# Patient Record
Sex: FEMALE | Race: WHITE | NOT HISPANIC OR LATINO | Employment: FULL TIME | URBAN - METROPOLITAN AREA
[De-identification: names, ages, dates, MRNs, and addresses within clinical notes are randomized per-mention and may not be internally consistent; named-entity substitution may affect disease eponyms.]

---

## 2017-04-25 DIAGNOSIS — H90.5 SNHL (SENSORINEURAL HEARING LOSS): Primary | ICD-10-CM

## 2017-05-04 ENCOUNTER — OFFICE VISIT (OUTPATIENT)
Dept: OPHTHALMOLOGY | Facility: CLINIC | Age: 52
End: 2017-05-04

## 2017-05-04 DIAGNOSIS — H44.23 DEGENERATIVE PROGRESSIVE HIGH MYOPIA, BILATERAL: Primary | ICD-10-CM

## 2017-05-04 DIAGNOSIS — H52.4 PRESBYOPIA: ICD-10-CM

## 2017-05-04 DIAGNOSIS — Z86.69 H/O RETINAL DETACHMENT: ICD-10-CM

## 2017-05-04 ASSESSMENT — REFRACTION_WEARINGRX
OS_CYLINDER: +0.50
OS_SPHERE: -10.00
OD_AXIS: 117
OD_CYLINDER: +1.00
OS_AXIS: 043
OD_SPHERE: -10.50

## 2017-05-04 ASSESSMENT — VISUAL ACUITY
OD_PH_CC: 20/25-3
OD_CC+: -2+1
OS_PH_CC: 20/20
OS_CC: 20/30
CORRECTION_TYPE: CONTACTS
OD_CC: 20/30
METHOD: SNELLEN - LINEAR

## 2017-05-04 ASSESSMENT — REFRACTION_MANIFEST
OD_ADD: +2.00
OS_ADD: +2.00
OS_CYLINDER: SPHERE
OD_CYLINDER: +1.25
OD_SPHERE: -12.50
OS_SPHERE: -11.00
OD_AXIS: 105

## 2017-05-04 ASSESSMENT — REFRACTION_CURRENTRX
OD_SPHERE: -10.50
OS_BASECURVE: 8.6
OS_SPHERE: -10.00
OD_DIAMETER: 14.0
OD_BASECURVE: 8.6
OS_DIAMETER: 14.0

## 2017-05-04 ASSESSMENT — KERATOMETRY
OS_K2POWER_DIOPTERS: 43.25
OS_AXISANGLE_DEGREES: 100
OD_AXISANGLE2_DEGREES: 174
OD_K1POWER_DIOPTERS: 43.00
OD_AXISANGLE_DEGREES: 84
OS_AXISANGLE2_DEGREES: 10
OS_K1POWER_DIOPTERS: 42.75
OD_K2POWER_DIOPTERS: 44.50

## 2017-05-04 ASSESSMENT — TONOMETRY
OD_IOP_MMHG: 18
OS_IOP_MMHG: 18
IOP_METHOD: APPLANATION

## 2017-05-04 ASSESSMENT — CONF VISUAL FIELD
OS_NORMAL: 1
OD_NORMAL: 1
METHOD: COUNTING FINGERS

## 2017-05-04 ASSESSMENT — EXTERNAL EXAM - LEFT EYE: OS_EXAM: NORMAL

## 2017-05-04 ASSESSMENT — EXTERNAL EXAM - RIGHT EYE: OD_EXAM: NORMAL

## 2017-05-04 ASSESSMENT — CUP TO DISC RATIO
OD_RATIO: 0.30
OS_RATIO: 0.25

## 2017-05-04 ASSESSMENT — SLIT LAMP EXAM - LIDS
COMMENTS: NORMAL
COMMENTS: NORMAL

## 2017-05-04 NOTE — MR AVS SNAPSHOT
After Visit Summary   5/4/2017    Anna Carrington    MRN: [id number]           Patient Information     Date Of Birth          1965        Visit Information        Provider Department      5/4/2017 1:00 PM Andreia Conklin OD [facility]        Today's Diagnoses     Degenerative progressive high myopia, bilateral    -  1    H/O retinal detachment        PSC (posterior subcapsular cataract), bilateral        Presbyopia           Follow-ups after your visit        Your next 10 appointments already scheduled     Jun 01, 2017  3:00 PM CDT   Walk In From ENT with Angi Hollingsworth   [facility] ([facility])    [address] [place] [id number]   [phone]            Jun 01, 2017  4:00 PM CDT   (Arrive by 3:45 PM)   New Patient Visit with Tuan Sanches MD   [facility] Nose [facility] ([facility])    [address] [place] [address]   [phone]            Jun 19, 2017  9:40 AM CDT   (Arrive by 9:10 AM)   NEW THROAT with Alisa Garsia MD   [facility] ([facility])    [address] [place] [address]   [phone]           - This is a multidisciplinary care team visit with an ENT physician and may include a speech language pathologist. - It is important to know that if you are evaluated and/or treated by both a physician and a speech pathologist during your visit, your billing will reflect the input that you receive from both providers as separate professionals. Although most insurance plans do cover these services, we encourage you to contact your insurance company prior to your visit to determine whether there are any coverage limitations that might affect you financially. - Billing/procedure codes that are frequently associated with visits to our clinic include (but  are not limited to) the ones listed below. Most patients will not need all of these items, but it may be useful to ask your insurance company's patient . 26524: Flexible fiberoptic laryngoscopy, 81611: Laryngoscopy; flexible or rigid fiberoptic, with stroboscopy, 10494: Flexible endoscopic evaluation of swallowing, 31570: Evaluation of Voice and Resonance, 04762: Speech pathology treatment for voice, speech, communication, 06101: Speech pathology treatment for swallowing/oral function for feeding - If you have any concerns or questions, or if you would prefer not to have a speech pathologist involved in your visit, please contact our Clinic Coordinator at [phone], at least 24 hours prior to your appointment.              Who to contact     Please call your clinic at [phone] to:    Ask questions about your health    Make or cancel appointments    Discuss your medicines    Learn about your test results    Speak to your doctor   If you have compliments or concerns about an experience at your clinic, or if you wish to file a complaint, please contact [facility] Patient Relations at [phone] or email us at [email or web address]         Additional Information About Your Visit        [employer] Information     [employer] gives you secure access to your electronic health record. If you see a primary care provider, you can also send messages to your care team and make appointments. If you have questions, please call your primary care clinic.  If you do not have a primary care provider, please call [phone] and they will assist you.      [employer] is an electronic gateway that provides easy, online access to your medical records. With [employer], you can request a clinic appointment, read your test results, renew a prescription or communicate with your care team.     To access your existing account, please contact your [facility]  Clinic or call [phone] for assistance.        Care EveryWhere ID     This is your Care EveryWhere ID. This could be used by other organizations to access your [facility] medical records  [id number]         Blood Pressure from Last 3 Encounters:   11/21/12 123/84   08/15/12 122/72    Weight from Last 3 Encounters:   11/21/12 70.3 kg (155 lb)   08/15/12 68.5 kg (151 lb)              We Performed the Following     HC CONTACT LENS FITTING COSMETIC LVL 1 (99723.011)     REFRACTION [02806]        Primary Care Provider Office Phone # Fax #    Jeanette Luna [phone]       [address] [address]        Thank you!     Thank you for choosing [facility] OPHTHALMOLOGY  for your care. Our goal is always to provide you with excellent care. Hearing back from our patients is one way we can continue to improve our services. Please take a few minutes to complete the written survey that you may receive in the mail after your visit with us. Thank you!             Your Updated Medication List - Protect others around you: Learn how to safely use, store and throw away your medicines at www.disposemymeds.org.          This list is accurate as of: 5/4/17  3:58 PM.  Always use your most recent med list.                   Brand Name Dispense Instructions for use    CALCIUM 500+D PO      Take 1 tablet by mouth 2 times daily.       CLARITIN 10 MG capsule   Generic drug:  loratadine      Take 10 mg by mouth daily       DAILY VITAMIN PO      Take 1 tablet by mouth daily.       fluticasone 50 MCG/ACT spray    FLONASE     Spray 2 sprays into both nostrils daily       OMEGA-3 FISH OIL PO

## 2017-05-04 NOTE — NURSING NOTE
Chief Complaints and History of Present Illnesses   Patient presents with     COMPREHENSIVE EYE EXAM     h/o RD repair RE 07/04/2008; Paving stone degeneration inferiorly LE     HPI    Affected eye(s):  Both   Symptoms:     No blurred vision   No difficulty with reading   Floaters (Comment: longstanding floaters that have remained unchanged per pt, denies curtain or veil over VA)   Flashes   No tearing   Dryness (Comment: uses Refresh AT prn for relief)         Do you have eye pain now?:  No      Comments:    Patient notes she feels she needs updated gls rx today.     Dawn Vergara May 4, 2017 1:10 PM

## 2017-05-04 NOTE — PROGRESS NOTES
A/P  1.) s/p PPV and scleral buckle repair OD, Pavingstone degeneration OU  -Retinal exam stable  -Reviewed signs/symptoms of RD and RTC stat should symptoms occur    2.) Cataracts OU  -Not visually significant, continue to monitor    3.) Degenerative Myopia OU/Presbyopia  -Regular RGP wear, current lenses several years old (last fit at [facility]?)  -Reviewed options with pt - she would like to continue in DVO RGP's with readers over  -Order pair, mail to pt. RTC 2-3 weeks for CL recheck  -Spec Rx updated    Contact Lens Billing  V-Code:  - GP Spherical  Final Contact Lens Rx      Brand Base Curve Diameter Sphere Lens   Right Wolf RGP 7.89 9.5 -9.50 Optimum Extra green dot   Left Wolf RGP 7.89 9.5 -9.50 Optimum Extra blue            # of units: 2  Price per Unit: $80    Encounter Diagnoses   Name Primary?     Degenerative progressive high myopia, bilateral Yes     H/O retinal detachment      PSC (posterior subcapsular cataract), bilateral      Presbyopia       I have confirmed the patient's CC, HPI and reviewed Past Medical History, Past Surgical History, Social History, Family History, Problem List, Medication List and agree with Tech note.     Andreia Conklin, OD FAAO

## 2017-05-09 ENCOUNTER — MEDICAL CORRESPONDENCE (OUTPATIENT)
Dept: HEALTH INFORMATION MANAGEMENT | Facility: CLINIC | Age: 52
End: 2017-05-09

## 2017-05-09 ENCOUNTER — TRANSFERRED RECORDS (OUTPATIENT)
Dept: HEALTH INFORMATION MANAGEMENT | Facility: CLINIC | Age: 52
End: 2017-05-09

## 2017-05-17 ENCOUNTER — MEDICAL CORRESPONDENCE (OUTPATIENT)
Dept: HEALTH INFORMATION MANAGEMENT | Facility: CLINIC | Age: 52
End: 2017-05-17

## 2017-05-17 ENCOUNTER — TELEPHONE (OUTPATIENT)
Dept: GASTROENTEROLOGY | Facility: OUTPATIENT CENTER | Age: 52
End: 2017-05-17

## 2017-05-17 DIAGNOSIS — Z12.11 ENCOUNTER FOR SCREENING COLONOSCOPY: Primary | ICD-10-CM

## 2017-05-17 NOTE — TELEPHONE ENCOUNTER
Patient taking any blood thinners ? no    Heart disease ? denies    Lung disease ? denies      Sleep apnea ? denies    Diabetic ? denies    Kidney disease ? denies    Dialysis ? n/a    Electronic implanted medical devices ? denies    Are you taking any narcotic pain medication ?   noWhat is your daily dosage ?    PTSD ? n/a    Prep instructions reviewed with patient ? Instructions,  policy, MAC sedation plan reviewed. Advised pt. To have someone stay with her post exam    Pharmacy : [facility]    Indication for procedure : Screening colonoscopy    Referring provider : Self

## 2017-05-22 ENCOUNTER — PRE VISIT (OUTPATIENT)
Dept: OTOLARYNGOLOGY | Facility: CLINIC | Age: 52
End: 2017-05-22

## 2017-05-22 NOTE — TELEPHONE ENCOUNTER
1.  Date/reason for appt:  6/01/17   Hearing Loss    2.  Referring provider:  Self    3.  Call to patient (Yes / No - short description):  No, established pt.    Records reviewed.  All records are in Epic

## 2017-05-24 ENCOUNTER — DOCUMENTATION ONLY (OUTPATIENT)
Dept: GASTROENTEROLOGY | Facility: OUTPATIENT CENTER | Age: 52
End: 2017-05-24

## 2017-05-25 ENCOUNTER — TRANSFERRED RECORDS (OUTPATIENT)
Dept: HEALTH INFORMATION MANAGEMENT | Facility: CLINIC | Age: 52
End: 2017-05-25

## 2017-05-25 ENCOUNTER — DOCUMENTATION ONLY (OUTPATIENT)
Dept: GASTROENTEROLOGY | Facility: OUTPATIENT CENTER | Age: 52
End: 2017-05-25

## 2017-05-31 DIAGNOSIS — H90.5 RIGHT-SIDED SENSORINEURAL HEARING LOSS: Primary | ICD-10-CM

## 2017-06-01 ENCOUNTER — OFFICE VISIT (OUTPATIENT)
Dept: AUDIOLOGY | Facility: CLINIC | Age: 52
End: 2017-06-01
Attending: OTOLARYNGOLOGY

## 2017-06-01 ENCOUNTER — OFFICE VISIT (OUTPATIENT)
Dept: OTOLARYNGOLOGY | Facility: CLINIC | Age: 52
End: 2017-06-01

## 2017-06-01 VITALS — BODY MASS INDEX: 24.18 KG/M2 | WEIGHT: 159 LBS

## 2017-06-01 DIAGNOSIS — H90.3 SNHL (SENSORY-NEURAL HEARING LOSS), ASYMMETRICAL: Primary | ICD-10-CM

## 2017-06-01 DIAGNOSIS — H90.5 SENSORINEURAL HEARING LOSS OF LEFT EAR: ICD-10-CM

## 2017-06-01 DIAGNOSIS — H90.2 CONDUCTIVE HEARING LOSS, UNILATERAL: ICD-10-CM

## 2017-06-01 DIAGNOSIS — H90.71 MIXED HEARING LOSS OF RIGHT EAR: ICD-10-CM

## 2017-06-01 ASSESSMENT — PAIN SCALES - GENERAL: PAINLEVEL: NO PAIN (0)

## 2017-06-01 NOTE — LETTER
6/1/2017       RE: Anna Carrington  [address] [address]     Dear Colleague,    Thank you for referring your patient, Anna Carrington, to the [facility] EAR NOSE AND THROAT at [facility]. Please see a copy of my visit note below.    The patient presents with a history of hearing loss worse in the right ear than in the left ear.  The patient reports a progressive hearing loss in both ears over at least the past five years.  The patient denies ear infections, otalgia, otorrhea, dizziness, or tinnitus.  She reports that she is also having difficulty with her voice and she is scheduled to visit with Dr. Alisa Garsia in two weeks. The patient denies sinusitis, rhinitis, facial pain, nasal obstruction or purulent nasal discharge. The patient denies chronic or recurrent tonsillitis, chronic or recurrent pharyngitis. The patient denies otalgia, otorrhea, eustachian tube dysfunction, ear infections, dizziness or tinnitus. Her Audiogram and Tympanogram are reviewed with her and they demonstrate bilateral sensorineural hearing loss that is much worse in the right ear than in the left ear. The right ear has an added conductive hearing loss. She has word recognition scores of 100% on the right and 96% on the left. Her tympanograms are normal. Her Audiogram and Tympanogram from 2012 is reviewed with her and this demonstrates a significant hearing loss in the right ear over this time period.     This patient is seen in consultation as a self referral to my clinic.     All other systems were reviewed and they are either negative or they are not directly pertinent to this Otolaryngology examination.      Past Medical History:    Past Medical History:   Diagnosis Date     CNS vasculitis (H)      Dysphonia 2016    Difficulty with my voice (persistent throat clearing, cough,     Hearing problem      Hoarseness 2016    difficulty singing, tiredness has been going on for  a year+.     Numbness     heel of foot     Tinnitus 5 years ago?     Weakness        Past Surgical History:    Past Surgical History:   Procedure Laterality Date     BIOPSY OF SKIN LESION  2011     retinal detachment surgery  July 4, 2008     RETINAL REATTACHMENT Right 7-4-2008     THORACOSCOPIC BIOPSY LUNG  2001       Medications:      Current Outpatient Prescriptions:      fluticasone (FLONASE) 50 MCG/ACT nasal spray, Spray 2 sprays into both nostrils daily, Disp: , Rfl:      Omega-3 Fatty Acids (OMEGA-3 FISH OIL PO), , Disp: , Rfl:      Multiple Vitamin (DAILY VITAMIN PO), Take 1 tablet by mouth daily., Disp: , Rfl:      Calcium Carbonate-Vitamin D (CALCIUM 500+D PO), Take 1 tablet by mouth 2 times daily., Disp: , Rfl:      loratadine (CLARITIN) 10 MG capsule, Take 10 mg by mouth daily, Disp: , Rfl:     Allergies:    Hay fever & [a.r.m.]; Minocycline; and Tetracycline    Physical Examination:    The patient is a well developed, well nourished female in no apparent distress.  She is normocephalic, atraumatic with pupils equally round and reactive to light.    Oral Cavity Examination:  Normal mucosa with no masses or lesions  Nasal Examination: Normal mucosa with no masses or lesions  Ear Examination: Ear canals clear, tympanic membranes and middle ear spaces normal  Neurological Examination: Facial nerve function intact and symmetric  Integumentary Examination: No lesions on the skin of the head and neck  Neck Examination: No masses or lesions, no lymphadenopathy  Endocrine Examination: Normal thyroid examination    Assessment and Plan:    The patient presents with a history of bilateral sensorineural hearing loss that is much worse in the right ear than in the left ear. The patient also has a conductive hearing loss in the right ear. She will be referred for an MRI scan of the head to assess for retrocochlear pathology and intracranial pathology and she will be referred to Dr. Rick Nissen for further evaluation  and management.       Again, thank you for allowing me to participate in the care of your patient.      Sincerely,    Tuan Sanches MD

## 2017-06-01 NOTE — MR AVS SNAPSHOT
After Visit Summary   6/1/2017    Anna Carrington    MRN: [id number]           Patient Information     Date Of Birth          1965        Visit Information        Provider Department      6/1/2017 4:00 PM Tuan Sanches MD M [facility] Ear Nose and Throat        Today's Diagnoses     SNHL (sensory-neural hearing loss), asymmetrical    -  1    Conductive hearing loss, unilateral          Care Instructions    The patient presents with a history of bilateral sensorineural hearing loss that is much worse in the right ear than in the left ear. The patient also has a conductive hearing loss in the right ear. She will be referred for an MRI scan of the head to assess for retrocochlear pathology and intracranial pathology and she will be referred to Dr. Rick Nissen for further evaluation and management.          Follow-ups after your visit        Your next 10 appointments already scheduled     Jun 19, 2017  9:40 AM CDT   (Arrive by 9:10 AM)   NEW THROAT with Alisa Garsia MD   [facility] Ear Nose and Throat ([facility])    [address] [place] [address] [phone]           - This is a multidisciplinary care team visit with an ENT physician and may include a speech language pathologist. - It is important to know that if you are evaluated and/or treated by both a physician and a speech pathologist during your visit, your billing will reflect the input that you receive from both providers as separate professionals. Although most insurance plans do cover these services, we encourage you to contact your insurance company prior to your visit to determine whether there are any coverage limitations that might affect you financially. - Billing/procedure codes that are frequently associated with visits to our clinic include (but are not limited to) the ones listed below. Most patients will not need all of these items, but it may be useful  to ask your insurance company's patient . 97279: Flexible fiberoptic laryngoscopy, 70662: Laryngoscopy; flexible or rigid fiberoptic, with stroboscopy, 96873: Flexible endoscopic evaluation of swallowing, 86339: Evaluation of Voice and Resonance, 99816: Speech pathology treatment for voice, speech, communication, 59238: Speech pathology treatment for swallowing/oral function for feeding - If you have any concerns or questions, or if you would prefer not to have a speech pathologist involved in your visit, please contact our Clinic Coordinator at [phone], at least 24 hours prior to your appointment.            Jul 18, 2017  7:45 AM CDT   (Arrive by 7:30 AM)   MR BRAIN W/O & W CONTRAST with [address] MRI ([facility])    [address] [place] [address] [phone]           Take your medicines as usual, unless your doctor tells you not to. Bring a list of your current medicines to your exam (including vitamins, minerals and over-the-counter drugs).  You will be given intravenous contrast for this exam. To prepare:   The day before your exam, drink extra fluids at least six 8-ounce glasses (unless your doctor tells you to restrict your fluids).   Have a blood test (creatinine test) within 30 days of your exam. Go to your clinic or Diagnostic Imaging Department for this test.  The MRI machine uses a strong magnet. Please wear clothes without metal (snaps, zippers). A sweatsuit works well, or we may give you a hospital gown.  Please remove any body piercings and hair extensions before you arrive. You will also remove watches, jewelry, hairpins, wallets, dentures, partial dental plates and hearing aids. You may wear contact lenses, and you may be able to wear your rings. We have a safe place to keep your personal items, but it is safer to leave them at home.   **IMPORTANT** THE INSTRUCTIONS BELOW ARE ONLY FOR THOSE  PATIENTS WHO HAVE BEEN TOLD THEY WILL RECEIVE SEDATION OR GENERAL ANESTHESIA DURING THEIR MRI PROCEDURE:  IF YOU WILL RECEIVE SEDATION (take medicine to help you relax during your exam):   You must get the medicine from your doctor before you arrive. Bring the medicine to the exam. Do not take it at home.   Arrive one hour early. Bring someone who can take you home after the test. Your medicine will make you sleepy. After the exam, you may not drive, take a bus or take a taxi by yourself.   No eating 8 hours before your exam. You may have clear liquids up until 4 hours before your exam. (Clear liquids include water, clear tea, black coffee and fruit juice without pulp.)  IF YOU WILL RECEIVE ANESTHESIA (be asleep for your exam):   Arrive 1 1/2 hours early. Bring someone who can take you home after the test. You may not drive, take a bus or take a taxi by yourself.   No eating 8 hours before your exam. You may have clear liquids up until 4 hours before your exam. (Clear liquids include water, clear tea, black coffee and fruit juice without pulp.)  Please call the Imaging Department at your exam site with any questions.            Jul 18, 2017  9:30 AM CDT   (Arrive by 9:15 AM)   NEW NEUROTOLOGY VISIT with Rick L Nissen, MD   [facility] Ear Nose [facility] Throat ([facility])    [address] [place] [address] [phone]              Future tests that were ordered for you today     Open Future Orders        Priority Expected Expires Ordered    MRI Brain and Skull Base w & w/o contrast Routine  6/1/2018 6/1/2017    Urea nitrogen Routine  6/1/2018 6/1/2017    Creatinine Routine  6/1/2018 6/1/2017            Who to contact     Please call your clinic at [phone] to:    Ask questions about your health    Make or cancel appointments    Discuss your medicines    Learn about your test results    Speak to your doctor   If you have compliments or concerns about an experience at  your clinic, or if you wish to file a complaint, please contact [facility] Patient Relations at [phone] or email us at [email or web address]         Additional Information About Your Visit        MyChart Information     [employer] gives you secure access to your electronic health record. If you see a primary care provider, you can also send messages to your care team and make appointments. If you have questions, please call your primary care clinic.  If you do not have a primary care provider, please call [phone] and they will assist you.      [employer] is an electronic gateway that provides easy, online access to your medical records. With [employer], you can request a clinic appointment, read your test results, renew a prescription or communicate with your care team.     To access your existing account, please contact your [facility] Clinic or call [phone] for assistance.        Care EveryWhere ID     This is your Care EveryWhere ID. This could be used by other organizations to access your [facility] medical records  [id number]        Your Vitals Were     BMI (Body Mass Index)                   24.18 kg/m2            Blood Pressure from Last 3 Encounters:   11/21/12 123/84   08/15/12 122/72    Weight from Last 3 Encounters:   06/01/17 72.1 kg (159 lb)   11/21/12 70.3 kg (155 lb)   08/15/12 68.5 kg (151 lb)               Primary Care Provider Office Phone # Fax #    Jeanette Luna [phone]       [address] [address]        Thank you!     Thank you for choosing [facility] EAR NOSE AND THROAT  for your care. Our goal is always to provide you with excellent care. Hearing back from our patients is one way we can continue to improve our services. Please take a few minutes to complete the written survey that you may receive in the mail after your visit with us. Thank you!             Your Updated  Medication List - Protect others around you: Learn how to safely use, store and throw away your medicines at www.disposemymeds.org.          This list is accurate as of: 6/1/17  4:20 PM.  Always use your most recent med list.                   Brand Name Dispense Instructions for use    CALCIUM 500+D PO      Take 1 tablet by mouth 2 times daily.       CLARITIN 10 MG capsule   Generic drug:  loratadine      Take 10 mg by mouth daily       DAILY VITAMIN PO      Take 1 tablet by mouth daily.       fluticasone 50 MCG/ACT spray    FLONASE     Spray 2 sprays into both nostrils daily       OMEGA-3 FISH OIL PO

## 2017-06-01 NOTE — PATIENT INSTRUCTIONS
The patient presents with a history of bilateral sensorineural hearing loss that is much worse in the right ear than in the left ear. The patient also has a conductive hearing loss in the right ear. She will be referred for an MRI scan of the head to assess for retrocochlear pathology and intracranial pathology and she will be referred to Dr. Rick Nissen for further evaluation and management.

## 2017-06-01 NOTE — PROGRESS NOTES
The patient presents with a history of hearing loss worse in the right ear than in the left ear.  The patient reports a progressive hearing loss in both ears over at least the past five years.  The patient denies ear infections, otalgia, otorrhea, dizziness, or tinnitus.  She reports that she is also having difficulty with her voice and she is scheduled to visit with Dr. Alisa Garsia in two weeks. The patient denies sinusitis, rhinitis, facial pain, nasal obstruction or purulent nasal discharge. The patient denies chronic or recurrent tonsillitis, chronic or recurrent pharyngitis. The patient denies otalgia, otorrhea, eustachian tube dysfunction, ear infections, dizziness or tinnitus. Her Audiogram and Tympanogram are reviewed with her and they demonstrate bilateral sensorineural hearing loss that is much worse in the right ear than in the left ear. The right ear has an added conductive hearing loss. She has word recognition scores of 100% on the right and 96% on the left. Her tympanograms are normal. Her Audiogram and Tympanogram from 2012 is reviewed with her and this demonstrates a significant hearing loss in the right ear over this time period.     This patient is seen in consultation as a self referral to my clinic.     All other systems were reviewed and they are either negative or they are not directly pertinent to this Otolaryngology examination.      Past Medical History:    Past Medical History:   Diagnosis Date     CNS vasculitis (H)      Dysphonia 2016    Difficulty with my voice (persistent throat clearing, cough,     Hearing problem      Hoarseness 2016    difficulty singing, tiredness has been going on for a year+.     Numbness     heel of foot     Tinnitus 5 years ago?     Weakness        Past Surgical History:    Past Surgical History:   Procedure Laterality Date     BIOPSY OF SKIN LESION  2011     retinal detachment surgery  July 4, 2008     RETINAL REATTACHMENT Right 7-4-2008     THORACOSCOPIC  BIOPSY LUNG  2001       Medications:      Current Outpatient Prescriptions:      fluticasone (FLONASE) 50 MCG/ACT nasal spray, Spray 2 sprays into both nostrils daily, Disp: , Rfl:      Omega-3 Fatty Acids (OMEGA-3 FISH OIL PO), , Disp: , Rfl:      Multiple Vitamin (DAILY VITAMIN PO), Take 1 tablet by mouth daily., Disp: , Rfl:      Calcium Carbonate-Vitamin D (CALCIUM 500+D PO), Take 1 tablet by mouth 2 times daily., Disp: , Rfl:      loratadine (CLARITIN) 10 MG capsule, Take 10 mg by mouth daily, Disp: , Rfl:     Allergies:    Hay fever & [a.r.m.]; Minocycline; and Tetracycline    Physical Examination:    The patient is a well developed, well nourished female in no apparent distress.  She is normocephalic, atraumatic with pupils equally round and reactive to light.    Oral Cavity Examination:  Normal mucosa with no masses or lesions  Nasal Examination: Normal mucosa with no masses or lesions  Ear Examination: Ear canals clear, tympanic membranes and middle ear spaces normal  Neurological Examination: Facial nerve function intact and symmetric  Integumentary Examination: No lesions on the skin of the head and neck  Neck Examination: No masses or lesions, no lymphadenopathy  Endocrine Examination: Normal thyroid examination    Assessment and Plan:    The patient presents with a history of bilateral sensorineural hearing loss that is much worse in the right ear than in the left ear. The patient also has a conductive hearing loss in the right ear. She will be referred for an MRI scan of the head to assess for retrocochlear pathology and intracranial pathology and she will be referred to Dr. Rick Nissen for further evaluation and management.

## 2017-06-01 NOTE — MR AVS SNAPSHOT
After Visit Summary   6/1/2017    Anna Carrington    MRN: [id number]           Patient Information     Date Of Birth          1965        Visit Information        Provider Department      6/1/2017 3:00 PM Caro Pineda Au[facility] Audiology        Today's Diagnoses     Mixed hearing loss of right ear        Sensorineural hearing loss of left ear           Follow-ups after your visit        Your next 10 appointments already scheduled     Jun 01, 2017  4:00 PM CDT   (Arrive by 3:45 PM)   New Patient Visit with Tuan Sanches MD   [facility] Ear Nose [facility] Throat ([facility])    [address] [place] [id number]   [phone]            Jun 19, 2017  9:40 AM CDT   (Arrive by 9:10 AM)   NEW THROAT with Alisa Garsia MD   [facility] Nose [facility] Thro[facility])    [address] [place] [id number]   [phone]           - This is a multidisciplinary care team visit with an ENT physician and may include a speech language pathologist. - It is important to know that if you are evaluated and/or treated by both a physician and a speech pathologist during your visit, your billing will reflect the input that you receive from both providers as separate professionals. Although most insurance plans do cover these services, we encourage you to contact your insurance company prior to your visit to determine whether there are any coverage limitations that might affect you financially. - Billing/procedure codes that are frequently associated with visits to our clinic include (but are not limited to) the ones listed below. Most patients will not need all of these items, but it may be useful to ask your insurance company's patient . 03912: Flexible fiberoptic laryngoscopy, 09811: Laryngoscopy; flexible or rigid fiberoptic, with stroboscopy, 04780: Flexible endoscopic  evaluation of swallowing, 18252: Evaluation of Voice and Resonance, 69331: Speech pathology treatment for voice, speech, communication, 90959: Speech pathology treatment for swallowing/oral function for feeding - If you have any concerns or questions, or if you would prefer not to have a speech pathologist involved in your visit, please contact our Clinic Coordinator at [phone], at least 24 hours prior to your appointment.              Who to contact     Please call your clinic at [phone] to:    Ask questions about your health    Make or cancel appointments    Discuss your medicines    Learn about your test results    Speak to your doctor   If you have compliments or concerns about an experience at your clinic, or if you wish to file a complaint, please contact [facility] Patient Relations at [phone] or email us at [email or web address]         Additional Information About Your Visit        [employer] Information     [employer] gives you secure access to your electronic health record. If you see a primary care provider, you can also send messages to your care team and make appointments. If you have questions, please call your primary care clinic.  If you do not have a primary care provider, please call [phone] and they will assist you.      [employer] is an electronic gateway that provides easy, online access to your medical records. With [employer], you can request a clinic appointment, read your test results, renew a prescription or communicate with your care team.     To access your existing account, please contact your [facility] or call [phone] for assistance.        Care EveryWhere ID     This is your Care EveryWhere ID. This could be used by other organizations to access your [place] medical records  [id number]         Blood Pressure from Last 3 Encounters:   11/21/12 123/84   08/15/12 122/72    Weight from Last 3  Encounters:   11/21/12 70.3 kg (155 lb)   08/15/12 68.5 kg (151 lb)              We Performed the Following     AUDIOGRAM/TYMPANOGRAM - INTERFACE     [facility] Audiometry Thrshld Eval & Speech Recog (90776)     Kanika   ([id number])     Tymps / Reflex   (15616)        Primary Care Provider Office Phone # Fax Sander Luna [phone]       [address] [address]        Thank you!     Thank you for choosing [facility] AUDIOLOGY  for your care. Our goal is always to provide you with excellent care. Hearing back from our patients is one way we can continue to improve our services. Please take a few minutes to complete the written survey that you may receive in the mail after your visit with us. Thank you!             Your Updated Medication List - Protect others around you: Learn how to safely use, store and throw away your medicines at www.disposemymeds.org.          This list is accurate as of: 6/1/17  3:46 PM.  Always use your most recent med list.                   Brand Name Dispense Instructions for use    CALCIUM 500+D PO      Take 1 tablet by mouth 2 times daily.       CLARITIN 10 MG capsule   Generic drug:  loratadine      Take 10 mg by mouth daily       DAILY VITAMIN PO      Take 1 tablet by mouth daily.       fluticasone 50 MCG/ACT spray    FLONASE     Spray 2 sprays into both nostrils daily       OMEGA-3 FISH OIL PO

## 2017-06-01 NOTE — PROGRESS NOTES
AUDIOLOGY REPORT    SUMMARY: Audiology visit completed. See audiogram for results.      RECOMMENDATIONS: Follow-up with ENT.      Gumaro Mosquera, CCC-A  Licensed Audiologist  [place] #[id number]

## 2017-06-01 NOTE — NURSING NOTE
Chief Complaint   Patient presents with     Consult     New Right sided hearing loss     Pt states no pain today.    ERIC Curtis LPN

## 2017-06-09 ENCOUNTER — PRE VISIT (OUTPATIENT)
Dept: OTOLARYNGOLOGY | Facility: CLINIC | Age: 52
End: 2017-06-09

## 2017-07-07 ENCOUNTER — OFFICE VISIT (OUTPATIENT)
Dept: OTOLARYNGOLOGY | Facility: CLINIC | Age: 52
End: 2017-07-07

## 2017-07-07 VITALS — WEIGHT: 158 LBS | HEIGHT: 68 IN | BODY MASS INDEX: 23.95 KG/M2

## 2017-07-07 DIAGNOSIS — R13.12 OROPHARYNGEAL DYSPHAGIA: ICD-10-CM

## 2017-07-07 DIAGNOSIS — R49.0 DYSPHONIA: Primary | ICD-10-CM

## 2017-07-07 DIAGNOSIS — R09.89 CHRONIC THROAT CLEARING: ICD-10-CM

## 2017-07-07 DIAGNOSIS — J38.7 IRRITABLE LARYNX: ICD-10-CM

## 2017-07-07 ASSESSMENT — PAIN SCALES - GENERAL: PAINLEVEL: NO PAIN (0)

## 2017-07-07 NOTE — NURSING NOTE
Procedure: Upper aerodigestive tract endoscopy, Flexible or rigid laryngoscopy, possible stroboscopy, possible flexible endoscopic evaluation of swallowing   Reason: symptoms requiring examination   PREPROCEDURE:   Yes Patient ID verified with 2 identifiers (name and  or MRN)   Yes: Procedure and site verified with patient/designee (when able)   Yes: Accurate consent documentation in medical record   No: Marking not required. Reason: [ Procedure does not require site marking. ][ Provider is in continuous attendance with the patient from consent through completion of procedure. ][ Marking unable or refused by patient (see scanned diagram).   TIME OUT:   Yes: Time-Out performed immediately prior to starting procedure, including verbal and active participation of all team members addressing:   * Correct patient identity   * Confirmed that the correct side and site are marked   * An accurate procedure consent form   * Agreement on the procedure to be done   * Correct patient position   * Relevant images and results are properly labeled and appropriately displayed   * The need to administer antibiotics or fluids for irrigation purposes during the procedure as applicable   * Safety precations based on patient history or medication use   DURING PROCEDURE: Verification of correct person, site, and procedure occurs any time the responsibility for care of the patient is transferred to another member of the care team.   Lotus Wilson RN  P Otolaryngology/Head & Neck Surgery

## 2017-07-07 NOTE — PROGRESS NOTES
Dear Colleague:    I had the pleasure of meeting Anna Carrington in consultation at the [facility] the [facility] on a self-referred basis, for evaluation of dysphonia, dysphagia, chronic cough/throat-clearing. The note from our visit follows.  I appreciate the opportunity to participate in the care of this pleasant patient.    Please feel free to contact me with any questions.    Sincerely yours,    Alisa Garsia M.D., M.P.H.  , Laryngology  Director, [facility]  Otolaryngology- Head & Neck Surgery  [phone]          =====    History of Present Illness:   Anna Carrington is a pleasant 51-year-old female, known to us through her work with the [employer], who presents with a two year history of throat concerns. Symptoms include increased effort to talk/sing, throat irritation, mucus in throat, frequent throat-clearing, frequent cough, poor voice quality, voice tires easily, change in vocal volume, change in range and raspy voice.      Voice  She reports a two year history of vocal problems, following a series of sinus infections and colds, as well as with menopause. The problem began gradually and is worse after heavy voice use. It is worsening over time. Typical voice use is routine. 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  She recalls having some voice therapy in 2012 with Aminata Naidu, but does not recall specifically why. Notes from that period mention vocal weakness. I was unable to find documentation of stroboscopy, but Dr. Echeverria performed laryngoscopy that was notable only for some mild erythema of the postcricoid area. She feels like her voice has never really improved since that time and is currently really wishing it could work better    She is eager to be able to sing again as she really enjoys it and finds fellowship in it. She notes that her upper notes are most  affected. Her lower range seems to have increased with age.      Swallowing  She has had the sensation of a build up of phlegm after meals for the same period of time. Things get down the wrong tube monthly. At times her swallow feels fine and she has not had any chest infections in the past six months. She does experience increased swallowing effort with all textures.      Cough/Throat-clearing  She had a cough all winter. She has had throat-clearing for a couple of years now. Both are dry. She attributes this to the sinus infections/colds. The problem came on gradually and is worse with stress, at meals, and is intermittent. Triggers include cold air, drinking, and trying not to cough. Flonase had been tried and seemed to have helped a bit with the cough.      Breathing  No concerns.       Throat discomfort  As above.      Reflux-type symptoms  She experiences heartburn/indigestion rarely. She is not taking reflux medications.    Prior EPIC records were reviewed for this visit. She has seen Dr. Sanches for hearing loss. She does have a little hay fever.      MEDICATIONS:     Current Outpatient Prescriptions   Medication Sig Dispense Refill     Omega-3 Fatty Acids (OMEGA-3 FISH OIL PO)        Multiple Vitamin (DAILY VITAMIN PO) Take 1 tablet by mouth daily.       Calcium Carbonate-Vitamin D (CALCIUM 500+D PO) Take 1 tablet by mouth 2 times daily.       loratadine (CLARITIN) 10 MG capsule Take 10 mg by mouth daily       fluticasone (FLONASE) 50 MCG/ACT nasal spray Spray 2 sprays into both nostrils daily         ALLERGIES:    Allergies   Allergen Reactions     Hay Fever & [A.R.M.] Itching     Minocycline Other (See Comments)     Vasculitis in the brain.     Tetracycline Other (See Comments)     Lesions on the brain, right sided paralysis        PAST MEDICAL HISTORY:   Past Medical History:   Diagnosis Date     CNS vasculitis (H)      Dysphonia 2016    Difficulty with my voice (persistent throat clearing, cough,      Hearing problem      Hoarseness 2016    difficulty singing, tiredness has been going on for a year+.     Numbness     heel of foot     Tinnitus 5 years ago?     Weakness         PAST SURGICAL HISTORY:   Past Surgical History:   Procedure Laterality Date     BIOPSY OF SKIN LESION  2011     retinal detachment surgery  July 4, 2008     RETINAL REATTACHMENT Right 7-4-2008     THORACOSCOPIC BIOPSY LUNG  2001       HABITS/SOCIAL HISTORY:    Social History   Substance Use Topics     Smoking status: Never Smoker     Smokeless tobacco: Not on file     Alcohol use Yes      Comment: 2 glasses of wine or beer 2 times per week       FAMILY HISTORY:    Family History   Problem Relation Age of Onset     Neurologic Disorder Sister      MS     Neurologic Disorder Father      MS     Retinal detachment Father      CANCER Father      colon     CANCER Mother      breast     Macular Degeneration Maternal Grandfather      Glaucoma No family hx of        REVIEW OF SYSTEMS:  The patient completed a comprehensive 11 point review of systems (below), which was reviewed. Positives are as noted below; pertinent findings are as noted in the HPI.     Patient Supplied Answers to Review of Systems   ENT ROS 7/6/2017   Ears, Nose, Throat Ringing/noise in ears, Hoarseness   Cardiopulmonary Cough   Allergy/Immunology -   Skin -       PHYSICAL EXAMINATION:  General: The patient was alert and conversant, and in no acute distress.    Eyes: PERRL, conjunctiva and lids normal, sclera nonicteric.  Nose: Anterior rhinoscopy: no gross abnormalities. no  bleeding; no  mucopurulence; septum grossly normal, mild mucoid drainage and/or crusting.  Oral cavity/oropharynx: No masses or lesions. Dentition in fair condition. Floor of mouth and oral tongue soft to palpation. Tongue mobility and palate elevation intact and symmetric.  Ears: Normal auricles, external auditory canals bilaterally. Visualized portions of tympanic membranes normal bilaterally.   Neck: No  palpable cervical lymphadenopathy. There was no significant tenderness to palpation of the thyrohyoid space, which was not narrow. No obvious thyroid abnormality. Landmarks palpable.  Resp: Breathing comfortably, no stridor or stertor.  Neuro: Symmetric facial function. Other cranial nerves as documented above.  Psych: Normal affect, pleasant and cooperative.  Voice/speech: Mild dysphonia characterized by breathiness, roughness and strain. Poor respiratory support/coordination.  Extremities: No cyanosis, clubbing, or edema of the upper extremities.       Intake scores  Total Score for Last Patient-Answered VHI Questionnaire  VHI Total Score 7/6/2017   VHI Total Score 14     Total Score for Last Patient-Answered EAT Questionnaire  EAT Total Score 7/6/2017   EAT Total Score 8     Total Score for Last Patient-Answered CSI Questionnaire  CSI Total Score 7/6/2017   CSI Total Score 14       PROCEDURE:   Flexible fiberoptic laryngoscopy and laryngovideostroboscopy  Indications: This procedure was warranted to evaluate the patient's laryngeal function. Risks, benefits, and alternatives were discussed.  Description: After written informed consent was obtained, a time-out was performed to confirm patient identity, procedure, and procedure site. Topical 3% lidocaine with 0.25% phenylephrine was applied to the nasal cavities. I performed the endoscopy and no complications were apparent. Continuous and stroboscopic light were utilized to assess routine phonation and variable frequency phonation.  Performed by: Alisa Garsia MD MPH  SLP: Petrona Hoffman MM, MA, CCC-SLP  Findings: Normal nasopharynx. Normal base of tongue, valleculae, and epiglottis. Vocal fold mobility: right: normal; left: normal. Medial edges of the vocal folds: smooth and straight. No focal mucosal lesions were observed on the true vocal folds. Glissade produced appropriate elongation. There was moderate supraglottic recruitment with connected speech. Mucosa  of false vocal folds, aryepiglottic folds, piriform sinuses, and posterior glottis unremarkable. Airway was patent. Response to the therapy probes was good.      The addition of stroboscopy allowed evaluation of the mucosal wave.   Amplitude: right: normal; left: normal. Symmetry: intermittent symmetry. Closure pattern: complete, but sometimes with some under closure, which improved with instruction. Closure plane: at glottic level. Phase distribution: slightly open-phase predominant, also improved with some instruction.      IMPRESSION AND PLAN:   Anna Carrington presents with irritable larynx syndrome and associated dysphonia and dysphagia.     I recommended speech therapy as initial primary management, with goals including reducing laryngeal irritability, improving laryngeal efficiency and improving respiratory/phonatory coordination.  If the dysphagia and cough persist despite therapy, we can consider further workup including imaging and/or additional referrals. I asked her to keep track of the relationship of her symptoms to meals as she goes through speech therapy; if there remains a tight correlation to mealtime, we may consider a Gastroenterology referral as well.     She will return as needed. I appreciate the opportunity to participate in the care of this very pleasant patient.

## 2017-07-07 NOTE — MR AVS SNAPSHOT
After Visit Summary   7/7/2017    Anna Carrington    MRN: [id number]           Patient Information     Date Of Birth          1965        Visit Information        Provider Department      7/7/2017 9:10 AM Petrona Hoffman, SLP M [facility] Voice        Today's Diagnoses     Dysphonia    -  1    Chronic throat clearing           Follow-ups after your visit        Your next 10 appointments already scheduled     Jul 18, 2017  7:45 AM CDT   (Arrive by 7:30 AM)   MR BRAIN W/O & W CONTRAST with [address] [facility] [place] [facility] ([facility] [place])    [address] [place] [address] [phone]           Take your medicines as usual, unless your doctor tells you not to. Bring a list of your current medicines to your exam (including vitamins, minerals and over-the-counter drugs).  You will be given intravenous contrast for this exam. To prepare:   The day before your exam, drink extra fluids at least six 8-ounce glasses (unless your doctor tells you to restrict your fluids).   Have a blood test (creatinine test) within 30 days of your exam. Go to your clinic or Diagnostic Imaging Department for this test.  The MRI machine uses a strong magnet. Please wear clothes without metal (snaps, zippers). A sweatsuit works well, or we may give you a hospital gown.  Please remove any body piercings and hair extensions before you arrive. You will also remove watches, jewelry, hairpins, wallets, dentures, partial dental plates and hearing aids. You may wear contact lenses, and you may be able to wear your rings. We have a safe place to keep your personal items, but it is safer to leave them at home.   **IMPORTANT** THE INSTRUCTIONS BELOW ARE ONLY FOR THOSE PATIENTS WHO HAVE BEEN TOLD THEY WILL RECEIVE SEDATION OR GENERAL ANESTHESIA DURING THEIR MRI PROCEDURE:  IF YOU WILL RECEIVE SEDATION (take medicine to help you relax during your exam):   You must get the medicine  from your doctor before you arrive. Bring the medicine to the exam. Do not take it at home.   Arrive one hour early. Bring someone who can take you home after the test. Your medicine will make you sleepy. After the exam, you may not drive, take a bus or take a taxi by yourself.   No eating 8 hours before your exam. You may have clear liquids up until 4 hours before your exam. (Clear liquids include water, clear tea, black coffee and fruit juice without pulp.)  IF YOU WILL RECEIVE ANESTHESIA (be asleep for your exam):   Arrive 1 1/2 hours early. Bring someone who can take you home after the test. You may not drive, take a bus or take a taxi by yourself.   No eating 8 hours before your exam. You may have clear liquids up until 4 hours before your exam. (Clear liquids include water, clear tea, black coffee and fruit juice without pulp.)  Please call the Imaging Department at your exam site with any questions.            Jul 18, 2017  9:30 AM CDT   (Arrive by 9:15 AM)   NEW NEUROTOLOGY VISIT with Rick L Nissen, MD   [facility] Ear Nose and Throat ([facility])    [address] [place] [address] [phone]              Who to contact     Please call your clinic at [phone] to:    Ask questions about your health    Make or cancel appointments    Discuss your medicines    Learn about your test results    Speak to your doctor   If you have compliments or concerns about an experience at your clinic, or if you wish to file a complaint, please contact [facility] Patient Relations at [phone] or email us at [email or web address]         Additional Information About Your Visit        MyChart Information     [employer] gives you secure access to your electronic health record. If you see a primary care provider, you can also send messages to your care team and make appointments. If you have questions, please call your primary care  clinic.  If you do not have a primary care provider, please call [phone] and they will assist you.      [employer] is an electronic gateway that provides easy, online access to your medical records. With [employer], you can request a clinic appointment, read your test results, renew a prescription or communicate with your care team.     To access your existing account, please contact your [facility] or call [phone] for assistance.        Care EveryWhere ID     This is your Care EveryWhere ID. This could be used by other organizations to access your [place] medical records  [id number]         Blood Pressure from Last 3 Encounters:   11/21/12 123/84   08/15/12 122/72    Weight from Last 3 Encounters:   07/07/17 71.7 kg (158 lb)   06/01/17 72.1 kg (159 lb)   11/21/12 70.3 kg (155 lb)              We Performed the Following     SPEECH/HEARING THERAPY, INDIVIDUAL        Primary Care Provider Office Phone # Fax #    Jeanette Cheryl [phone]       [place] [address]        Equal Access to Services     [facility] : Hadii cassia ku hadasho Sospenser, waaxda luqadaha, qaybta kaalmada eleonora, rahel brennan. So [facility] [phone].    ATENCIÓN: Si habla español, tiene a loyola disposición servicios gratuitos de asistencia lingüística. Sohail al [phone].    We comply with applicable federal civil rights laws and [place] laws. We do not discriminate on the basis of race, color, national origin, age, disability sex, sexual orientation or gender identity.            Thank you!     Thank you for choosing  [facility]  for your care. Our goal is always to provide you with excellent care. Hearing back from our patients is one way we can continue to improve our services. Please take a few minutes to complete the written survey that you may receive in the mail after your visit with us. Thank you!              Your Updated Medication List - Protect others around you: Learn how to safely use, store and throw away your medicines at www.disposemymeds.org.          This list is accurate as of: 7/7/17 11:59 PM.  Always use your most recent med list.                   Brand Name Dispense Instructions for use Diagnosis    CALCIUM 500+D PO      Take 1 tablet by mouth 2 times daily.        CLARITIN 10 MG capsule   Generic drug:  loratadine      Take 10 mg by mouth daily        DAILY VITAMIN PO      Take 1 tablet by mouth daily.        fluticasone 50 MCG/ACT spray    FLONASE     Spray 2 sprays into both nostrils daily        OMEGA-3 FISH OIL PO

## 2017-07-07 NOTE — PATIENT INSTRUCTIONS
1.  You were seen in the ENT Clinic today by Dr. Garsia.  If you have any questions or concerns after your appointment, please call [phone].  Press option #1 for scheduling related needs.  Press option #3 for Nurse advice.  2.  Please initiate speech therapy at the [facility] - [facility].     Lotus HAAS, RN  [facility] ENT   Head & Neck Surgery

## 2017-07-07 NOTE — NURSING NOTE
Chief Complaint   Patient presents with     Consult     New Voice - Dysphonia      Pt states no pain today.    N Ever BERNARD

## 2017-07-07 NOTE — MR AVS SNAPSHOT
After Visit Summary   7/7/2017    Anna Carrington    MRN: [id number]           Patient Information     Date Of Birth          1965        Visit Information        Provider Department      7/7/2017 9:10 AM Alisa Garsia MD [facility] Ear Nose and Throat        Today's Diagnoses     Dysphonia    -  1    Oropharyngeal dysphagia        Irritable larynx          Care Instructions    1.  You were seen in the ENT Clinic today by Dr. Garsia.  If you have any questions or concerns after your appointment, please call [phone].  Press option #1 for scheduling related needs.  Press option #3 for Nurse advice.  2.  Please initiate speech therapy at the [facility].     Lotus HAAS, RN  [facility] ENT   Head & Neck Surgery               Follow-ups after your visit        Additional Services     OTOLARYNGOLOGY REFERRAL       SPEECH-LANGUAGE PATHOLOGY SERVICE(S) REQUESTED:  Evaluate and treat    Kimberly Laguna, Ph.D., CCC/SLP  Speech-Language Pathologist  Director, [facility]  [phone]    Petrona Hoffman M.M., M.A., CCC/SLP  Speech-Language Pathologist  [facility]  [phone]                  Your next 10 appointments already scheduled     Jul 18, 2017  7:45 AM CDT   (Arrive by 7:30 AM)   MR BRAIN W/O & W CONTRAST with [address] [facility] ([facility])    [address] [place] [address] [phone]           Take your medicines as usual, unless your doctor tells you not to. Bring a list of your current medicines to your exam (including vitamins, minerals and over-the-counter drugs).  You will be given intravenous contrast for this exam. To prepare:   The day before your exam, drink extra fluids at least six 8-ounce glasses (unless your doctor tells you to restrict your fluids).   Have a blood test (creatinine test) within 30 days of your exam. Go to  your clinic or Diagnostic Imaging Department for this test.  The MRI machine uses a strong magnet. Please wear clothes without metal (snaps, zippers). A sweatsuit works well, or we may give you a hospital gown.  Please remove any body piercings and hair extensions before you arrive. You will also remove watches, jewelry, hairpins, wallets, dentures, partial dental plates and hearing aids. You may wear contact lenses, and you may be able to wear your rings. We have a safe place to keep your personal items, but it is safer to leave them at home.   **IMPORTANT** THE INSTRUCTIONS BELOW ARE ONLY FOR THOSE PATIENTS WHO HAVE BEEN TOLD THEY WILL RECEIVE SEDATION OR GENERAL ANESTHESIA DURING THEIR MRI PROCEDURE:  IF YOU WILL RECEIVE SEDATION (take medicine to help you relax during your exam):   You must get the medicine from your doctor before you arrive. Bring the medicine to the exam. Do not take it at home.   Arrive one hour early. Bring someone who can take you home after the test. Your medicine will make you sleepy. After the exam, you may not drive, take a bus or take a taxi by yourself.   No eating 8 hours before your exam. You may have clear liquids up until 4 hours before your exam. (Clear liquids include water, clear tea, black coffee and fruit juice without pulp.)  IF YOU WILL RECEIVE ANESTHESIA (be asleep for your exam):   Arrive 1 1/2 hours early. Bring someone who can take you home after the test. You may not drive, take a bus or take a taxi by yourself.   No eating 8 hours before your exam. You may have clear liquids up until 4 hours before your exam. (Clear liquids include water, clear tea, black coffee and fruit juice without pulp.)  Please call the Imaging Department at your exam site with any questions.            Jul 18, 2017  9:30 AM CDT   (Arrive by 9:15 AM)   NEW NEUROTOLOGY VISIT with Rick L Nissen, MD   [facility] Ear Nose and Throat ([facility])    [address] [place] [address]  "[facility] [address] [phone]              Who to contact     Please call your clinic at [phone] to:    Ask questions about your health    Make or cancel appointments    Discuss your medicines    Learn about your test results    Speak to your doctor   If you have compliments or concerns about an experience at your clinic, or if you wish to file a complaint, please contact [facility] Patient Relations at [phone] or email us at [email or web address]         Additional Information About Your Visit        [employer] Information     [employer] gives you secure access to your electronic health record. If you see a primary care provider, you can also send messages to your care team and make appointments. If you have questions, please call your primary care clinic.  If you do not have a primary care provider, please call [phone] and they will assist you.      [employer] is an electronic gateway that provides easy, online access to your medical records. With [employer], you can request a clinic appointment, read your test results, renew a prescription or communicate with your care team.     To access your existing account, please contact your [facility] or call [phone] for assistance.        Care EveryWhere ID     This is your Care EveryWhere ID. This could be used by other organizations to access your [place] medical records  [id number]        Your Vitals Were     Height BMI (Body Mass Index)                1.727 m (5' 8\") 24.02 kg/m2           Blood Pressure from Last 3 Encounters:   11/21/12 123/84   08/15/12 122/72    Weight from Last 3 Encounters:   07/07/17 71.7 kg (158 lb)   06/01/17 72.1 kg (159 lb)   11/21/12 70.3 kg (155 lb)              We Performed the Following     IMAGESTREAM RECORDING ORDER     LARYNGOSCOPY FLEX/RIGID W STROBOSCOPY     OTOLARYNGOLOGY REFERRAL        Primary Care Provider Office Phone # Fax #    " Jeanette Cheryl [phone]       [address] [address]        Equal Access to Services     BRADY SALDAÑA : Hadii aad ku hadducroxnan [place], alannaeliot andersonocokieha, jerardoamara springeryumikoda eleonora, rahel cyin hayaakavya thomsonmichelle cruz grabiel brennan. So [facility] [phone].    ATENCIÓN: Si habla español, tiene a loyola disposición servicios gratuitos de asistencia lingüística. Llame al [phone].    We comply with applicable federal civil rights laws and [place] laws. We do not discriminate on the basis of race, color, national origin, age, disability sex, sexual orientation or gender identity.            Thank you!     Thank you for choosing [facility]  for your care. Our goal is always to provide you with excellent care. Hearing back from our patients is one way we can continue to improve our services. Please take a few minutes to complete the written survey that you may receive in the mail after your visit with us. Thank you!             Your Updated Medication List - Protect others around you: Learn how to safely use, store and throw away your medicines at www.disposemymeds.org.          This list is accurate as of: 7/7/17 11:59 PM.  Always use your most recent med list.                   Brand Name Dispense Instructions for use Diagnosis    CALCIUM 500+D PO      Take 1 tablet by mouth 2 times daily.        CLARITIN 10 MG capsule   Generic drug:  loratadine      Take 10 mg by mouth daily        DAILY VITAMIN PO      Take 1 tablet by mouth daily.        fluticasone 50 MCG/ACT spray    FLONASE     Spray 2 sprays into both nostrils daily        OMEGA-3 FISH OIL PO

## 2017-07-07 NOTE — LETTER
7/7/2017      RE: Anna Carrington  [address] [facility] [address]       Dear Colleague:    I had the pleasure of meeting Anna Carrington in consultation at the [facility] of the [facility] on a self-referred basis, for evaluation of dysphonia, dysphagia, chronic cough/throat-clearing. The note from our visit follows.  I appreciate the opportunity to participate in the care of this pleasant patient.    Please feel free to contact me with any questions.    Sincerely yours,    Alisa Garsia M.D., M.P.H.  , Laryngology  Director, [facility]  Otolaryngology- Head & Neck Surgery  [phone]          =====    History of Present Illness:   Anna Carrington is a pleasant 51-year-old female, known to us through her work with the [employer], who presents with a two year history of throat concerns. Symptoms include increased effort to talk/sing, throat irritation, mucus in throat, frequent throat-clearing, frequent cough, poor voice quality, voice tires easily, change in vocal volume, change in range and raspy voice.      Voice  She reports a two year history of vocal problems, following a series of sinus infections and colds, as well as with menopause. The problem began gradually and is worse after heavy voice use. It is worsening over time. Typical voice use is routine. 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  She recalls having some voice therapy in 2012 with Aminata Naidu, but does not recall specifically why. Notes from that period mention vocal weakness. I was unable to find documentation of stroboscopy, but Dr. Echeverria performed laryngoscopy that was notable only for some mild erythema of the postcricoid area. She feels like her voice has never really improved since that time and is currently really wishing it could work better    She is eager to be able to sing again as she  really enjoys it and finds fellowship in it. She notes that her upper notes are most affected. Her lower range seems to have increased with age.      Swallowing  She has had the sensation of a build up of phlegm after meals for the same period of time. Things get down the wrong tube monthly. At times her swallow feels fine and she has not had any chest infections in the past six months. She does experience increased swallowing effort with all textures.      Cough/Throat-clearing  She had a cough all winter. She has had throat-clearing for a couple of years now. Both are dry. She attributes this to the sinus infections/colds. The problem came on gradually and is worse with stress, at meals, and is intermittent. Triggers include cold air, drinking, and trying not to cough. Flonase had been tried and seemed to have helped a bit with the cough.      Breathing  No concerns.       Throat discomfort  As above.      Reflux-type symptoms  She experiences heartburn/indigestion rarely. She is not taking reflux medications.    Prior EPIC records were reviewed for this visit. She has seen Dr. Sanches for hearing loss. She does have a little hay fever.      MEDICATIONS:     Current Outpatient Prescriptions   Medication Sig Dispense Refill     Omega-3 Fatty Acids (OMEGA-3 FISH OIL PO)        Multiple Vitamin (DAILY VITAMIN PO) Take 1 tablet by mouth daily.       Calcium Carbonate-Vitamin D (CALCIUM 500+D PO) Take 1 tablet by mouth 2 times daily.       loratadine (CLARITIN) 10 MG capsule Take 10 mg by mouth daily       fluticasone (FLONASE) 50 MCG/ACT nasal spray Spray 2 sprays into both nostrils daily         ALLERGIES:    Allergies   Allergen Reactions     Hay Fever & [A.R.M.] Itching     Minocycline Other (See Comments)     Vasculitis in the brain.     Tetracycline Other (See Comments)     Lesions on the brain, right sided paralysis        PAST MEDICAL HISTORY:   Past Medical History:   Diagnosis Date     CNS vasculitis (H)       Dysphonia 2016    Difficulty with my voice (persistent throat clearing, cough,     Hearing problem      Hoarseness 2016    difficulty singing, tiredness has been going on for a year+.     Numbness     heel of foot     Tinnitus 5 years ago?     Weakness         PAST SURGICAL HISTORY:   Past Surgical History:   Procedure Laterality Date     BIOPSY OF SKIN LESION  2011     retinal detachment surgery  July 4, 2008     RETINAL REATTACHMENT Right 7-4-2008     THORACOSCOPIC BIOPSY LUNG  2001       HABITS/SOCIAL HISTORY:    Social History   Substance Use Topics     Smoking status: Never Smoker     Smokeless tobacco: Not on file     Alcohol use Yes      Comment: 2 glasses of wine or beer 2 times per week       FAMILY HISTORY:    Family History   Problem Relation Age of Onset     Neurologic Disorder Sister      MS     Neurologic Disorder Father      MS     Retinal detachment Father      CANCER Father      colon     CANCER Mother      breast     Macular Degeneration Maternal Grandfather      Glaucoma No family hx of        REVIEW OF SYSTEMS:  The patient completed a comprehensive 11 point review of systems (below), which was reviewed. Positives are as noted below; pertinent findings are as noted in the HPI.     Patient Supplied Answers to Review of Systems   ENT ROS 7/6/2017   Ears, Nose, Throat Ringing/noise in ears, Hoarseness   Cardiopulmonary Cough   Allergy/Immunology -   Skin -       PHYSICAL EXAMINATION:  General: The patient was alert and conversant, and in no acute distress.    Eyes: PERRL, conjunctiva and lids normal, sclera nonicteric.  Nose: Anterior rhinoscopy: no gross abnormalities. no  bleeding; no  mucopurulence; septum grossly normal, mild mucoid drainage and/or crusting.  Oral cavity/oropharynx: No masses or lesions. Dentition in fair condition. Floor of mouth and oral tongue soft to palpation. Tongue mobility and palate elevation intact and symmetric.  Ears: Normal auricles, external auditory canals  bilaterally. Visualized portions of tympanic membranes normal bilaterally.   Neck: No palpable cervical lymphadenopathy. There was no significant tenderness to palpation of the thyrohyoid space, which was not narrow. No obvious thyroid abnormality. Landmarks palpable.  Resp: Breathing comfortably, no stridor or stertor.  Neuro: Symmetric facial function. Other cranial nerves as documented above.  Psych: Normal affect, pleasant and cooperative.  Voice/speech: Mild dysphonia characterized by breathiness, roughness and strain. Poor respiratory support/coordination.  Extremities: No cyanosis, clubbing, or edema of the upper extremities.       Intake scores  Total Score for Last Patient-Answered VHI Questionnaire  VHI Total Score 7/6/2017   VHI Total Score 14     Total Score for Last Patient-Answered EAT Questionnaire  EAT Total Score 7/6/2017   EAT Total Score 8     Total Score for Last Patient-Answered CSI Questionnaire  CSI Total Score 7/6/2017   CSI Total Score 14       PROCEDURE:   Flexible fiberoptic laryngoscopy and laryngovideostroboscopy  Indications: This procedure was warranted to evaluate the patient's laryngeal function. Risks, benefits, and alternatives were discussed.  Description: After written informed consent was obtained, a time-out was performed to confirm patient identity, procedure, and procedure site. Topical 3% lidocaine with 0.25% phenylephrine was applied to the nasal cavities. I performed the endoscopy and no complications were apparent. Continuous and stroboscopic light were utilized to assess routine phonation and variable frequency phonation.  Performed by: Alisa Garsia MD MPH  SLP: Petrona Hoffman MM, MA, CCC-SLP  Findings: Normal nasopharynx. Normal base of tongue, valleculae, and epiglottis. Vocal fold mobility: right: normal; left: normal. Medial edges of the vocal folds: smooth and straight. No focal mucosal lesions were observed on the true vocal folds. Glissade produced appropriate  elongation. There was moderate supraglottic recruitment with connected speech. Mucosa of false vocal folds, aryepiglottic folds, piriform sinuses, and posterior glottis unremarkable. Airway was patent. Response to the therapy probes was good.      The addition of stroboscopy allowed evaluation of the mucosal wave.   Amplitude: right: normal; left: normal. Symmetry: intermittent symmetry. Closure pattern: complete, but sometimes with some under closure, which improved with instruction. Closure plane: at glottic level. Phase distribution: slightly open-phase predominant, also improved with some instruction.      IMPRESSION AND PLAN:   Anna Carrington presents with irritable larynx syndrome and associated dysphonia and dysphagia.     I recommended speech therapy as initial primary management, with goals including reducing laryngeal irritability, improving laryngeal efficiency and improving respiratory/phonatory coordination.  If the dysphagia and cough persist despite therapy, we can consider further workup including imaging and/or additional referrals. I asked her to keep track of the relationship of her symptoms to meals as she goes through speech therapy; if there remains a tight correlation to mealtime, we may consider a Gastroenterology referral as well.     She will return as needed. I appreciate the opportunity to participate in the care of this very pleasant patient.            Alisa Garsia MD

## 2017-07-07 NOTE — LETTER
7/7/2017       RE: Anna Carrington  [address] [facility] [address]     Dear Colleague,    Thank you for referring your patient, Anna Carrington, to the [facility]. Please see a copy of my visit note below.    [facility]  Shreyas Holder Jr., M.D., F.A.C.S.  Alisa Garsia M.D., M.P.H.  Kimberly Laguna, Ph.D., CCC/SLP  Petrona Hoffman M.M. (voice), MVALENTINA., CCC/SLP  Ady Cedeno M.M. (voice), M.A., [facility]SLP    Patient: Anna Carrington  Date of Visit: 7/7/2017    CHIEF COMPLAINT: Dysphonia    HISTORY  Anna Carrington was seen for initial voice evaluation and laryngeal examination in conjunction with a visit to Dr. Garsia.  Please refer to Dr. Garsia s dictation for additional history and impressions. Salient details of her history are as follows:    Ms. Carrington is a 51 year old female with a history of dysphonia.    Symptoms began several years ago and have gradually progressed.    CURRENT SYMPTOMS INCLUDE  VOICE    Had therapy back in 2012 somewhere; gradually worsening, but not sure if it continues to progress.    Trouble singing; enjoys avocational singing.    Lower is ok, but upper range is limited even C6    Speaking voice is also affected - little raspier, can get volume, but reduced; quicker to fatigue  COUGH/THROAT CLEARING    Long Hx of cough and throat clearing; not sure if it is really productive, more habitual.    Singing at a [employer] party was difficult.     her cough/ throat clearing triggers include:  o Worse after eating  o Talking (also fatigues)  SWALLOWING/THROAT SYMPTOMS    More effortful to swallow; occasionally things go down the wrong way. Maybe more liquids.    Now more aware, where she used to not think about it.   BREATHING    Denies any issue  ADDITIONAL    Had voice lessons in college.    OTHER PERTINENT HISTORY    Please also refer to Dr. Garsia s dictation for additional history and  "impressions.    Flonase started 1-2 mo ago with PCP; and chest scan was clear.  Just ran out of the flonase, and     Past Medical History:   Diagnosis Date     CNS vasculitis (H)      Dysphonia 2016    Difficulty with my voice (persistent throat clearing, cough,     Hearing problem      Hoarseness 2016    difficulty singing, tiredness has been going on for a year+.     Numbness     heel of foot     Tinnitus 5 years ago?     Weakness      Past Surgical History:   Procedure Laterality Date     BIOPSY OF SKIN LESION  2011     retinal detachment surgery  July 4, 2008     RETINAL REATTACHMENT Right 7-4-2008     THORACOSCOPIC BIOPSY LUNG  2001       OBJECTIVE  PATIENT REPORTED MEASURES  Patient Supplied Answers To VHI Questionnaire  Voice Handicap Index (VHI-10) 7/6/2017   How often do you have any of the following symptoms:  Indigestion, heartburn, chest pain, stomach acid coming up, and/or tasting acid in your mouth or throat? Other (Speak with your Provider)   (F1) My voice makes it difficult for people to hear me. Almost never   (F2) People have difficulty understanding me in a noisy room. Almost never   (F8) My voice difficulties restrict my personal and social life. Sometimes   (F9) I feel left out of conversations because of my voice. Almost never   (F10) My voice problem causes me to lose income. Never   (P5) I feel as though I have to strain to produce voice. Almost always   (P6) The clarity of my voice is unpredictable. Almost always   (E4) My voice problem upsets me. Sometimes   (E6) My voice makes me feel handicapped. Almost never   (P3) People ask, \"What's wrong with your voice?\" Never   VHI Total Score 14       Patient Supplied Answers To CSI Questionnaire  Cough Severity Index (CSI) 7/6/2017   My cough is worse when I lie down. Sometimes   My coughing problem causes me to restrict my personal and social life. Almost never   I tend to avoid places because of my cough problem. Almost never   I feel " "embarrassed because of my coughing problem. Sometimes   People ask, \"What's wrong?\" because I cough a lot. Sometimes   I run out of air when I cough. Never   My coughing problem affects my voice. Almost always   My coughing problem limits my physical activity. Never   My coughing problem upsets me. Almost never   People ask me if I am sick because I cough a lot. Sometimes   CSI Total Score 14       Patient Supplied Answers To EAT Questionnaire  Eating Assessment Tool (EAT-10) 7/6/2017   My swallowing problem has caused me to lose weight. 0   My swallowing problem interferes with my ability to go out for meals. 0   Swallowing solids takes extra effort. 1   Swallowing pills takes extra effort. 1   Swallowing is painful. 0   The pleasure of eating is affected by my swallowing. 1   When I swallow food sticks in my throat. 2   I cough when I eat. 2   Swallowing is stressful. 0   How often do things go down the wrong way when you swallow? Weekly   Have you had pneumonia, bronchitis, or another severe lung infection in the last 6 months? No   EAT Total Score 8     PERCEPTUAL EVALUATION (CPT 66112)  POSTURE / TENSION:     upper body    neck and shoulders    BREATHING:     appears within normal limits and adequate at rest     clavicular elevation on inspiration    clavicular muscle use pattern     shallow    phonation is not coordinated with respiration    LARYNGEAL PALPATION:     supple musculature    No significant reduction of the thyrohyoid space    VOICE:    Roughness: Mild Intermittent    Breathiness: Mild to moderate Consistent    Strain: WNL     Mild back focus    Loudness    Conversational speech:  Mild to moderately reduced;Tends to speak with a softer voice.    Projected speech:  Mild to moderately reduced    Pitch:    Conversational speech:  WNL    Pitch glide: connected    Resonance:    Conversational speech:  WNL    Singing vs. Speech:  Singing voice is improved    CAPE-V Overall Severity:  31/100;  Global " Dysphonia Severity:  40/100    COUGH/THROAT CLEARING:    Not observed    LARYNGEAL EXAMINATION  Procedure: Flexible endoscopy with chip-tip technology with stroboscopy, right nostril; topical anesthesia with 3% Lidocaine and 0.25% phenylephrine was applied.   Performed by: Dr. Garsia   The laryngeal and pharyngeal structures were evaluated for gross appearance, mobility, function, and focal lesions / abnormalities of the associated mucosa.  Stroboscopy was warranted to evaluate closure, symmetry, and vibratory characteristics of the vocal folds.  All findings were within normal limits with the exception of the following salient features:     Incoordination noted between breath flow and phonation during repetitive tasks.    Glottic coups improved adduction and volume.    Mild hyperabduction intermittently noted.    Stroboscopy:    Normal amplitude bilaterally    Intermittent asymmetry    Complete adduction observed    Moderate supraglottic hyperfunction    Slightly open phase dominant    The laryngeal exam was reviewed with Ms. Carrington, and I provided pertinent explanations, as well as written and oral information.    ASSESSMENT / PLAN  IMPRESSIONS: R49.0 (Dysphonia) and R68.89 (Chronic Throat Clearing)     Laryngeal evaluation demonstrated a relatively healthy larynx with an imbalance in respiratory mechanics and speech, and supraglottic hyperfunction.    Dysphonia/discomfort is accounted for by the hyperfunction and imbalanced function of the intrinsic and extrinsic laryngeal musculature    STIMULABILITY: results of therapy probes during perceptual and laryngeal evaluation demonstrate improvement with use of yawn sigh  RECOMMENDATIONS:     A course of speech therapy is recommended to optimize vocal technique, improve voice quality and promote reduced discomfort, effort and fatigue.    She demonstrates a Good prognosis for improvement given adherence to therapeutic recommendations. Therapeutic     Positive  indicators: motivation and positive response to therapeutic probes    Negative indicators: none    DURATION / FREQUENCY: 2 one-hour weekly sessions, followed by 2 one-hour bi-weekly sessions.    GOALS:  Patient goal:   1. To improve and maintain a healthy voice quality  2. To understand the problem and fix it as much as possible    Short-term goal(s): Within the first 4 sessions, Ms. Carrington:  1. will be able to demonstrate provided cough suppression and substitution strategies from memory independently with 90% accuracy  2. will be able to independently list key factors in maintenance of good vocal hygiene with 80% accuracy, and report on their use outside the therapy room.  3. will utilize silent inhalation with good low-respiratory engagement 75% of the time during therapy tasks with minimal clinician support    Long-term goal(s): In 6 months, Ms. Carrington will:  Report a week of typical vocal activities, in which dysphonia and discomfort do not exceed a level of 2 out of 10, 80% of the time   Report a speaking and singing voice quality that is acceptable to her, 90%of the time    This treatment plan was developed with the patient who agreed with the recommendations.    TOTAL SERVICE :   EVALUATION OF VOICE AND RESONANCE: (07259)  NO CHARGE FACILITY FEE (19971)    Petrona Hoffman M.M. (voice) MSusannaA., CCC/SLP  Speech-Language Pathologist  [facility]  [phone]

## 2017-07-07 NOTE — PROGRESS NOTES
[facility] VOICE CLINIC  Shreyas Holder Jr., M.D., F.A.C.S.  Alisa Garsia M.D., M.P.H.  Kimberly Laguna, Ph.D., CCC/SLP  Petrona Hoffman M.M. (voice), M.A., CCC/SLP  Ady Cedeno M.M. (voice), MVALENTINA., [facility]/SLP    Patient: Anan Carrington  Date of Visit: 7/7/2017    CHIEF COMPLAINT: Dysphonia    HISTORY  Anna Carrington was seen for initial voice evaluation and laryngeal examination in conjunction with a visit to Dr. Garsia.  Please refer to Dr. Garsia s dictation for additional history and impressions. Salient details of her history are as follows:    Ms. Carrington is a 51 year old female with a history of dysphonia.    Symptoms began several years ago and have gradually progressed.    CURRENT SYMPTOMS INCLUDE  VOICE    Had therapy back in 2012 somewhere; gradually worsening, but not sure if it continues to progress.    Trouble singing; enjoys avocational singing.    Lower is ok, but upper range is limited even C6    Speaking voice is also affected - little raspier, can get volume, but reduced; quicker to fatigue  COUGH/THROAT CLEARING    Long Hx of cough and throat clearing; not sure if it is really productive, more habitual.    Singing at a [employer] party was difficult.     her cough/ throat clearing triggers include:  o Worse after eating  o Talking (also fatigues)  SWALLOWING/THROAT SYMPTOMS    More effortful to swallow; occasionally things go down the wrong way. Maybe more liquids.    Now more aware, where she used to not think about it.   BREATHING    Denies any issue  ADDITIONAL    Had voice lessons in college.    OTHER PERTINENT HISTORY    Please also refer to Dr. Garsia s dictation for additional history and impressions.    Flonase started 1-2 mo ago with PCP; and chest scan was clear.  Just ran out of the flonase, and     Past Medical History:   Diagnosis Date     CNS vasculitis (H)      Dysphonia 2016    Difficulty with my voice (persistent throat clearing, cough,     Hearing problem      Hoarseness  "2016    difficulty singing, tiredness has been going on for a year+.     Numbness     heel of foot     Tinnitus 5 years ago?     Weakness      Past Surgical History:   Procedure Laterality Date     BIOPSY OF SKIN LESION  2011     retinal detachment surgery  July 4, 2008     RETINAL REATTACHMENT Right 7-4-2008     THORACOSCOPIC BIOPSY LUNG  2001       OBJECTIVE  PATIENT REPORTED MEASURES  Patient Supplied Answers To VHI Questionnaire  Voice Handicap Index (VHI-10) 7/6/2017   How often do you have any of the following symptoms:  Indigestion, heartburn, chest pain, stomach acid coming up, and/or tasting acid in your mouth or throat? Other (Speak with your Provider)   (F1) My voice makes it difficult for people to hear me. Almost never   (F2) People have difficulty understanding me in a noisy room. Almost never   (F8) My voice difficulties restrict my personal and social life. Sometimes   (F9) I feel left out of conversations because of my voice. Almost never   (F10) My voice problem causes me to lose income. Never   (P5) I feel as though I have to strain to produce voice. Almost always   (P6) The clarity of my voice is unpredictable. Almost always   (E4) My voice problem upsets me. Sometimes   (E6) My voice makes me feel handicapped. Almost never   (P3) People ask, \"What's wrong with your voice?\" Never   VHI Total Score 14       Patient Supplied Answers To CSI Questionnaire  Cough Severity Index (CSI) 7/6/2017   My cough is worse when I lie down. Sometimes   My coughing problem causes me to restrict my personal and social life. Almost never   I tend to avoid places because of my cough problem. Almost never   I feel embarrassed because of my coughing problem. Sometimes   People ask, \"What's wrong?\" because I cough a lot. Sometimes   I run out of air when I cough. Never   My coughing problem affects my voice. Almost always   My coughing problem limits my physical activity. Never   My coughing problem upsets me. Almost " never   People ask me if I am sick because I cough a lot. Sometimes   CSI Total Score 14       Patient Supplied Answers To EAT Questionnaire  Eating Assessment Tool (EAT-10) 7/6/2017   My swallowing problem has caused me to lose weight. 0   My swallowing problem interferes with my ability to go out for meals. 0   Swallowing solids takes extra effort. 1   Swallowing pills takes extra effort. 1   Swallowing is painful. 0   The pleasure of eating is affected by my swallowing. 1   When I swallow food sticks in my throat. 2   I cough when I eat. 2   Swallowing is stressful. 0   How often do things go down the wrong way when you swallow? Weekly   Have you had pneumonia, bronchitis, or another severe lung infection in the last 6 months? No   EAT Total Score 8     PERCEPTUAL EVALUATION (CPT 03378)  POSTURE / TENSION:     upper body    neck and shoulders    BREATHING:     appears within normal limits and adequate at rest     clavicular elevation on inspiration    clavicular muscle use pattern     shallow    phonation is not coordinated with respiration    LARYNGEAL PALPATION:     supple musculature    No significant reduction of the thyrohyoid space    VOICE:    Roughness: Mild Intermittent    Breathiness: Mild to moderate Consistent    Strain: WNL     Mild back focus    Loudness    Conversational speech:  Mild to moderately reduced;Tends to speak with a softer voice.    Projected speech:  Mild to moderately reduced    Pitch:    Conversational speech:  WNL    Pitch glide: connected    Resonance:    Conversational speech:  WNL    Singing vs. Speech:  Singing voice is improved    CAPE-V Overall Severity:  31/100;  Global Dysphonia Severity:  40/100    COUGH/THROAT CLEARING:    Not observed    LARYNGEAL EXAMINATION  Procedure: Flexible endoscopy with chip-tip technology with stroboscopy, right nostril; topical anesthesia with 3% Lidocaine and 0.25% phenylephrine was applied.   Performed by: Dr. Garsia   The laryngeal and  pharyngeal structures were evaluated for gross appearance, mobility, function, and focal lesions / abnormalities of the associated mucosa.  Stroboscopy was warranted to evaluate closure, symmetry, and vibratory characteristics of the vocal folds.  All findings were within normal limits with the exception of the following salient features:     Incoordination noted between breath flow and phonation during repetitive tasks.    Glottic coups improved adduction and volume.    Mild hyperabduction intermittently noted.    Stroboscopy:    Normal amplitude bilaterally    Intermittent asymmetry    Complete adduction observed    Moderate supraglottic hyperfunction    Slightly open phase dominant    The laryngeal exam was reviewed with Ms. Carrington, and I provided pertinent explanations, as well as written and oral information.    ASSESSMENT / PLAN  IMPRESSIONS: R49.0 (Dysphonia) and R68.89 (Chronic Throat Clearing)     Laryngeal evaluation demonstrated a relatively healthy larynx with an imbalance in respiratory mechanics and speech, and supraglottic hyperfunction.    Dysphonia/discomfort is accounted for by the hyperfunction and imbalanced function of the intrinsic and extrinsic laryngeal musculature    STIMULABILITY: results of therapy probes during perceptual and laryngeal evaluation demonstrate improvement with use of yawn sigh  RECOMMENDATIONS:     A course of speech therapy is recommended to optimize vocal technique, improve voice quality and promote reduced discomfort, effort and fatigue.    She demonstrates a Good prognosis for improvement given adherence to therapeutic recommendations. Therapeutic     Positive indicators: motivation and positive response to therapeutic probes    Negative indicators: none    DURATION / FREQUENCY: 2 one-hour weekly sessions, followed by 2 one-hour bi-weekly sessions.    GOALS:  Patient goal:   1. To improve and maintain a healthy voice quality  2. To understand the problem and fix it as  much as possible    Short-term goal(s): Within the first 4 sessions, Ms. Carrington:  1. will be able to demonstrate provided cough suppression and substitution strategies from memory independently with 90% accuracy  2. will be able to independently list key factors in maintenance of good vocal hygiene with 80% accuracy, and report on their use outside the therapy room.  3. will utilize silent inhalation with good low-respiratory engagement 75% of the time during therapy tasks with minimal clinician support    Long-term goal(s): In 6 months, Ms. Carrington will:  Report a week of typical vocal activities, in which dysphonia and discomfort do not exceed a level of 2 out of 10, 80% of the time   Report a speaking and singing voice quality that is acceptable to her, 90%of the time    This treatment plan was developed with the patient who agreed with the recommendations.    TOTAL SERVICE :   EVALUATION OF VOICE AND RESONANCE: (36801)  NO CHARGE FACILITY FEE (72792)    Petrona Hoffman M.M. (voice) MSusannaA., CCC/SLP  Speech-Language Pathologist  [facility]  [phone]

## 2017-07-08 ENCOUNTER — HEALTH MAINTENANCE LETTER (OUTPATIENT)
Age: 52
End: 2017-07-08

## 2017-07-11 ENCOUNTER — PRE VISIT (OUTPATIENT)
Dept: OTOLARYNGOLOGY | Facility: CLINIC | Age: 52
End: 2017-07-11

## 2017-07-11 NOTE — TELEPHONE ENCOUNTER
1.  Date/reason for appt:  7/18/17   Eval/Manage    2.  Referring provider:  Dr Sanches, Internal    3.  Call to patient (Yes / No - short description):  No, referred    Records reviewed.  All records are in Epic

## 2017-07-14 PROBLEM — R49.0 DYSPHONIA: Status: ACTIVE | Noted: 2017-07-14

## 2017-07-14 PROBLEM — R09.89 CHRONIC THROAT CLEARING: Status: ACTIVE | Noted: 2017-07-14

## 2017-07-18 ENCOUNTER — OFFICE VISIT (OUTPATIENT)
Dept: OTOLARYNGOLOGY | Facility: CLINIC | Age: 52
End: 2017-07-18

## 2017-07-18 VITALS — BODY MASS INDEX: 23.79 KG/M2 | HEIGHT: 68 IN | WEIGHT: 157 LBS

## 2017-07-18 DIAGNOSIS — H90.71 MIXED CONDUCTIVE AND SENSORINEURAL HEARING LOSS OF RIGHT EAR WITH UNRESTRICTED HEARING OF LEFT EAR: Primary | ICD-10-CM

## 2017-07-18 DIAGNOSIS — H93.13 TINNITUS, BILATERAL: ICD-10-CM

## 2017-07-18 DIAGNOSIS — H61.23 EXCESSIVE CERUMEN IN BOTH EAR CANALS: ICD-10-CM

## 2017-07-18 ASSESSMENT — PAIN SCALES - GENERAL: PAINLEVEL: NO PAIN (0)

## 2017-07-18 NOTE — LETTER
7/18/2017       RE: Anna Carrington  [address] [address]     Dear Colleague,    Thank you for referring your patient, Anna Carrington, to the [facility] EAR NOSE AND THROAT at [facility]. Please see a copy of my visit note below.    Dear Jeanette Joy:    I had the pleasure of meeting Anna Carrington in consultation today at the [facility] at your request.    HISTORY OF PRESENT ILLNESS:  Patient is a 51-year-old in today for assessment of right hearing loss. She did have a hearing test performed in 2012, did not have a physician look at that. She says she's noticed a slowly increasing hearing loss  Since that first test. She does have bilateral tinnitus, right seems worse than the left. She has not had any dizziness. She denies any pain or drainage, no history of adult ear infections.She denies any dysphagia or facial paresthesias. She has had some voice changes, a recent scoping was negative and she scheduled for speech therapy. She says her mother had some type of surgery at the [facility] for hearing, apparently was not successful. No significant noise exposure.  She did have an MRI today which I reviewed and looks normal to my exam.    ALLERGIES:    Allergies   Allergen Reactions     Hay Fever & [A.R.M.] Itching     Minocycline Other (See Comments)     Vasculitis in the brain.     Tetracycline Other (See Comments)     Lesions on the brain, right sided paralysis        HABITS:   Alcohol use Yes   Comment: 2 glasses of wine or beer 2 times per week    History   Smoking Status     Never Smoker   Smokeless Tobacco     Not on file         PAST MEDICAL HISTORY: Please see today's intake form (for the remainder of the PMH) which I reviewed and signed.  Past Medical History:   Diagnosis Date     CNS vasculitis (H)      Dysphonia 2016    Difficulty with my voice (persistent throat clearing, cough,     Hearing  problem      Hoarseness 2016    difficulty singing, tiredness has been going on for a year+.     Numbness     heel of foot     Tinnitus 5 years ago?     Weakness        FAMILY HISTORY/SOCIAL HISTORY:   Family History   Problem Relation Age of Onset     Neurologic Disorder Sister      MS     Neurologic Disorder Father      MS     Retinal detachment Father      CANCER Father      colon     CANCER Mother      breast     Macular Degeneration Maternal Grandfather      Glaucoma No family hx of     Social History     Social History     Marital status:      Spouse name: N/A     Number of children: N/A     Years of education: N/A     Occupational History     Not on file.     Social History Main Topics     Smoking status: Never Smoker     Smokeless tobacco: Not on file     Alcohol use Yes      Comment: 2 glasses of wine or beer 2 times per week     Drug use: No     Sexual activity: Not Currently     Partners: Male     Birth control/ protection: None     Other Topics Concern     Not on file     Social History Narrative       REVIEW OF SYSTEMS: Please see today's intake form (for the remainder of the ROS) which I have reviewed and signed.    PHYSICIAL EXAMINATION:  Constitutional: The patient was well-groomed and in no acute distress.   Skin: Warm and pink.  Psychiatric: The patient's affect was calm, cooperative, and appropriate.   Respiratory: Breathing comfortably without stridor or exertion of accessory muscles.  Eyes: Pupils were equal and reactive. Extraocular movement intact.   Head: Normocephalic and atraumatic. No lesions or scars.  Ears:  Both ears examined under microscope with the finding of cerumen. Under high-power magnification, the right side was cleaned of cerumen with curettes. Following cleaning, TM and middle ear looked normal. The opposite ear was cleaned and examined using microscope, curet, and similar techniques. TM and mid  Nose: Sinuses were nontender. Anterior rhinoscopy revealed midline septum  and absence of purulence or polyps.  Oral Cavity: Normal tongue, floor of moth, buccal mucosa, and palate. No lesions or masses on inspection or palpation. No abnormal lymph tissue in the oropharynx.   Neck: The parotid is soft without masses. Supple with normal laryngeal and tracheal landmarks.   Lymphatic: There is no palpable lymphadenopathy or other masses in the neck.   Neurologic: Alert and oriented x 3. Cranial nerves III-XI within normal limits. Voice quality normal.  Cerebellar Function Tests:  Grossly normal    Audiogram:  udiogram performed shows mild left high-frequency sensory hearing loss above 4000 Hz, right ear shows a mixed hearing loss with significant conductive component. Tuning first confirm with a negative response on the right side positive on the left. Good discrimination levels at 100% and 96%.    IMPRESSION AND PLAN: Talked with her for some time  And went over with her her normal anatomy today, and what looks to be a mixed hearing loss in the right side. This has progressed especially with the conductive component since 2012. I suspect she may have otosclerosis and we discussed that in detail.  Options for surgery or hearing aid discussed in detail.  All questions were answered.  She is going to think about our discussion and do some research on her own. If she is interested in surgery, I recommended she return to see Dr. Fang for her assessment and recommendations. Otherwise follow up in a year to monitor.we will also check the radiology report on her MRI scan, let her know if this is different than my reading today.    Thank you very much for the opportunity to participate in the care of your patient.    Rick L Nissen MD

## 2017-07-18 NOTE — MR AVS SNAPSHOT
After Visit Summary   7/18/2017    Anna Carrington    MRN: [id number]           Patient Information     Date Of Birth          1965        Visit Information        Provider Department      7/18/2017 9:30 AM Nissen, Rick L, MD [facility]        Today's Diagnoses     Mixed conductive and sensorineural hearing loss of right ear with unrestricted hearing of left ear    -  1    Excessive cerumen in both ear canals        Tinnitus, bilateral           Follow-ups after your visit        Who to contact     Please call your clinic at [phone] to:    Ask questions about your health    Make or cancel appointments    Discuss your medicines    Learn about your test results    Speak to your doctor   If you have compliments or concerns about an experience at your clinic, or if you wish to file a complaint, please contact [facility] Patient Relations at [phone] or email us at [email or web address]         Additional Information About Your Visit        MyChart Information     [employer] gives you secure access to your electronic health record. If you see a primary care provider, you can also send messages to your care team and make appointments. If you have questions, please call your primary care clinic.  If you do not have a primary care provider, please call [phone] and they will assist you.      [employer] is an electronic gateway that provides easy, online access to your medical records. With [employer], you can request a clinic appointment, read your test results, renew a prescription or communicate with your care team.     To access your existing account, please contact your [facility] or call [phone] for assistance.        Care EveryWhere ID     This is your Care EveryWhere ID. This could be used by other organizations to access your [place] medical records  [id number]        Your Vitals Were     Height  "BMI (Body Mass Index)                1.727 m (5' 8\") 23.87 kg/m2           Blood Pressure from Last 3 Encounters:   11/21/12 123/84   08/15/12 122/72    Weight from Last 3 Encounters:   07/18/17 71.2 kg (157 lb)   07/07/17 71.7 kg (158 lb)   06/01/17 72.1 kg (159 lb)              We Performed the Following     BINOCULAR MICROSCOPY        Primary Care Provider Office Phone # Fax #    Jeanette Luna [phone]       [address] [address]        Equal Access to Services     [facility]: Hadii cassia [place], wagianni simpson, bernadine kaalmada eleonora, rahel spain . So [facility] [phone].    ATENCIÓN: Si habla español, tiene a loyola disposición servicios gratuitos de asistencia lingüística. [facility] [phone].    We comply with applicable federal civil rights laws and [place] laws. We do not discriminate on the basis of race, color, national origin, age, disability sex, sexual orientation or gender identity.            Thank you!     Thank you for choosing [facility] EAR NOSE AND THROAT  for your care. Our goal is always to provide you with excellent care. Hearing back from our patients is one way we can continue to improve our services. Please take a few minutes to complete the written survey that you may receive in the mail after your visit with us. Thank you!             Your Updated Medication List - Protect others around you: Learn how to safely use, store and throw away your medicines at www.disposemymeds.org.          This list is accurate as of: 7/18/17 11:53 AM.  Always use your most recent med list.                   Brand Name Dispense Instructions for use Diagnosis    CALCIUM 500+D PO      Take 1 tablet by mouth 2 times daily.        CLARITIN 10 MG capsule   Generic drug:  loratadine      Take 10 mg by mouth daily        DAILY VITAMIN PO      Take 1 tablet by mouth daily.        fluticasone 50 MCG/ACT spray    " FLONASE     Spray 2 sprays into both nostrils daily        OMEGA-3 FISH OIL PO

## 2017-07-18 NOTE — NURSING NOTE
Chief Complaint   Patient presents with     Consult     further evaluation     [facility]

## 2017-07-18 NOTE — PROGRESS NOTES
Dear Jeanette Joy:    I had the pleasure of meeting Anna Carrington in consultation today at the [facility] at your request.    HISTORY OF PRESENT ILLNESS:  Patient is a 51-year-old in today for assessment of right hearing loss. She did have a hearing test performed in 2012, did not have a physician look at that. She says she's noticed a slowly increasing hearing loss  Since that first test. She does have bilateral tinnitus, right seems worse than the left. She has not had any dizziness. She denies any pain or drainage, no history of adult ear infections.She denies any dysphagia or facial paresthesias. She has had some voice changes, a recent scoping was negative and she scheduled for speech therapy. She says her mother had some type of surgery at the [facility] for hearing, apparently was not successful. No significant noise exposure.  She did have an MRI today which I reviewed and looks normal to my exam.    ALLERGIES:    Allergies   Allergen Reactions     Hay Fever & [A.R.M.] Itching     Minocycline Other (See Comments)     Vasculitis in the brain.     Tetracycline Other (See Comments)     Lesions on the brain, right sided paralysis        HABITS:   Alcohol use Yes   Comment: 2 glasses of wine or beer 2 times per week    History   Smoking Status     Never Smoker   Smokeless Tobacco     Not on file         PAST MEDICAL HISTORY: Please see today's intake form (for the remainder of the PMH) which I reviewed and signed.  Past Medical History:   Diagnosis Date     CNS vasculitis (H)      Dysphonia 2016    Difficulty with my voice (persistent throat clearing, cough,     Hearing problem      Hoarseness 2016    difficulty singing, tiredness has been going on for a year+.     Numbness     heel of foot     Tinnitus 5 years ago?     Weakness        FAMILY HISTORY/SOCIAL HISTORY:   Family History   Problem Relation Age of Onset     Neurologic Disorder Sister      MS      Neurologic Disorder Father      MS     Retinal detachment Father      CANCER Father      colon     CANCER Mother      breast     Macular Degeneration Maternal Grandfather      Glaucoma No family hx of     Social History     Social History     Marital status:      Spouse name: N/A     Number of children: N/A     Years of education: N/A     Occupational History     Not on file.     Social History Main Topics     Smoking status: Never Smoker     Smokeless tobacco: Not on file     Alcohol use Yes      Comment: 2 glasses of wine or beer 2 times per week     Drug use: No     Sexual activity: Not Currently     Partners: Male     Birth control/ protection: None     Other Topics Concern     Not on file     Social History Narrative       REVIEW OF SYSTEMS: Please see today's intake form (for the remainder of the ROS) which I have reviewed and signed.    PHYSICIAL EXAMINATION:  Constitutional: The patient was well-groomed and in no acute distress.   Skin: Warm and pink.  Psychiatric: The patient's affect was calm, cooperative, and appropriate.   Respiratory: Breathing comfortably without stridor or exertion of accessory muscles.  Eyes: Pupils were equal and reactive. Extraocular movement intact.   Head: Normocephalic and atraumatic. No lesions or scars.  Ears:  Both ears examined under microscope with the finding of cerumen. Under high-power magnification, the right side was cleaned of cerumen with curettes. Following cleaning, TM and middle ear looked normal. The opposite ear was cleaned and examined using microscope, curet, and similar techniques. TM and mid  Nose: Sinuses were nontender. Anterior rhinoscopy revealed midline septum and absence of purulence or polyps.  Oral Cavity: Normal tongue, floor of moth, buccal mucosa, and palate. No lesions or masses on inspection or palpation. No abnormal lymph tissue in the oropharynx.   Neck: The parotid is soft without masses. Supple with normal laryngeal and tracheal  landmarks.   Lymphatic: There is no palpable lymphadenopathy or other masses in the neck.   Neurologic: Alert and oriented x 3. Cranial nerves III-XI within normal limits. Voice quality normal.  Cerebellar Function Tests:  Grossly normal    Audiogram:  udiogram performed shows mild left high-frequency sensory hearing loss above 4000 Hz, right ear shows a mixed hearing loss with significant conductive component. Tuning first confirm with a negative response on the right side positive on the left. Good discrimination levels at 100% and 96%.    IMPRESSION AND PLAN: Talked with her for some time  And went over with her her normal anatomy today, and what looks to be a mixed hearing loss in the right side. This has progressed especially with the conductive component since 2012. I suspect she may have otosclerosis and we discussed that in detail.  Options for surgery or hearing aid discussed in detail.  All questions were answered.  She is going to think about our discussion and do some research on her own. If she is interested in surgery, I recommended she return to see Dr. Fang for her assessment and recommendations. Otherwise follow up in a year to monitor.we will also check the radiology report on her MRI scan, let her know if this is different than my reading today.    Thank you very much for the opportunity to participate in the care of your patient.    Rick L Nissen MD

## 2017-09-12 ENCOUNTER — PRE VISIT (OUTPATIENT)
Dept: OTOLARYNGOLOGY | Facility: CLINIC | Age: 52
End: 2017-09-12

## 2017-09-12 NOTE — TELEPHONE ENCOUNTER
1.  Date/reason for appt: 10/3/17 - consult for possible otosclerosis    2.  Referring provider: Dr. Nissen    3.  Call to patient (Yes / No - short description): No, recs/img in epic    4.  Previous care at:    [facility]   [facility]

## 2017-09-15 ENCOUNTER — OFFICE VISIT (OUTPATIENT)
Dept: OTOLARYNGOLOGY | Facility: CLINIC | Age: 52
End: 2017-09-15

## 2017-09-15 DIAGNOSIS — R09.89 CHRONIC THROAT CLEARING: ICD-10-CM

## 2017-09-15 DIAGNOSIS — R49.0 DYSPHONIA: Primary | ICD-10-CM

## 2017-09-15 NOTE — PROGRESS NOTES
"After Visit Summary    Patient: Anna Carrington  Date of Visit: 9/15/2017    Hygiene:     Topical Hydration: hydration for the surface mucosa of the larynx and vocal folds.  o See tips for topical hydration handout  - Nasal Irrigation/Nasal Spray  - Gargling    With a voice    Tilt your head side to side    [place] chirp -  bin steward   - Sip liquids throughout the day  - Suck on a lozenge with Pectin (avoid mint or menthol) or a sugar-free candy, gum, \"wet\" snacks (apples, pineapple, grapes, etc)     Breathing:    In the morning and evening (twice daily) for 2-5 minutes:   o Breathe while lying on your back with your face and knees up. Hands on tummy and chest.  Take a breath in with rounded lips and exhale with a  Shhhh    o Inhale  = Inflate; exhale = deflate  o 3x each: try breathin in/8 out, 5/10, 3/4  o Throughout the day (2-3x/day for just a couple minutes) check breathing while keeping shoulders relaxed (riding to and from school, etc.)    Breathing Tips:  o Keep shoulders down  o Don t overextend your neck - hand on the neck to check periodically to check for tension.    Voice:    Hummmm/ tongue trill/ Bubbles (straw in 1.5 to 2  of water) 4x/day 1-2 min:  o blow 10-15 seconds with no voice and keep bubbles consistent.  o 3x: blow bubbles and add a sustained  who  or an  oo  (G3)  o 3x: blow bubbles and vary  who  gliding up and down  (1-5-1; today went up and down by 1/2 steps just a little bit)             Up and down like a sine wave  o 3x: blow bubbles on a sustained/ varied pitch soft to loud to soft (messa di voce)  1, 3, 5, 3, 1    o 1-2x: Happy birthday bubbles (keep connected)  These exercises are great for:    *warm up / cool down - Part of the morning routing and before and after extended voice use.    *tissue mobilization exercise - Improving the condition and pliability of the vocal folds.    *Abdominal breathing and applying optimal breath flow to speech/singing.        u  words " (2-3 x per day)  o 5 words with the arm  o Breathe first and add a  yawn & sigh  shape to sound  o Descending 5th, then we repeated each work 3x with a 5-3-1     Spacious speech phrases  (2-3x per day)  o 2nd column  = H+vowels (monitor tongue placement - behind lower teeth)  o First column = through Hello-Hello-Hello 5-1, or 3-5-1    *Think Twangy - keep the sound forward    Petrona Hoffman M.M. (voice), M.A., CCC/SLP  Speech-Language Pathologist  [facility]  [phone]

## 2017-09-15 NOTE — MR AVS SNAPSHOT
After Visit Summary   9/15/2017    Anna Carrington    MRN: [id number]           Patient Information     Date Of Birth          1965        Visit Information        Provider Department      9/15/2017 2:00 PM Petrona Hoffman SLP [facility]        Today's Diagnoses     Dysphonia    -  1    Chronic throat clearing           Follow-ups after your visit        Your next 10 appointments already scheduled     Sep 27, 2017  9:00 AM CDT   (Arrive by 8:45 AM)   Return Visit with [facility] ([facility])    [address] [place] [id number] [phone]            Oct 03, 2017  8:40 AM CDT   (Arrive by 8:25 AM)   NEW NEUROTOLOGY VISIT with MD AYALA Vieyra [facility] ([facility])    [address] [place] [id number] [phone]            Oct 11, 2017  8:00 AM CDT   (Arrive by 7:45 AM)   Return Visit with [facility] ([facility])    [address] [place] [id number] [phone]              Who to contact     Please call your clinic at [phone] to:    Ask questions about your health    Make or cancel appointments    Discuss your medicines    Learn about your test results    Speak to your doctor   If you have compliments or concerns about an experience at your clinic, or if you wish to file a complaint, please contact [facility] Patient Relations at [phone] or email us at [email or web address]         Additional Information About Your Visit        MyChart Information     [employer] gives you secure access to your electronic health record. If you see a primary care provider, you can also send messages to your care team and make appointments. If you have questions, please call your primary care clinic.  If you do not have a  primary care provider, please call [phone] and they will assist you.      OneCard is an electronic gateway that provides easy, online access to your medical records. With OneCard, you can request a clinic appointment, read your test results, renew a prescription or communicate with your care team.     To access your existing account, please contact your [facility] or call [phone] for assistance.        Care EveryWhere ID     This is your Care EveryWhere ID. This could be used by other organizations to access your [place] medical records  [id number]         Blood Pressure from Last 3 Encounters:   11/21/12 123/84   08/15/12 122/72    Weight from Last 3 Encounters:   07/18/17 71.2 kg (157 lb)   07/07/17 71.7 kg (158 lb)   06/01/17 72.1 kg (159 lb)              We Performed the Following     SPEECH/HEARING THERAPY, INDIVIDUAL        Primary Care Provider Office Phone # Fax #    Jeanette Luna [phone]       [place] [address]        Equal Access to Services     [facility]: Hadii aad ku hadasho Soomaali, waaxda luqadaha, qaybta kaalmada adeegyaeliot, rahel spain . So [facility] [phone].    ATENCIÓN: Si habla español, tiene a loyola disposición servicios gratuitos de asistencia lingüística. Sohail al [phone].    We comply with applicable federal civil rights laws and [place] laws. We do not discriminate on the basis of race, color, national origin, age, disability sex, sexual orientation or gender identity.            Thank you!     Thank you for choosing  [email or web address]  for your care. Our goal is always to provide you with excellent care. Hearing back from our patients is one way we can continue to improve our services. Please take a few minutes to complete the written survey that you may receive in the mail after your visit with us. Thank you!             Your Updated Medication List -  Protect others around you: Learn how to safely use, store and throw away your medicines at www.disposemymeds.org.          This list is accurate as of: 9/15/17 11:59 PM.  Always use your most recent med list.                   Brand Name Dispense Instructions for use Diagnosis    CALCIUM 500+D PO      Take 1 tablet by mouth 2 times daily.        CLARITIN 10 MG capsule   Generic drug:  loratadine      Take 10 mg by mouth daily        DAILY VITAMIN PO      Take 1 tablet by mouth daily.        fluticasone 50 MCG/ACT spray    FLONASE     Spray 2 sprays into both nostrils daily        OMEGA-3 FISH OIL PO

## 2017-09-15 NOTE — LETTER
9/15/2017       RE: Anna Carrington  [address] [address]     Dear Colleague,    Thank you for referring your patient, Anna Carrington, to the [facility] at [facility]. Please see a copy of my visit note below.    [facility]  Shreyas Holder Jr., M.D., F.A.C.S.  Alisa Garsia M.D., M.P.H.  Kimberly Laguna, Ph.D., CCC/SLP  Petrona Hoffman M.M. (voice), M.A., CCC/SLP  Ady Cdeeno M.M. (voice), M.A., [facility]/SLP    [facility]  VOICE/SPEECH/BREATHING THERAPY PROGRESS REPORT    Patient: Anna Carrington  Date of Service: 9/15/2017    PROGRESS SINCE LAST SESSION  Ms. Carrington was seen for evaluation on 7/7/17.  At that time, it was determined that:  IMPRESSIONS: R49.0 (Dysphonia) and R68.89 (Chronic Throat Clearing)     Laryngeal evaluation demonstrated a relatively healthy larynx with an imbalance in respiratory mechanics and speech, and supraglottic hyperfunction.    Dysphonia/discomfort is accounted for by the hyperfunction and imbalanced function of the intrinsic and extrinsic laryngeal musculature    STIMULABILITY: results of therapy probes during perceptual and laryngeal evaluation demonstrate improvement with use of yawn sigh    Since then, she states she has not had any change in her voice, which is expected, as no therapeutic suggestions were made at the time.    Ms. Carrington also states that:    Developed an upper respiratory infection shortly after Labor Day.  Increased fluids and airborne and zinc.    Still a little coughing and thickness in the chest, but it has improved.    Has had a series of URI issues.     Ms. Carrington presents today with the following:  VOICE:    Roughness: Mild Intermittent    Breathiness: Mild to moderate Consistent    Strain: WNL     Mild back focus    Loudness    Conversational speech:  Mild to moderately reduced;Tends to speak with a softer voice.    Projected speech:  Mild to moderately  reduced    THERAPEUTIC ACTIVITIES  Today Ms. Carrington participated in the following therapeutic activities:    [place] concepts and technqiues for using saline and plain-water gargling, nasal irrigation, and steam to reduce the thickened secretions / laryngeal irritation.    [place] concepts and techniques for improving topical and systemic hydration     [place] exercises for optimal respiratory mechanics for speech and singing.  o I provided explanation of the anatomy and physiology of respiration for speech and singing; she found this to be helpful  o she demonstrated clavicular/neck/shoulder involvement in inhalation  o demonstrated difficulty allowing abdominal relaxation for inhalation  o with guidance, learned improved abdominal relaxation for inhalation  o learned techniques for optimal airflow on exhalation  o practiced in prone and supine positions on the massage table; this was helpful  o [place] a respiratory pacing exercise; this was helpful.  o good learning, but will need practice    [place] exercises to add phonation to the optimal flowing airstream.  o Semi-occluded vocal tract exercises with /m/, tongue trill or straw phonation with water resistance were most facilitating; the benefits of each activity were discussed.  o Instructed to use as a voice warm up, cool down, coordination of breath flow with phonation, and for tissue mobilization.  o good learning, but will need practice     [place] exercises for improved airflow during phonation.  o speech material with /ju/ glides, aspirate+vowels, and easy onset phrases was facilitating  o Instructed at the word and phrase level.  o Instructed at the word level and with an arpeggio pattern.  o learned techniques to reduce glottal polanco and improve breath flow    [place] concepts of an optimal regimen for practice.  o she should use an interval schedule of practice, with brief periods of practice frequently throughout each day  o [place] concepts of  "volitional practice to facilitate motor learning.    I provided an after visit summary to help facilitate practice.    An audio recording of today's therapeutic activities was provided, to facilitate practice.    IMPRESSIONS/GOALS/PLAN  Ms. Carrington had a productive session of therapy today, working on techniques/strategies/exercises that will help her achieve her goal of acceptable and comfortable voice use, secondary to dysphonia and chronic throat clearing; allowing her to meet personal and professional vocal demands and fully engage in activities of daily living.     Progress toward long-term goals:   Minimal at this point, as this is first session, but good learning today    Goals for this practice period:     practice all exercises according to instructions    incorporate techniques into daily vocal activities    maintain vigilance for vocal technique    Plan: I will see Ms. Carrington in 2 weeks to work on education, modification, and carryover of therapeutic activities to more complex phonatory tasks.    IMPRESSIONS: R49.0 (Dysphonia) and R68.89 (Chronic Throat Clearing)     TOTAL SERVICE TIME: 60 minutes  TREATMENT (94458): 60 minutes  NO CHARGE FACILITY FEE (40943)    Petrona Hoffman M.M. (voice), M.A., CCC/SLP  Speech-Language Pathologist  [facility]  [phone]                After Visit Summary    Patient: Anna Carrington  Date of Visit: 9/15/2017    Hygiene:     Topical Hydration: hydration for the surface mucosa of the larynx and vocal folds.  o See tips for topical hydration handout  - Nasal Irrigation/Nasal Spray  - Gargling    With a voice    Tilt your head side to side    [place] chirnaseem -  karachealkaaa kaholley   - Sip liquids throughout the day  - Suck on a lozenge with Pectin (avoid mint or menthol) or a sugar-free candy, gum, \"wet\" snacks (apples, pineapple, grapes, etc)     Breathing:    In the morning and evening (twice daily) for 2-5 minutes:   o Breathe while lying on your back with " your face and knees up. Hands on tummy and chest.  Take a breath in with rounded lips and exhale with a  Shhhh    o Inhale  = Inflate; exhale = deflate  o 3x each: try breathin in/8 out, 5/10, 3/4  o Throughout the day (2-3x/day for just a couple minutes) check breathing while keeping shoulders relaxed (riding to and from school, etc.)    Breathing Tips:  o Keep shoulders down  o Don t overextend your neck - hand on the neck to check periodically to check for tension.    Voice:    Hummmm/ tongue trill/ Bubbles (straw in 1.5 to 2  of water) 4x/day 1-2 min:  o blow 10-15 seconds with no voice and keep bubbles consistent.  o 3x: blow bubbles and add a sustained  who  or an  oo  (G3)  o 3x: blow bubbles and vary  who  gliding up and down  (1-5-1; today went up and down by 1/2 steps just a little bit)             Up and down like a sine wave  o 3x: blow bubbles on a sustained/ varied pitch soft to loud to soft (messa di voce)  1, 3, 5, 3, 1    o 1-2x: Happy birthday bubbles (keep connected)  These exercises are great for:    *warm up / cool down - Part of the morning routing and before and after extended voice use.    *tissue mobilization exercise - Improving the condition and pliability of the vocal folds.    *Abdominal breathing and applying optimal breath flow to speech/singing.        u  words (2-3 x per day)  o 5 words with the arm  o Breathe first and add a  yawn & sigh  shape to sound  o Descending 5th, then we repeated each work 3x with a 5-3-1     Spacious speech phrases  (2-3x per day)  o 2nd column  = H+vowels (monitor tongue placement - behind lower teeth)  o First column = through Hello-Hello-Hello 5-1, or 3-5-1    *Think Twangy - keep the sound forward    Petrona Hoffman M.M. (voice), M.A., CCC/SLP  Speech-Language Pathologist  [facility]  [phone]

## 2017-09-27 ENCOUNTER — OFFICE VISIT (OUTPATIENT)
Dept: OTOLARYNGOLOGY | Facility: CLINIC | Age: 52
End: 2017-09-27

## 2017-09-27 DIAGNOSIS — R09.89 CHRONIC THROAT CLEARING: ICD-10-CM

## 2017-09-27 DIAGNOSIS — R49.0 DYSPHONIA: Primary | ICD-10-CM

## 2017-09-27 NOTE — MR AVS SNAPSHOT
After Visit Summary   9/27/2017    Anna Carrington    MRN: [id number]           Patient Information     Date Of Birth          1965        Visit Information        Provider Department      9/27/2017 9:00 AM Petrona Hoffman SLP M [employer]        Today's Diagnoses     Dysphonia    -  1    Chronic throat clearing           Follow-ups after your visit        Your next 10 appointments already scheduled     Oct 11, 2017  8:00 AM CDT   (Arrive by 7:45 AM)   Return Visit with SULEMAN Carreon [employer] ([facility])    [address] [place] [id number] [phone]              Who to contact     Please call your clinic at [phone] to:    Ask questions about your health    Make or cancel appointments    Discuss your medicines    Learn about your test results    Speak to your doctor   If you have compliments or concerns about an experience at your clinic, or if you wish to file a complaint, please contact [facility] Patient Relations at [phone] or email us at [email or web address]         Additional Information About Your Visit        MyChart Information     Clinc!t gives you secure access to your electronic health record. If you see a primary care provider, you can also send messages to your care team and make appointments. If you have questions, please call your primary care clinic.  If you do not have a primary care provider, please call [phone] and they will assist you.      Clinc!t is an electronic gateway that provides easy, online access to your medical records. With [employer], you can request a clinic appointment, read your test results, renew a prescription or communicate with your care team.     To access your existing account, please contact your [facility] or call [phone] for assistance.        Care EveryWhere ID     This is your Care  EveryWhere ID. This could be used by other organizations to access your [place] medical records  [id number]         Blood Pressure from Last 3 Encounters:   11/21/12 123/84   08/15/12 122/72    Weight from Last 3 Encounters:   10/03/17 71.2 kg (157 lb)   07/18/17 71.2 kg (157 lb)   07/07/17 71.7 kg (158 lb)              We Performed the Following     SPEECH/HEARING THERAPY, INDIVIDUAL        Primary Care Provider Office Phone # Fax #    Jeanette Luna [phone]       [address] [address]        Equal Access to Services     [facility] : Hadii cassia hair hadduco Sospenser, waaxda luqadaha, qaybta kaalmada ademichelleyada, rahel spain . So [facility] [phone].    ATENCIÓN: Si habla español, tiene a loyola disposición servicios gratuitos de asistencia lingüística. Ll[facility] [phone].    We comply with applicable federal civil rights laws and [place] laws. We do not discriminate on the basis of race, color, national origin, age, disability, sex, sexual orientation, or gender identity.            Thank you!     Thank you for choosing [facility]  for your care. Our goal is always to provide you with excellent care. Hearing back from our patients is one way we can continue to improve our services. Please take a few minutes to complete the written survey that you may receive in the mail after your visit with us. Thank you!             Your Updated Medication List - Protect others around you: Learn how to safely use, store and throw away your medicines at www.disposemymeds.org.          This list is accurate as of: 9/27/17 11:59 PM.  Always use your most recent med list.                   Brand Name Dispense Instructions for use Diagnosis    CALCIUM 500+D PO      Take 1 tablet by mouth 2 times daily.        CLARITIN 10 MG capsule   Generic drug:  loratadine      Take 10 mg by mouth daily        DAILY VITAMIN PO      Take 1 tablet by mouth  daily.        fluticasone 50 MCG/ACT spray    FLONASE     Spray 2 sprays into both nostrils daily        OMEGA-3 FISH OIL PO

## 2017-09-27 NOTE — LETTER
9/27/2017       RE: Anna Carrington  [address] [address]     Dear Colleague,    Thank you for referring your patient, Anna Carrington, to the [facility] at [facility]. Please see a copy of my visit note below.    [facility]  Shreyas Holder Jr., M.D., F.A.C.S.  Alisa Garsia M.D., M.P.H.  Kimberly Laguna, Ph.D., CCC/SLP  Petrona Hoffman M.M. (voice), M.A., CCC/SLP  Ady Cedeno M.M. (voice), M.A., [facility]/SLP    [facility]  VOICE/SPEECH/BREATHING THERAPY PROGRESS REPORT    Patient: Anna Carrington  Date of Service: 9/27/2017    PROGRESS SINCE LAST SESSION  Ms. Carrington was last seen on 9/15/17. At that time, she worked on learning therapeutic activities to improve her voice quality and comfort, secondary to dysphonia and chronic throat clearing; allowing  her to meet personal and professional vocal demands and fully engage in activities of daily living.    Regarding practice, Ms. Carrington reports the following:     Daily practice with and without the recording    the recording is not helpful    The after visit summary is helpful to facilitate practice.    voice is improved during the exercises; improving carryover to spontaneous conversation    she experiences improvement in fatigue and discomfort while doing the exercises    Ms. Carrington also states that:    More humming and coordination of breath during her practice; she has found them helpful.     Ms. Carrington presents today with the following:  VOICE:    Roughness: Mild Intermittent    Breathiness: Mild to moderate Consistent    Strain: WNL     Mild back focus    Loudness    Conversational speech:  Mild to moderately reduced;Tends to speak with a softer voice.    Projected speech:  Mild to moderately reduced    THERAPEUTIC ACTIVITIES  Today Ms. Carrington participated in the following therapeutic activities:    Demonstrated previous exercises.  o demonstrated improved  technique  o appropriate redirection provided  o instruction provided for increased level of complexity/difficulty    Exercises for improved airflow during phonation.  o speech material with /ju/ glides and easy onsets was facilitating  o Instructed at the word and phrase level.  o Instructed with a descending 5th, as well as an arpeggio pattern; this was helpful.  o learned techniques to reduce glottal polanco and improve breath flow    [place] exercises to experience a more forward sensation during phonation.  o speech material with nasal continuants was facilitating  o speech material that elicits a high, forward tongue position was facilitating  o able to recognize improvement in quality and comfort  o able to progress to level of m+ vowel combinations, word and phrases  o good learning, but will need practice    [place] concepts of an optimal regimen for practice.  o she should use an interval schedule of practice, with brief periods of practice frequently throughout each day  o [place] concepts of volitional practice to facilitate motor learning.    I provided written/printed materials to help facilitate practice.    IMPRESSIONS/GOALS/PLAN  Ms. Carrington had a productive session of therapy today, working on techniques/strategies/exercises that will help her achieve her goal of acceptable and comfortable voice use, secondary to dysphonia and chronic throat clearing; allowing her to meet personal and professional vocal demands and fully engage in activities of daily living.    Progress toward long-term goals:  Adequate progress; too early for objective measures    Goals for this practice period:     practice all exercises according to instructions    incorporate techniques into daily vocal activities    maintain vigilance for vocal technique    Plan: I will see Ms. Carrington in 2 weeks to work on education, modification, and carryover of therapeutic activities to more complex phonatory tasks.  IMPRESSIONS: R49.0  (Dysphonia) and R68.89 (Chronic Throat Clearing)      TOTAL SERVICE TIME: 60 minutes  TREATMENT (03059): 60 minutes  NO CHARGE FACILITY FEE (60352)     Petrona Hoffman M.M. (voice) MVALENTINA., CCC/SLP  Speech-Language Pathologist  [facility]  [phone]

## 2017-09-27 NOTE — PROGRESS NOTES
[facility] VOICE [facility]  Shreyas Holder Jr., M.D., F.A.C.S.  Alisa Garsia M.D., M.P.H.  Kimberly Laguna, Ph.D., CCC/SLP  Petrona Hoffman M.M. (voice), M.A., CCC/SLP  Ady Cedeno M.M. (voice), M.A., [facility]/SLP    [facility] VOICE [facility]  VOICE/SPEECH/BREATHING THERAPY PROGRESS REPORT    Patient: Anna Carrington  Date of Service: 9/27/2017    PROGRESS SINCE LAST SESSION  Ms. Carrington was last seen on 9/15/17. At that time, she worked on learning therapeutic activities to improve her voice quality and comfort, secondary to dysphonia and chronic throat clearing; allowing  her to meet personal and professional vocal demands and fully engage in activities of daily living.    Regarding practice, Ms. Carrington reports the following:     Daily practice with and without the recording    the recording is not helpful    The after visit summary is helpful to facilitate practice.    voice is improved during the exercises; improving carryover to spontaneous conversation    she experiences improvement in fatigue and discomfort while doing the exercises    Ms. Carrington also states that:    More humming and coordination of breath during her practice; she has found them helpful.     Ms. Carrington presents today with the following:  VOICE:    Roughness: Mild Intermittent    Breathiness: Mild to moderate Consistent    Strain: WNL     Mild back focus    Loudness    Conversational speech:  Mild to moderately reduced;Tends to speak with a softer voice.    Projected speech:  Mild to moderately reduced    THERAPEUTIC ACTIVITIES  Today Ms. Carrington participated in the following therapeutic activities:    Demonstrated previous exercises.  o demonstrated improved technique  o appropriate redirection provided  o instruction provided for increased level of complexity/difficulty    Exercises for improved airflow during phonation.  o speech material with /ju/ glides and easy onsets was facilitating  o Instructed at the word and phrase  level.  o Instructed with a descending 5th, as well as an arpeggio pattern; this was helpful.  o learned techniques to reduce glottal polanco and improve breath flow    [place] exercises to experience a more forward sensation during phonation.  o speech material with nasal continuants was facilitating  o speech material that elicits a high, forward tongue position was facilitating  o able to recognize improvement in quality and comfort  o able to progress to level of m+ vowel combinations, word and phrases  o good learning, but will need practice    [place] concepts of an optimal regimen for practice.  o she should use an interval schedule of practice, with brief periods of practice frequently throughout each day  o [place] concepts of volitional practice to facilitate motor learning.    I provided written/printed materials to help facilitate practice.    IMPRESSIONS/GOALS/PLAN  Ms. Carrington had a productive session of therapy today, working on techniques/strategies/exercises that will help her achieve her goal of acceptable and comfortable voice use, secondary to dysphonia and chronic throat clearing; allowing her to meet personal and professional vocal demands and fully engage in activities of daily living.    Progress toward long-term goals:  Adequate progress; too early for objective measures    Goals for this practice period:     practice all exercises according to instructions    incorporate techniques into daily vocal activities    maintain vigilance for vocal technique    Plan: I will see Ms. Carrington in 2 weeks to work on education, modification, and carryover of therapeutic activities to more complex phonatory tasks.  IMPRESSIONS: R49.0 (Dysphonia) and R68.89 (Chronic Throat Clearing)      TOTAL SERVICE TIME: 60 minutes  TREATMENT (84456): 60 minutes  NO CHARGE FACILITY FEE (64645)     Petrona Hoffman M.M. (voice), M.A., CCC/SLP  Speech-Language Pathologist  [facility] Voice [facility]  [phone]

## 2017-10-03 ENCOUNTER — OFFICE VISIT (OUTPATIENT)
Dept: OTOLARYNGOLOGY | Facility: CLINIC | Age: 52
End: 2017-10-03

## 2017-10-03 VITALS — HEIGHT: 68 IN | WEIGHT: 157 LBS | BODY MASS INDEX: 23.79 KG/M2

## 2017-10-03 DIAGNOSIS — H90.71 MIXED CONDUCTIVE AND SENSORINEURAL HEARING LOSS OF RIGHT EAR, UNSPECIFIED HEARING STATUS ON CONTRALATERAL SIDE: Primary | ICD-10-CM

## 2017-10-03 ASSESSMENT — PAIN SCALES - GENERAL: PAINLEVEL: NO PAIN (0)

## 2017-10-03 NOTE — LETTER
10/3/2017       RE: Anna Carrington  [address] [address]     Dear Colleague,    Thank you for referring your patient, Anna Carrington, to the [facility] EAR NOSE AND TH[facility]AT at [facility]. Please see a copy of my visit note below.    October 3, 2017         Rick Nissen, MD   [facility] [address]      Dear Dr. Nissen:      I had the pleasure of seeing Ms. Anna Carrington today in consultation at your request for evaluation and discussion of management strategies for right mixed hearing loss.      HISTORY OF PRESENT ILLNESS:  Ms. Carrington is a 52-year-old woman who has a history of progressive right mixed hearing loss.  She has been aware of it for a number of years, but it became particularly noticeable to her around 2012 when she sought evaluation here in the Otolaryngology Clinic with Dr. Echeverria.  At that time, she had a right mild to moderate mixed hearing loss with 40 dB speech reception threshold and 100% word recognition score.  Over the ensuing five years, she has noted progressive change with worsening of hearing in the right ear.  She is finding it is more difficult to hear in crowded environments and in various social functions.  She has tinnitus that is more prominent on the right but may be present bilaterally.  It is a high-pitched tone.  She also occasionally hears her heartbeat in the right ear, although this is not a prominent symptom.  Her voice to her sounds like she has a cold all the time.  She does not have a history of dizziness or vertigo or history of facial paralysis or other neurologic symptoms that are relatable specifically to the right ear or vestibulocochlear bundle.      Her family history is notable for her mom having had an asymmetric hearing loss.  Her mom had a surgery at the [facility] 15 to 20 years ago.  She did not notice any improvement in her hearing after the surgery.  It was not worse as far as she knows but also did not  get better.  This family history has led to suspicion that this is otosclerosis.      PAST MEDICAL HISTORY:  Reviewed.  She has a history of CNS vasculitis.  This was noted after an MRI scan performed for the asymmetric hearing loss revealed multiple lesions, and she has been evaluated by Dr. Reyes with a plan in place.  She has a history of dysphonia for which she sees Dr. Garsia and  cervicalgia.      MEDICATIONS:  Claritin, vitamin D, omega 3 fatty acids, calcium.          Allergies   Allergen Reactions     Hay Fever & [A.R.M.] Itching     Minocycline Other (See Comments)     Vasculitis in the brain.     Tetracycline Other (See Comments)     Lesions on the brain, right sided paralysis      Patient Supplied Answers to Review of Systems   ENT ROS 7/6/2017   Ears, Nose, Throat Ringing/noise in ears, Hoarseness   Cardiopulmonary Cough   Allergy/Immunology -   Skin -   REVIEW OF SYSTEMS:  Unchanged.      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      FAMILY HISTORY:  Reviewed above.      PHYSICAL EXAMINATION:  She appeared well.  She has full facial nerve activation bilaterally, full extraocular motility, normal facial sensation, facial motion, tongue protrusion, palatal elevation.  The external auditory canals are lined with healthy skin, and tympanic membranes are intact.  On the left, there is a blush over the promontory consistent with a Schwartze sign.  On the right, this is less prominent.  The right ear canal is amenable to a transcanal procedure.  The 512-Hz tuning fork lateralizes to the right.  Bone conduction, however, is equivocal with air conduction.  It was not distinctly louder on multiple trials.  Air conduction was louder than bone conduction on the left.      AUDIOGRAM:  Audiogram from June 1, 2017 was reviewed and compared to an audiogram from 2012.  In June of 2017, she  had left normal hearing sloping to mild to moderate sensorineural loss with 20 dB speech reception threshold and 96% word recognition score.  This is stable from April 16, 2012.  The right ear has a flat severe mixed loss with 75 dB speech reception threshold and 100% word recognition score.  This is markedly worse than the testing in 2012 with worsening of more than 30 dB at most frequencies.  The bone line is markedly asymmetric with the left ear.  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  Tympanograms are type A bilaterally.  Acoustic reflexes are absent with right stimulation and with left contralateral reflex but present with the left ipsilateral reflex.      IMPRESSION AND PLAN:  Ms. Carrington is a 52-year-old woman with a progressive right mixed hearing loss also with a family history of hearing loss that was deemed suitable for surgical intervention (although her mother did not experience hearing improvement with this surgery).  The highest item in the different diagnosis is otosclerosis.  There may be retrofenestral involvement.      We discussed otosclerosis as well as other items in the differential diagnosis.  Management strategies include fitting of amplification, and we discussed that there is a 45 day trial period available in [place] to all patients.  We also discussed middle ear exploration and the means by which we confirm otosclerosis is present as well as steps of laser assisted stapedectomy.  I discussed the common and serious risks of the procedure in detail including the potential risk of complete sensorineural hearing loss, taste change, dizziness, tinnitus, facial paralysis, failure to achieve desired result, tympanic membrane injury, infection, bleeding, loss of balance, and others as discussed.      My recommendation is that she undergo temporal bone CT imaging.  This is specifically because she has had significant progression, has a significant mixed and a sensorineural component  potentially, even though part of it is likely Carhart phenomenon, and because of this history of no improvement in hearing from her mother's surgery.  I have explained that we cannot improve the hearing more than the sensorineural function will allow and that closure of the air-bone gap may still result in sensorineural loss that will require amplification.      We agreed that we will obtain a temporal bone CT scan.  We will review the results to assist her in deciding how she would like to proceed.      Thank you for allowing me to participate in her care.      Sincerely,      Anne Fang M.D.    Neurotology    [phone]      Thirty minutes were spent with the patient, greater than half of which was counseling and coordinating care.       Anne Fang MD

## 2017-10-03 NOTE — PATIENT INSTRUCTIONS
1. Please complete a CT scan toady, we will call you with results.    2. Please call the ENT clinic with any questions,concerns, new or worsening symptoms.    -Clinic number is [phone]   - Elsi's direct line (Dr. Fang' nurse) [phone]

## 2017-10-03 NOTE — MR AVS SNAPSHOT
After Visit Summary   10/3/2017    Anna Carrington    MRN: [id number]           Patient Information     Date Of Birth          1965        Visit Information        Provider Department      10/3/2017 8:40 AM Anne Fang MD [facility] Ear Nose and Throat        Today's Diagnoses     Mixed conductive and sensorineural hearing loss of right ear, unspecified hearing status on contralateral side    -  1      Care Instructions    1. Please complete a CT scan toady, we will call you with results.    2. Please call the ENT clinic with any questions,concerns, new or worsening symptoms.    -Clinic number is [phone]   - Elsi's direct line (Dr. Fang' nurse) [phone]              Follow-ups after your visit        Who to contact     Please call your clinic at [phone] to:    Ask questions about your health    Make or cancel appointments    Discuss your medicines    Learn about your test results    Speak to your doctor   If you have compliments or concerns about an experience at your clinic, or if you wish to file a complaint, please contact [facility] Patient Relations at [phone] or email us at [email or web address]         Additional Information About Your Visit        MyChart Information     [employer] gives you secure access to your electronic health record. If you see a primary care provider, you can also send messages to your care team and make appointments. If you have questions, please call your primary care clinic.  If you do not have a primary care provider, please call [phone] and they will assist you.      [employer] is an electronic gateway that provides easy, online access to your medical records. With [employer], you can request a clinic appointment, read your test results, renew a prescription or communicate with your care team.     To access your existing account, please contact your [facility] or  "call [phone] for assistance.        Care EveryWhere ID     This is your Care EveryWhere ID. This could be used by other organizations to access your [place] medical records  [id number]        Your Vitals Were     Height BMI (Body Mass Index)                1.727 m (5' 7.99\") 23.88 kg/m2           Blood Pressure from Last 3 Encounters:   11/21/12 123/84   08/15/12 122/72    Weight from Last 3 Encounters:   10/03/17 71.2 kg (157 lb)   07/18/17 71.2 kg (157 lb)   07/07/17 71.7 kg (158 lb)                 Today's Medication Changes          These changes are accurate as of: 10/3/17 11:59 PM.  If you have any questions, ask your nurse or doctor.               Stop taking these medicines if you haven't already. Please contact your care team if you have questions.     fluticasone 50 MCG/ACT spray   Commonly known as:  FLONASE   Stopped by:  Anne Fang MD                    Primary Care Provider Office Phone # Fax #    Jeanette Luna [phone]       [place] [address]        Equal Access to Services     [facility] AH: Hadii cassia dominguezo Sospenser, waaxda luqadaha, qaybta kaalmada ademichelleyada, rahel brennan. So [facility] [phone].    ATENCIÓN: Si habla español, tiene a loyola disposición servicios gratuitos de asistencia lingüística. Sohail al [phone].    We comply with applicable federal civil rights laws and [place] laws. We do not discriminate on the basis of race, color, national origin, age, disability, sex, sexual orientation, or gender identity.            Thank you!     Thank you for choosing [facility] EAR NOSE AND THROAT  for your care. Our goal is always to provide you with excellent care. Hearing back from our patients is one way we can continue to improve our services. Please take a few minutes to complete the written survey that you may receive in the mail after your visit with us. Thank you!           "   Your Updated Medication List - Protect others around you: Learn how to safely use, store and throw away your medicines at www.disposemymeds.org.          This list is accurate as of: 10/3/17 11:59 PM.  Always use your most recent med list.                   Brand Name Dispense Instructions for use Diagnosis    CALCIUM 500+D PO      Take 1 tablet by mouth 2 times daily.        CLARITIN 10 MG capsule   Generic drug:  loratadine      Take 10 mg by mouth daily        DAILY VITAMIN PO      Take 1 tablet by mouth daily.        OMEGA-3 FISH OIL PO

## 2017-10-03 NOTE — PROGRESS NOTES
October 3, 2017         Rick Nissen, MD   [place] [address]      Dear Dr. Nissen:      I had the pleasure of seeing Ms. Anna Carrington today in consultation at your request for evaluation and discussion of management strategies for right mixed hearing loss.      HISTORY OF PRESENT ILLNESS:  Ms. Carrington is a 52-year-old woman who has a history of progressive right mixed hearing loss.  She has been aware of it for a number of years, but it became particularly noticeable to her around 2012 when she sought evaluation here in the Otolaryngology Clinic with Dr. Echeverria.  At that time, she had a right mild to moderate mixed hearing loss with 40 dB speech reception threshold and 100% word recognition score.  Over the ensuing five years, she has noted progressive change with worsening of hearing in the right ear.  She is finding it is more difficult to hear in crowded environments and in various social functions.  She has tinnitus that is more prominent on the right but may be present bilaterally.  It is a high-pitched tone.  She also occasionally hears her heartbeat in the right ear, although this is not a prominent symptom.  Her voice to her sounds like she has a cold all the time.  She does not have a history of dizziness or vertigo or history of facial paralysis or other neurologic symptoms that are relatable specifically to the right ear or vestibulocochlear bundle.      Her family history is notable for her mom having had an asymmetric hearing loss.  Her mom had a surgery at the [facility] 15 to 20 years ago.  She did not notice any improvement in her hearing after the surgery.  It was not worse as far as she knows but also did not get better.  This family history has led to suspicion that this is otosclerosis.      PAST MEDICAL HISTORY:  Reviewed.  She has a history of CNS vasculitis.  This was noted after an MRI scan performed for the asymmetric hearing loss revealed multiple lesions, and she has been evaluated by   Eric with a plan in place.  She has a history of dysphonia for which she sees Dr. Garsia and  cervicalgia.      MEDICATIONS:  Claritin, vitamin D, omega 3 fatty acids, calcium.          Allergies   Allergen Reactions     Hay Fever & [A.R.M.] Itching     Minocycline Other (See Comments)     Vasculitis in the brain.     Tetracycline Other (See Comments)     Lesions on the brain, right sided paralysis      Patient Supplied Answers to Review of Systems   ENT ROS 7/6/2017   Ears, Nose, Throat Ringing/noise in ears, Hoarseness   Cardiopulmonary Cough   Allergy/Immunology -   Skin -   REVIEW OF SYSTEMS:  Unchanged.      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      FAMILY HISTORY:  Reviewed above.      PHYSICAL EXAMINATION:  She appeared well.  She has full facial nerve activation bilaterally, full extraocular motility, normal facial sensation, facial motion, tongue protrusion, palatal elevation.  The external auditory canals are lined with healthy skin, and tympanic membranes are intact.  On the left, there is a blush over the promontory consistent with a Schwartze sign.  On the right, this is less prominent.  The right ear canal is amenable to a transcanal procedure.  The 512-Hz tuning fork lateralizes to the right.  Bone conduction, however, is equivocal with air conduction.  It was not distinctly louder on multiple trials.  Air conduction was louder than bone conduction on the left.      AUDIOGRAM:  Audiogram from June 1, 2017 was reviewed and compared to an audiogram from 2012.  In June of 2017, she had left normal hearing sloping to mild to moderate sensorineural loss with 20 dB speech reception threshold and 96% word recognition score.  This is stable from April 16, 2012.  The right ear has a flat severe mixed loss with 75 dB speech reception threshold and 100% word recognition score.   This is markedly worse than the testing in 2012 with worsening of more than 30 dB at most frequencies.  The bone line is markedly asymmetric with the left ear.  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  Tympanograms are type A bilaterally.  Acoustic reflexes are absent with right stimulation and with left contralateral reflex but present with the left ipsilateral reflex.      IMPRESSION AND PLAN:  Ms. Carrington is a 52-year-old woman with a progressive right mixed hearing loss also with a family history of hearing loss that was deemed suitable for surgical intervention (although her mother did not experience hearing improvement with this surgery).  The highest item in the different diagnosis is otosclerosis.  There may be retrofenestral involvement.      We discussed otosclerosis as well as other items in the differential diagnosis.  Management strategies include fitting of amplification, and we discussed that there is a 45 day trial period available in [place] to all patients.  We also discussed middle ear exploration and the means by which we confirm otosclerosis is present as well as steps of laser assisted stapedectomy.  I discussed the common and serious risks of the procedure in detail including the potential risk of complete sensorineural hearing loss, taste change, dizziness, tinnitus, facial paralysis, failure to achieve desired result, tympanic membrane injury, infection, bleeding, loss of balance, and others as discussed.      My recommendation is that she undergo temporal bone CT imaging.  This is specifically because she has had significant progression, has a significant mixed and a sensorineural component potentially, even though part of it is likely Carhart phenomenon, and because of this history of no improvement in hearing from her mother's surgery.  I have explained that we cannot improve the hearing more than the sensorineural function will allow and that closure of the air-bone gap may still  result in sensorineural loss that will require amplification.      We agreed that we will obtain a temporal bone CT scan.  We will review the results to assist her in deciding how she would like to proceed.      Thank you for allowing me to participate in her care.      Sincerely,      Anne Fang M.D.    Neurotology    [phone]      Thirty minutes were spent with the patient, greater than half of which was counseling and coordinating care.

## 2017-10-03 NOTE — NURSING NOTE
Chief Complaint   Patient presents with     Consult     consult for surgery     [facility]

## 2017-10-06 ENCOUNTER — TELEPHONE (OUTPATIENT)
Dept: OTOLARYNGOLOGY | Facility: CLINIC | Age: 52
End: 2017-10-06

## 2017-10-06 NOTE — TELEPHONE ENCOUNTER
Called patient with the following CT results per Dr. Fang:  Please call with results.       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  There does not appear to be disease around the cochlea, which is good.     She may wish to consider stapes surgery, as we discussed, or may consider a trial of a hearing aid at her discretion.  I would be happy to discuss further if she has additional questions.     Thank you   Anne Fang      Patient would like to continue to think about how she would like to proceed. She was given writer's direct line and encouraged to call when she has decided if she would like to proceed with surgery or trial of a hearing aid. Patient verbalized understanding and will call with further questions or concerns.     Elsi Peña, RN, BSN

## 2017-10-11 ENCOUNTER — OFFICE VISIT (OUTPATIENT)
Dept: OTOLARYNGOLOGY | Facility: CLINIC | Age: 52
End: 2017-10-11

## 2017-10-11 DIAGNOSIS — R09.89 CHRONIC THROAT CLEARING: ICD-10-CM

## 2017-10-11 DIAGNOSIS — R49.0 DYSPHONIA: Primary | ICD-10-CM

## 2017-10-11 NOTE — MR AVS SNAPSHOT
After Visit Summary   10/11/2017    Anna Carrington    MRN: [id number]           Patient Information     Date Of Birth          1965        Visit Information        Provider Department      10/11/2017 8:00 AM Petrona Hoffman, SLP [facility]        Today's Diagnoses     Dysphonia    -  1    Chronic throat clearing           Follow-ups after your visit        Who to contact     Please call your clinic at [phone] to:    Ask questions about your health    Make or cancel appointments    Discuss your medicines    Learn about your test results    Speak to your doctor   If you have compliments or concerns about an experience at your clinic, or if you wish to file a complaint, please contact [facility] Patient Relations at [phone] or email us at [email or web address]         Additional Information About Your Visit        MyChart Information     [employer] gives you secure access to your electronic health record. If you see a primary care provider, you can also send messages to your care team and make appointments. If you have questions, please call your primary care clinic.  If you do not have a primary care provider, please call [phone] and they will assist you.      [employer] is an electronic gateway that provides easy, online access to your medical records. With [employer], you can request a clinic appointment, read your test results, renew a prescription or communicate with your care team.     To access your existing account, please contact your [facility] or call [phone] for assistance.        Care EveryWhere ID     This is your Care EveryWhere ID. This could be used by other organizations to access your [place] medical records  [id number]         Blood Pressure from Last 3 Encounters:   11/21/12 123/84   08/15/12 122/72    Weight from Last 3 Encounters:   10/03/17 71.2 kg (157 lb)   07/18/17 71.2 kg (157 lb)    07/07/17 71.7 kg (158 lb)              We Performed the Following     SPEECH/HEARING THERAPY, INDIVIDUAL        Primary Care Provider Office Phone # Fax #    Jeanette Luna [phone]       [address] [address]        Equal Access to Services     BRADY SALDAÑA : Hadii cassia ku hadasho Soomaali, waaxda luqadaha, qaybta kaalmada adeegyada, waxay cyin hayalonzo mendezgilbertangelica brennan. So [facility] [phone].    ATENCIÓN: Si habla español, tiene a loyola disposición servicios gratuitos de asistencia lingüística. Sohail al [phone].    We comply with applicable federal civil rights laws and [place] laws. We do not discriminate on the basis of race, color, national origin, age, disability, sex, sexual orientation, or gender identity.            Thank you!     Thank you for choosing [facility]  for your care. Our goal is always to provide you with excellent care. Hearing back from our patients is one way we can continue to improve our services. Please take a few minutes to complete the written survey that you may receive in the mail after your visit with us. Thank you!             Your Updated Medication List - Protect others around you: Learn how to safely use, store and throw away your medicines at www.disposemymeds.org.          This list is accurate as of: 10/11/17 11:59 PM.  Always use your most recent med list.                   Brand Name Dispense Instructions for use Diagnosis    CALCIUM 500+D PO      Take 1 tablet by mouth 2 times daily.        CLARITIN 10 MG capsule   Generic drug:  loratadine      Take 10 mg by mouth daily        DAILY VITAMIN PO      Take 1 tablet by mouth daily.        OMEGA-3 FISH OIL PO

## 2017-10-11 NOTE — PROGRESS NOTES
[facility]  Shreyas Holder Jr., M.D., F.A.C.S.  Alisa Garsia M.D., M.P.H.  Kimberly Laguna, Ph.D., CCC/SLP  Petrona Hoffman M.M. (voice), M.A., CCC/SLP  Ady Cedeno M.M. (voice), M.A., [facility]/SLP    [facility]  VOICE/SPEECH/BREATHING THERAPY PROGRESS REPORT    Patient: Anna Carrington  Date of Service: 10/11/2017    PROGRESS SINCE LAST SESSION  Ms. Carrington was last seen on 9/27/17. At that time, she worked on learning therapeutic activities to improve her voice quality and comfort, secondary to dysphonia and chronic throat clearing; allowing  her to meet personal and professional vocal demands and fully engage in activities of daily living.    Regarding practice, Ms. Carrington reports the following:     Daily practice with and without the recording    the recording is not helpful    The after visit summary is helpful to facilitate practice.    voice is improved during the exercises; improving carryover to spontaneous conversation    she experiences improvement in fatigue and discomfort while doing the exercises    Ms. Carrington also states that:    Going ok, but still feeling congestion, feeling on the brink of a cold virus, but makes her voice raspy.    Mosquito bit analogy is helpful.     Ms. Carrington presents today with the following:  VOICE:    Roughness: Mild Intermittent    Breathiness: Mild to moderate Consistent    Strain: WNL     Mild back focus    Loudness    Conversational speech:  Mild to moderately reduced;Tends to speak with a softer voice.    Habitual pitch is around G3    THERAPEUTIC ACTIVITIES  Today Ms. Carrington participated in the following therapeutic activities:    [place] additional concepts and techniques for improving topical and systemic hydration     I provided instruction for techniques and strategies to reduce the chronic cough/throat clearing  o alternative behaviors such as voiceless glottic coup, humming, swallowing, etc. were taught  o strategies for  "reducing mucosal irritation were taught    Exercises to add phonation to the optimal flowing airstream.  o Semi-occluded vocal tract exercises with a \"hum\", tongue trill, or straw phonation with water resistance were most facilitating  o Instruction with arpeggio patterns was helpful  o Instructed to use as a voice warm up, cool down, coordination of breath flow with phonation, and for tissue mobilization.  o good learning, but will need practice     [place] exercises to experience a more forward sensation during phonation.  o speech material with nasal continuants was facilitating  o speech material that elicits a high, forward tongue position was facilitating  o able to recognize improvement in quality and comfort  o able to progress from \"m\" + vowel combinations, to the word and phrase level  o good learning, but will need practice    [place] exercises to maintain the improved airflow and forward focus in singing.  o did a variety of glides, scales, and arpeggio patterns on a variety of neutral syllables  o Instructed with arpeggio patterns that included bilabial plosives and easy onsets to obtain a forward focus and optimal connection between breath flow and speech.  o learned how to apply the concepts to repertoire    Developed a checklist of factors.    [place] concepts of post-operative voice use and care, to optimize healing.    [place] exercises to improve her perceived loudness in noisy situations, without placing more burden on the larynx.    [place] a regimen for optimal warm-up and cool-down.    Recorded a pitch glide exercise to monitor pitch range on a daily basis.    [place] concepts of an optimal regimen for practice.  o she should use an interval schedule of practice, with brief periods of practice frequently throughout each day  o [place] concepts of volitional practice to facilitate motor learning.    I provided an after visit summary to help facilitate practice.    An audio recording of today's " therapeutic activities was provided, to facilitate practice.    IMPRESSIONS/GOALS/PLAN  Ms. Carrington had a productive session of therapy today, working on techniques/strategies/exercises that will help her achieve her goal of acceptable and comfortable voice use, secondary to dysphonia and chronic throat clearing; allowing her to meet personal and professional vocal demands and fully engage in activities of daily living.     Progress toward long-term goals:  Adequate progress; too early for objective measures     Goals for this practice period:     practice all exercises according to instructions    incorporate techniques into daily vocal activities    maintain vigilance for vocal technique     Plan: I will see Ms. Carrington in 2 weeks to work on education, modification, and carryover of therapeutic activities to more complex phonatory tasks.  IMPRESSIONS: R49.0 (Dysphonia) and R68.89 (Chronic Throat Clearing)       TOTAL SERVICE TIME: 60 minutes  TREATMENT (97790): 60 minutes  NO CHARGE FACILITY FEE (27506)      Petrona Hoffman M.M. (voice) MSusannaA., CCC/SLP  Speech-Language Pathologist  [facility]  [phone]

## 2017-10-11 NOTE — LETTER
10/11/2017       RE: Anna Carrington  [address] [facility] [address]     Dear Colleague,    Thank you for referring your patient, Anna Carrington, to the [facility] at [facility]. Please see a copy of my visit note below.    [facility]  Shreyas Holder Jr., M.D., F.A.C.S.  Alisa Garsia M.D., M.P.H.  Kimberly Laguna, Ph.D., CCC/SLP  Petrona Hoffman M.M. (voice), M.A., CCC/SLP  Ady Cedeno M.M. (voice), M.A., [facility]/SLP    [facility]  VOICE/SPEECH/BREATHING THERAPY PROGRESS REPORT    Patient: Anna Carrington  Date of Service: 10/11/2017    PROGRESS SINCE LAST SESSION  Ms. Carrington was last seen on 9/27/17. At that time, she worked on learning therapeutic activities to improve her voice quality and comfort, secondary to dysphonia and chronic throat clearing; allowing  her to meet personal and professional vocal demands and fully engage in activities of daily living.    Regarding practice, Ms. Carrington reports the following:     Daily practice with and without the recording    the recording is not helpful    The after visit summary is helpful to facilitate practice.    voice is improved during the exercises; improving carryover to spontaneous conversation    she experiences improvement in fatigue and discomfort while doing the exercises    Ms. Carrington also states that:    Going ok, but still feeling congestion, feeling on the brink of a cold virus, but makes her voice raspy.    Mosquito bit analogy is helpful.     Ms. Carrington presents today with the following:  VOICE:    Roughness: Mild Intermittent    Breathiness: Mild to moderate Consistent    Strain: WNL     Mild back focus    Loudness    Conversational speech:  Mild to moderately reduced;Tends to speak with a softer voice.    Habitual pitch is around G3    THERAPEUTIC ACTIVITIES  Today Ms. Carrington participated in the following therapeutic activities:    [place] additional  "concepts and techniques for improving topical and systemic hydration     I provided instruction for techniques and strategies to reduce the chronic cough/throat clearing  o alternative behaviors such as voiceless glottic coup, humming, swallowing, etc. were taught  o strategies for reducing mucosal irritation were taught    Exercises to add phonation to the optimal flowing airstream.  o Semi-occluded vocal tract exercises with a \"hum\", tongue trill, or straw phonation with water resistance were most facilitating  o Instruction with arpeggio patterns was helpful  o Instructed to use as a voice warm up, cool down, coordination of breath flow with phonation, and for tissue mobilization.  o good learning, but will need practice     [place] exercises to experience a more forward sensation during phonation.  o speech material with nasal continuants was facilitating  o speech material that elicits a high, forward tongue position was facilitating  o able to recognize improvement in quality and comfort  o able to progress from \"m\" + vowel combinations, to the word and phrase level  o good learning, but will need practice    [place] exercises to maintain the improved airflow and forward focus in singing.  o did a variety of glides, scales, and arpeggio patterns on a variety of neutral syllables  o Instructed with arpeggio patterns that included bilabial plosives and easy onsets to obtain a forward focus and optimal connection between breath flow and speech.  o learned how to apply the concepts to repertoire    Developed a checklist of factors.    [place] concepts of post-operative voice use and care, to optimize healing.    [place] exercises to improve her perceived loudness in noisy situations, without placing more burden on the larynx.    [place] a regimen for optimal warm-up and cool-down.    Recorded a pitch glide exercise to monitor pitch range on a daily basis.    [place] concepts of an optimal regimen for " practice.  o she should use an interval schedule of practice, with brief periods of practice frequently throughout each day  o [place] concepts of volitional practice to facilitate motor learning.    I provided an after visit summary to help facilitate practice.    An audio recording of today's therapeutic activities was provided, to facilitate practice.    IMPRESSIONS/GOALS/PLAN  Ms. Carrington had a productive session of therapy today, working on techniques/strategies/exercises that will help her achieve her goal of acceptable and comfortable voice use, secondary to dysphonia and chronic throat clearing; allowing her to meet personal and professional vocal demands and fully engage in activities of daily living.     Progress toward long-term goals:  Adequate progress; too early for objective measures     Goals for this practice period:     practice all exercises according to instructions    incorporate techniques into daily vocal activities    maintain vigilance for vocal technique     Plan: I will see Ms. Carrington in 2 weeks to work on education, modification, and carryover of therapeutic activities to more complex phonatory tasks.  IMPRESSIONS: R49.0 (Dysphonia) and R68.89 (Chronic Throat Clearing)       TOTAL SERVICE TIME: 60 minutes  TREATMENT (54367): 60 minutes  NO CHARGE FACILITY FEE (34896)      Petrona Hoffman M.M. (voice), M.A., CCC/SLP  Speech-Language Pathologist  [facility] Voice [facility]  [phone]      After Visit Summary    Patient: Anna Carrington  Date of Visit: 10/11/2017    Hygiene:     Systemic Hydration: internal hydration of the entire body  o 8 oz of water first thing after peeing.  Then, ok to move on to caffeine.      Topical Hydration: hydration for the surface mucosa of the larynx and vocal folds.  o Consider a humidifier       Nasal Irrigation/Nasal Spray  o Continue the saline spray morning, during the day (2x) and at night    Gargling  o 2x per day with saline (try also to complete after  meals with plain water)      Throat clearing:       Increased awareness - wait and hold off, swallow    Sip of water     Gargle  o With a voice  o Tilt your head side to side  o [place] chirp -  bin quinonezaa     Dry hard swallow      Voice:      Hummmm/ tongue trill/ Bubbles (straw in 1.5 to 2  of water) 4x/day 1-2 min:    blow 10-15 seconds with no voice and keep bubbles consistent.    3x: blow bubbles and add a sustained  who  or an  oo  (G3)    3x: blow bubbles and vary  who  gliding up and down  (1-5-1; today went up and down by 1/2 steps just a little bit)             Up and down like a sine wave    3x: blow bubbles on a sustained/ varied pitch soft to loud to soft (messa di voce)  1, 3, 5, 3, 1      1-2x: Happy birthday bubbles (keep connected)  These exercises are great for:                                                    *warm up / cool down - Part of the morning routing and before and after extended voice use.                                                    *tissue mobilization exercise - Improving the condition and pliability of the vocal folds.                                                    *Abdominal breathing and applying optimal breath flow to speech/singing.        M  + vowels (5-1, or D4 to G3)      m  words  o 5 words, and monitor jaw tension and tongue placement.   Focus on finding a forward resonant sound     m  sentences  o 5 sentences - short to medium length phrases    *Continue tongue stretches    * 1-5-1 - Blah (brings sound forward and keeps jaw loose)    * 1-3-1-5-1 Yaw, yaw, yaw... Tongue placement and jaw relaxed.    Also - try to keep chin level when vocalizing, sometimes it elevates a bit     Petrona Hoffman M.M. (voice)MELVINA., CCC/SLP  Speech-Language Pathologist  [facility]  [phone]

## 2017-10-11 NOTE — PROGRESS NOTES
After Visit Summary    Patient: Anna Carrington  Date of Visit: 10/11/2017    Hygiene:     Systemic Hydration: internal hydration of the entire body  o 8 oz of water first thing after peeing.  Then, ok to move on to caffeine.      Topical Hydration: hydration for the surface mucosa of the larynx and vocal folds.  o Consider a humidifier       Nasal Irrigation/Nasal Spray  o Continue the saline spray morning, during the day (2x) and at night    Gargling  o 2x per day with saline (try also to complete after meals with plain water)      Throat clearing:       Increased awareness - wait and hold off, swallow    Sip of water     Gargle  o With a voice  o Tilt your head side to side  o [place] chirp -  kakakaaa kakakaaa     Dry hard swallow      Voice:      Hummmm/ tongue trill/ Bubbles (straw in 1.5 to 2  of water) 4x/day 1-2 min:    blow 10-15 seconds with no voice and keep bubbles consistent.    3x: blow bubbles and add a sustained  who  or an  oo  (G3)    3x: blow bubbles and vary  who  gliding up and down  (1-5-1; today went up and down by 1/2 steps just a little bit)             Up and down like a sine wave    3x: blow bubbles on a sustained/ varied pitch soft to loud to soft (messa di voce)  1, 3, 5, 3, 1      1-2x: Happy birthday bubbles (keep connected)  These exercises are great for:                                                    *warm up / cool down - Part of the morning routing and before and after extended voice use.                                                    *tissue mobilization exercise - Improving the condition and pliability of the vocal folds.                                                    *Abdominal breathing and applying optimal breath flow to speech/singing.        M  + vowels (5-1, or D4 to G3)      m  words  o 5 words, and monitor jaw tension and tongue placement.   Focus on finding a forward resonant sound     m  sentences  o 5 sentences - short to medium length  phrases    *Continue tongue stretches    * 1-5-1 - Blah (brings sound forward and keeps jaw loose)    * 1-3-1-5-1 Yaw, yaw, yaw... Tongue placement and jaw relaxed.    Also - try to keep chin level when vocalizing, sometimes it elevates a bit     Petrona Hoffman M.M. (voice), M.A., CCC/SLP  Speech-Language Pathologist  [facility]  [phone]

## 2017-11-02 ENCOUNTER — CARE COORDINATION (OUTPATIENT)
Dept: OTOLARYNGOLOGY | Facility: CLINIC | Age: 52
End: 2017-11-02

## 2017-11-02 DIAGNOSIS — H80.90 OTOSCLEROSIS: Primary | ICD-10-CM

## 2017-11-02 NOTE — PROGRESS NOTES
Patient called and after considering she would like to move forward with surgery with Dr. Fang. Surgery orders placed and patient will be called by surgery scheduler to arrange. Will complete surgery teaching once patient is scheduled over the phone.       Elsi Peña, RN, BSN

## 2017-12-19 ENCOUNTER — RADIANT APPOINTMENT (OUTPATIENT)
Dept: MAMMOGRAPHY | Facility: CLINIC | Age: 52
End: 2017-12-19
Attending: FAMILY MEDICINE
Payer: COMMERCIAL

## 2017-12-19 DIAGNOSIS — Z12.39 SCREENING BREAST EXAMINATION: ICD-10-CM

## 2017-12-28 ENCOUNTER — CARE COORDINATION (OUTPATIENT)
Dept: OTOLARYNGOLOGY | Facility: CLINIC | Age: 52
End: 2017-12-28

## 2017-12-28 NOTE — PROGRESS NOTES
Telephone Pre-op Teaching    Procedure: right diode laser stapedectomy  Planned Surgery Date: 1/17/18  Surgeon: Dr. Fang    Follow-up call to discuss pre-op instructions/routine and requirements to include:  surgical procedure, post-op recovery and expectations, need for H&P/PAC visit, NPO prior to OR, pre-op antibacterial showers, pain control and importance of follow-up visits.  Surgery scheduling will coordinate OR time/date and update patient as appropriate.  3C will call with more instructions 24-48 hour pre-op.  Ample time was provided for patient questions and in-depth discussion of topics of heightened interest.  Reviewed medication list and provided instructions regarding what medications to stop prior to surgery. Patient verbalized understanding of instructions.  Approximately 15 minutes spent on the telephone with patient discussing and reviewing specific instructions.      Patient was provided contact number for questions or concerns that may arise prior to surgery.        Elsi Peña, RN, BSN

## 2018-01-16 ENCOUNTER — ANESTHESIA EVENT (OUTPATIENT)
Dept: SURGERY | Facility: AMBULATORY SURGERY CENTER | Age: 53
End: 2018-01-16

## 2018-01-17 ENCOUNTER — ANESTHESIA (OUTPATIENT)
Dept: SURGERY | Facility: AMBULATORY SURGERY CENTER | Age: 53
End: 2018-01-17

## 2018-01-17 ENCOUNTER — HOSPITAL ENCOUNTER (OUTPATIENT)
Facility: AMBULATORY SURGERY CENTER | Age: 53
End: 2018-01-17
Attending: OTOLARYNGOLOGY
Payer: COMMERCIAL

## 2018-01-17 VITALS
RESPIRATION RATE: 15 BRPM | SYSTOLIC BLOOD PRESSURE: 107 MMHG | BODY MASS INDEX: 24.25 KG/M2 | TEMPERATURE: 97.8 F | HEART RATE: 89 BPM | HEIGHT: 68 IN | WEIGHT: 160 LBS | OXYGEN SATURATION: 96 % | DIASTOLIC BLOOD PRESSURE: 64 MMHG

## 2018-01-17 DIAGNOSIS — H80.91 OTOSCLEROSIS OF RIGHT EAR: Primary | ICD-10-CM

## 2018-01-17 DEVICE — EAR IMP STAPLES CLASSIC BUCKET 0.4X4.0X1.0MM TI: Type: IMPLANTABLE DEVICE | Site: EAR | Status: FUNCTIONAL

## 2018-01-17 RX ORDER — DEXAMETHASONE SODIUM PHOSPHATE 4 MG/ML
INJECTION, SOLUTION INTRA-ARTICULAR; INTRALESIONAL; INTRAMUSCULAR; INTRAVENOUS; SOFT TISSUE PRN
Status: DISCONTINUED | OUTPATIENT
Start: 2018-01-17 | End: 2018-01-17

## 2018-01-17 RX ORDER — ONDANSETRON 2 MG/ML
4 INJECTION INTRAMUSCULAR; INTRAVENOUS EVERY 30 MIN PRN
Status: DISCONTINUED | OUTPATIENT
Start: 2018-01-17 | End: 2018-01-18 | Stop reason: HOSPADM

## 2018-01-17 RX ORDER — GLYCOPYRROLATE 0.2 MG/ML
INJECTION, SOLUTION INTRAMUSCULAR; INTRAVENOUS PRN
Status: DISCONTINUED | OUTPATIENT
Start: 2018-01-17 | End: 2018-01-17

## 2018-01-17 RX ORDER — EPINEPHRINE NASAL SOLUTION 1 MG/ML
SOLUTION NASAL PRN
Status: DISCONTINUED | OUTPATIENT
Start: 2018-01-17 | End: 2018-01-17 | Stop reason: HOSPADM

## 2018-01-17 RX ORDER — SODIUM CHLORIDE, SODIUM LACTATE, POTASSIUM CHLORIDE, CALCIUM CHLORIDE 600; 310; 30; 20 MG/100ML; MG/100ML; MG/100ML; MG/100ML
INJECTION, SOLUTION INTRAVENOUS CONTINUOUS
Status: DISCONTINUED | OUTPATIENT
Start: 2018-01-17 | End: 2018-01-18 | Stop reason: HOSPADM

## 2018-01-17 RX ORDER — GABAPENTIN 300 MG/1
300 CAPSULE ORAL ONCE
Status: COMPLETED | OUTPATIENT
Start: 2018-01-17 | End: 2018-01-17

## 2018-01-17 RX ORDER — LIDOCAINE 40 MG/G
CREAM TOPICAL
Status: DISCONTINUED | OUTPATIENT
Start: 2018-01-17 | End: 2018-01-17 | Stop reason: HOSPADM

## 2018-01-17 RX ORDER — PROPOFOL 10 MG/ML
INJECTION, EMULSION INTRAVENOUS PRN
Status: DISCONTINUED | OUTPATIENT
Start: 2018-01-17 | End: 2018-01-17

## 2018-01-17 RX ORDER — BACITRACIN 500 [USP'U]/G
OINTMENT OPHTHALMIC PRN
Status: DISCONTINUED | OUTPATIENT
Start: 2018-01-17 | End: 2018-01-17 | Stop reason: HOSPADM

## 2018-01-17 RX ORDER — LIDOCAINE HYDROCHLORIDE AND EPINEPHRINE 10; 10 MG/ML; UG/ML
INJECTION, SOLUTION INFILTRATION; PERINEURAL PRN
Status: DISCONTINUED | OUTPATIENT
Start: 2018-01-17 | End: 2018-01-17 | Stop reason: HOSPADM

## 2018-01-17 RX ORDER — PREDNISONE 20 MG/1
TABLET ORAL
Qty: 20 TABLET | Refills: 0 | Status: SHIPPED | OUTPATIENT
Start: 2018-01-17 | End: 2018-03-20

## 2018-01-17 RX ORDER — OFLOXACIN 3 MG/ML
SOLUTION/ DROPS OPHTHALMIC PRN
Status: DISCONTINUED | OUTPATIENT
Start: 2018-01-17 | End: 2018-01-17 | Stop reason: HOSPADM

## 2018-01-17 RX ORDER — LIDOCAINE HYDROCHLORIDE 10 MG/ML
INJECTION, SOLUTION INFILTRATION; PERINEURAL PRN
Status: DISCONTINUED | OUTPATIENT
Start: 2018-01-17 | End: 2018-01-17

## 2018-01-17 RX ORDER — FENTANYL CITRATE 50 UG/ML
INJECTION, SOLUTION INTRAMUSCULAR; INTRAVENOUS PRN
Status: DISCONTINUED | OUTPATIENT
Start: 2018-01-17 | End: 2018-01-17

## 2018-01-17 RX ORDER — ONDANSETRON 2 MG/ML
INJECTION INTRAMUSCULAR; INTRAVENOUS PRN
Status: DISCONTINUED | OUTPATIENT
Start: 2018-01-17 | End: 2018-01-17

## 2018-01-17 RX ORDER — PROPOFOL 10 MG/ML
INJECTION, EMULSION INTRAVENOUS CONTINUOUS PRN
Status: DISCONTINUED | OUTPATIENT
Start: 2018-01-17 | End: 2018-01-17

## 2018-01-17 RX ORDER — EPHEDRINE SULFATE 50 MG/ML
INJECTION, SOLUTION INTRAMUSCULAR; INTRAVENOUS; SUBCUTANEOUS PRN
Status: DISCONTINUED | OUTPATIENT
Start: 2018-01-17 | End: 2018-01-17

## 2018-01-17 RX ORDER — ONDANSETRON 4 MG/1
4 TABLET, ORALLY DISINTEGRATING ORAL EVERY 30 MIN PRN
Status: DISCONTINUED | OUTPATIENT
Start: 2018-01-17 | End: 2018-01-18 | Stop reason: HOSPADM

## 2018-01-17 RX ORDER — ACETAMINOPHEN 325 MG/1
975 TABLET ORAL ONCE
Status: COMPLETED | OUTPATIENT
Start: 2018-01-17 | End: 2018-01-17

## 2018-01-17 RX ORDER — MEPERIDINE HYDROCHLORIDE 25 MG/ML
12.5 INJECTION INTRAMUSCULAR; INTRAVENOUS; SUBCUTANEOUS
Status: DISCONTINUED | OUTPATIENT
Start: 2018-01-17 | End: 2018-01-18 | Stop reason: HOSPADM

## 2018-01-17 RX ORDER — CEFUROXIME AXETIL 250 MG/1
250 TABLET ORAL 2 TIMES DAILY
Qty: 14 TABLET | Refills: 0 | Status: SHIPPED | OUTPATIENT
Start: 2018-01-17 | End: 2018-01-24

## 2018-01-17 RX ORDER — HYDROCODONE BITARTRATE AND ACETAMINOPHEN 5; 325 MG/1; MG/1
1-2 TABLET ORAL ONCE
Status: COMPLETED | OUTPATIENT
Start: 2018-01-17 | End: 2018-01-17

## 2018-01-17 RX ORDER — NALOXONE HYDROCHLORIDE 0.4 MG/ML
.1-.4 INJECTION, SOLUTION INTRAMUSCULAR; INTRAVENOUS; SUBCUTANEOUS
Status: DISCONTINUED | OUTPATIENT
Start: 2018-01-17 | End: 2018-01-18 | Stop reason: HOSPADM

## 2018-01-17 RX ORDER — ALBUTEROL SULFATE 90 UG/1
AEROSOL, METERED RESPIRATORY (INHALATION) PRN
Status: DISCONTINUED | OUTPATIENT
Start: 2018-01-17 | End: 2018-01-17

## 2018-01-17 RX ORDER — SODIUM CHLORIDE, SODIUM LACTATE, POTASSIUM CHLORIDE, CALCIUM CHLORIDE 600; 310; 30; 20 MG/100ML; MG/100ML; MG/100ML; MG/100ML
INJECTION, SOLUTION INTRAVENOUS CONTINUOUS
Status: DISCONTINUED | OUTPATIENT
Start: 2018-01-17 | End: 2018-01-17 | Stop reason: HOSPADM

## 2018-01-17 RX ORDER — FENTANYL CITRATE 50 UG/ML
25-50 INJECTION, SOLUTION INTRAMUSCULAR; INTRAVENOUS EVERY 5 MIN PRN
Status: DISCONTINUED | OUTPATIENT
Start: 2018-01-17 | End: 2018-01-17 | Stop reason: HOSPADM

## 2018-01-17 RX ORDER — HYDROCODONE BITARTRATE AND ACETAMINOPHEN 5; 325 MG/1; MG/1
1-2 TABLET ORAL EVERY 4 HOURS PRN
Qty: 20 TABLET | Refills: 0 | Status: SHIPPED | OUTPATIENT
Start: 2018-01-17 | End: 2018-03-20

## 2018-01-17 RX ADMIN — GLYCOPYRROLATE 0.2 MG: 0.2 INJECTION, SOLUTION INTRAMUSCULAR; INTRAVENOUS at 09:03

## 2018-01-17 RX ADMIN — Medication 0.25 MG: at 09:17

## 2018-01-17 RX ADMIN — ONDANSETRON 4 MG: 2 INJECTION INTRAMUSCULAR; INTRAVENOUS at 10:05

## 2018-01-17 RX ADMIN — Medication 100 MCG: at 07:49

## 2018-01-17 RX ADMIN — PROPOFOL 200 MG: 10 INJECTION, EMULSION INTRAVENOUS at 07:29

## 2018-01-17 RX ADMIN — EPHEDRINE SULFATE 5 MG: 50 INJECTION, SOLUTION INTRAMUSCULAR; INTRAVENOUS; SUBCUTANEOUS at 08:28

## 2018-01-17 RX ADMIN — PROPOFOL 100 MCG/KG/MIN: 10 INJECTION, EMULSION INTRAVENOUS at 07:40

## 2018-01-17 RX ADMIN — EPHEDRINE SULFATE 10 MG: 50 INJECTION, SOLUTION INTRAMUSCULAR; INTRAVENOUS; SUBCUTANEOUS at 09:02

## 2018-01-17 RX ADMIN — Medication 200 MCG: at 07:59

## 2018-01-17 RX ADMIN — Medication 200 MCG: at 08:13

## 2018-01-17 RX ADMIN — ALBUTEROL SULFATE 2 PUFF: 90 INHALANT RESPIRATORY (INHALATION) at 09:38

## 2018-01-17 RX ADMIN — EPHEDRINE SULFATE 10 MG: 50 INJECTION, SOLUTION INTRAMUSCULAR; INTRAVENOUS; SUBCUTANEOUS at 08:31

## 2018-01-17 RX ADMIN — GLYCOPYRROLATE 0.2 MG: 0.2 INJECTION, SOLUTION INTRAMUSCULAR; INTRAVENOUS at 08:26

## 2018-01-17 RX ADMIN — HYDROCODONE BITARTRATE AND ACETAMINOPHEN 1 TABLET: 5; 325 TABLET ORAL at 10:31

## 2018-01-17 RX ADMIN — PROPOFOL 200 MCG/KG/MIN: 10 INJECTION, EMULSION INTRAVENOUS at 07:25

## 2018-01-17 RX ADMIN — Medication 100 MCG: at 08:26

## 2018-01-17 RX ADMIN — SODIUM CHLORIDE, SODIUM LACTATE, POTASSIUM CHLORIDE, CALCIUM CHLORIDE: 600; 310; 30; 20 INJECTION, SOLUTION INTRAVENOUS at 06:39

## 2018-01-17 RX ADMIN — LIDOCAINE HYDROCHLORIDE 80 MG: 10 INJECTION, SOLUTION INFILTRATION; PERINEURAL at 07:29

## 2018-01-17 RX ADMIN — ONDANSETRON 4 MG: 2 INJECTION INTRAMUSCULAR; INTRAVENOUS at 09:25

## 2018-01-17 RX ADMIN — EPHEDRINE SULFATE 10 MG: 50 INJECTION, SOLUTION INTRAMUSCULAR; INTRAVENOUS; SUBCUTANEOUS at 08:58

## 2018-01-17 RX ADMIN — Medication 200 MCG: at 08:03

## 2018-01-17 RX ADMIN — SODIUM CHLORIDE, SODIUM LACTATE, POTASSIUM CHLORIDE, CALCIUM CHLORIDE: 600; 310; 30; 20 INJECTION, SOLUTION INTRAVENOUS at 07:15

## 2018-01-17 RX ADMIN — DEXAMETHASONE SODIUM PHOSPHATE 10 MG: 4 INJECTION, SOLUTION INTRA-ARTICULAR; INTRALESIONAL; INTRAMUSCULAR; INTRAVENOUS; SOFT TISSUE at 07:38

## 2018-01-17 RX ADMIN — ACETAMINOPHEN 975 MG: 325 TABLET ORAL at 06:39

## 2018-01-17 RX ADMIN — GABAPENTIN 300 MG: 300 CAPSULE ORAL at 06:39

## 2018-01-17 RX ADMIN — FENTANYL CITRATE 50 MCG: 50 INJECTION, SOLUTION INTRAMUSCULAR; INTRAVENOUS at 07:17

## 2018-01-17 NOTE — ANESTHESIA CARE TRANSFER NOTE
Patient: Anna Carrington    Procedure(s):  Right Diode Laser Stapedectomy - Wound Class: I-Clean    Diagnosis: Otosclerosis  Diagnosis Additional Information: No value filed.    Anesthesia Type:   General     Note:  Airway :Room Air  Patient transferred to:PACU  Comments: Patient awake and breathing spont. VSS. No complaints of pain or nausea. Report to RNHandoff Report: Identifed the Patient, Identified the Reponsible Provider, Reviewed the pertinent medical history, Discussed the surgical course, Reviewed Intra-OP anesthesia mangement and issues during anesthesia, Set expectations for post-procedure period and Allowed opportunity for questions and acknowledgement of understanding      Vitals: (Last set prior to Anesthesia Care Transfer)    CRNA VITALS  1/17/2018 0910 - 1/17/2018 0948      1/17/2018             Pulse: 110    Ht Rate: 105    SpO2: 95 %    Resp Rate (observed): (!)  2    Resp Rate (set): 10                Electronically Signed By: AMARILYS Rooney CRNA  January 17, 2018  9:48 AM

## 2018-01-17 NOTE — DISCHARGE INSTRUCTIONS
[facility]  Home Care Following Anesthesia  For 24 hours after surgery:  1. Get plenty of rest.  A responsible adult must stay with you for at least 24 hours after you leave the surgery center.  2. Do not drive or use heavy equipment.  If you have weakness or tingling, don't drive or use heavy equipment until this feeling goes away.   3. Do not drink alcohol.   4. Avoid strenuous or risky activities.  Ask for help when climbing stairs.  5. You may feel lightheaded.  IF so, sit for a few minutes before standing.  Have someone help you get up.   6. If you have nausea (feel sick to your stomach): Drink only clear liquids such as apple juice, ginger ale, broth or 7-Up.  Rest may also help.  Be sure to drink enough fluids.  Move to a regular diet as you feel able.   7. You may have a slight fever.  Call the doctor if your fever is over 100 F (37.7 C) (taken under the tongue) or lasts longer than 24 hours.  8. You may have a dry mouth, a sore throat, muscle aches or trouble sleeping. These should go away after 24 hours.  9. Do not make important or legal decisions.          Tips for taking pain medications  To get the best pain relief possible, remember these points:    Take pain medications as directed, before pain becomes severe.    Pain medication can upset your stomach: taking it with food may help.    Constipation is a common side effect of pain medication. Drink plenty of  fluids.    Eat foods high in fiber. Take a stool softener if recommended by your doctor or pharmacist.    Do not drink alcohol, drive or operate machinery while taking pain medications.    Ask about other ways to control pain, such as with heat, ice or relaxation.    Tylenol/Acetaminophen Consumption  To help encourage the safe use of acetaminophen, the makers of TYLENOL  have lowered the maximum daily dose for single-ingredient Extra Strength TYLENOL  (acetaminophen) products sold in the [place] from 8 pills  per day (4,000 mg) to 6 pills per day (3,000 mg). The dosing interval has also changed from 2 pills every 4-6 hours to 2 pills every 6 hours.    If you feel your pain relief is insufficient, you may take Tylenol/Acetaminophen in addition to your narcotic pain medication.     Be careful not to exceed 3,000 mg of Tylenol/Acetaminophen in a 24 hour period from all sources.    If you are taking extra strength Tylenol/acetaminophen (500 mg), the maximum dose is 6 tablets in 24 hours.    If you are taking regular strength acetaminophen (325 mg), the maximum dose is 9 tablets in 24 hours.    Call a doctor for any of the followin. Signs of infection (fever, growing tenderness at the surgery site, a large amount of drainage or bleeding, severe pain, foul-smelling drainage, redness, swelling).  2. It has been over 8 to 10 hours since surgery and you are still not able to urinate (pass water).  3. Headache for over 24 hours.  Your doctor is:  Dr. Anne Fang, ENT Otolaryngology: [phone]                 Or dial [phone] and ask for the resident on call for:  ENT Otolaryngology  For emergency care, call the:  [facility]:  [phone] (TTY for hearing impaired: [phone])

## 2018-01-17 NOTE — ANESTHESIA PREPROCEDURE EVALUATION
Anesthesia Evaluation     . Pt has had prior anesthetic. Type: General    No history of anesthetic complications          ROS/MED HX    ENT/Pulmonary:  - neg pulmonary ROS     Neurologic:  - neg neurologic ROS     Cardiovascular:  - neg cardiovascular ROS       METS/Exercise Tolerance:  4 - Raking leaves, gardening   Hematologic:  - neg hematologic  ROS       Musculoskeletal:  - neg musculoskeletal ROS       GI/Hepatic:  - neg GI/hepatic ROS       Renal/Genitourinary:  - ROS Renal section negative       Endo:  - neg endo ROS       Psychiatric:  - neg psychiatric ROS       Infectious Disease:  - neg infectious disease ROS       Malignancy:         Other:                     Physical Exam  Normal systems: dental    Airway   Mallampati: I  TM distance: >3 FB  Neck ROM: full    Dental     Cardiovascular   Rhythm and rate: regular and normal      Pulmonary    breath sounds clear to auscultation                    Anesthesia Plan      History & Physical Review  History and physical reviewed and following examination; no interval change.    ASA Status:  1 .    NPO Status:  > 6 hours    Plan for General and ETT with Intravenous induction. Maintenance will be TIVA.    PONV prophylaxis:  Ondansetron (or other 5HT-3) and Dexamethasone or Solumedrol       Postoperative Care  Postoperative pain management:  Oral pain medications and Multi-modal analgesia.      Consents  Anesthetic plan, risks, benefits and alternatives discussed with:  Patient..                          .

## 2018-01-17 NOTE — IP AVS SNAPSHOT
[facility]    [address] [place] [id number]    Phone:  [phone]    Fax:  [phone]                                       After Visit Summary   1/17/2018    Anna Carrington    MRN: [id number]           After Visit Summary Signature Page     I have received my discharge instructions, and my questions have been answered. I have discussed any challenges I see with this plan with the nurse or doctor.    ..........................................................................................................................................  Patient/Patient Representative Signature      ..........................................................................................................................................  Patient Representative Print Name and Relationship to Patient    ..................................................               ................................................  Date                                            Time    ..........................................................................................................................................  Reviewed by Signature/Title    ...................................................              ..............................................  Date                                                            Time

## 2018-01-17 NOTE — OR NURSING
Patient has nausea with emesis, given 2 doses of zofran in OR/PACU, also given dose of compazine IVP and patient is now sleeping

## 2018-01-17 NOTE — OR NURSING
Patient awake when stimulated, but sleepy otherwise. Up to bathroom and patient very unsteady. Patient returned to her chair to continue recovering longer

## 2018-01-17 NOTE — IP AVS SNAPSHOT
MRN:[id number]                      After Visit Summary   1/17/2018    Anna Carrington    MRN: [id number]           Thank you!     Thank you for choosing [facility] for your care. Our goal is always to provide you with excellent care. Hearing back from our patients is one way we can continue to improve our services. Please take a few minutes to complete the written survey that you may receive in the mail after you visit with us. Thank you!        Patient Information     Date Of Birth          1965        About your hospital stay     You were admitted on:  January 17, 2018 [facility]    You were discharged on:  January 17, 2018       Who to Call     For medical emergencies, please call 911.  For non-urgent questions about your medical care, please call your primary care provider or clinic, [phone]  For questions related to your surgery, please call your surgery clinic        Attending Provider     Provider Specialty    Anne Fang MD Otolaryngology       Primary Care Provider Office Phone # Fax #    Jeanette Luna [phone]      After Care Instructions     Discharge Instructions       Review outpatient procedure discharge instructions as directed by provider            Discharge Instructions - Diet as Tolerated       Return to diet before surgery, unless instructed otherwise.            Discharge Instructions - Lifting Limit (specify)       Lifting limit 10 pounds until seen at Post op follow up appointment            Remove dressing - 48 hours           Return to clinic       Return to clinic in 3 weeks for postoperative follow up.    Please start using the Floxin otic drops 3 days prior to your follow up appointment.            Shower        You may NOT shower for 48hrs postoperatively.    Once you start showering, cover your ear in the shower with a cup to prevent water getting into your ear  canal.            Wound care       Do not immerse wound in water until sutures removed                  Follow-up Appointments     Follow Up ([facility])       Follow up with Dr. Fang in the ENT clinic in 3 weeks for postoperative evaluation.    Appointments on [place] and/or [facility] (with [facility] or [facility] provider or service). Call [phone] if you haven't heard regarding these appointments within 7 days of discharge.                  Your next 10 appointments already scheduled     Feb 06, 2018  9:40 AM CST   (Arrive by 9:25 AM)   Return Visit with Anne Fang MD   [facility] ([facility])    [address]  [address] [facility] [address] [phone]              Further instructions from your care team         [facility]  Home Care Following Anesthesia  For 24 hours after surgery:  1. Get plenty of rest.  A responsible adult must stay with you for at least 24 hours after you leave the surgery center.  2. Do not drive or use heavy equipment.  If you have weakness or tingling, don't drive or use heavy equipment until this feeling goes away.   3. Do not drink alcohol.   4. Avoid strenuous or risky activities.  Ask for help when climbing stairs.  5. You may feel lightheaded.  IF so, sit for a few minutes before standing.  Have someone help you get up.   6. If you have nausea (feel sick to your stomach): Drink only clear liquids such as apple juice, ginger ale, broth or 7-Up.  Rest may also help.  Be sure to drink enough fluids.  Move to a regular diet as you feel able.   7. You may have a slight fever.  Call the doctor if your fever is over 100 F (37.7 C) (taken under the tongue) or lasts longer than 24 hours.  8. You may have a dry mouth, a sore throat, muscle aches or trouble sleeping. These should go away after 24 hours.  9. Do not make important or legal decisions.          Tips for taking pain  medications  To get the best pain relief possible, remember these points:    Take pain medications as directed, before pain becomes severe.    Pain medication can upset your stomach: taking it with food may help.    Constipation is a common side effect of pain medication. Drink plenty of  fluids.    Eat foods high in fiber. Take a stool softener if recommended by your doctor or pharmacist.    Do not drink alcohol, drive or operate machinery while taking pain medications.    Ask about other ways to control pain, such as with heat, ice or relaxation.    Tylenol/Acetaminophen Consumption  To help encourage the safe use of acetaminophen, the makers of TYLENOL  have lowered the maximum daily dose for single-ingredient Extra Strength TYLENOL  (acetaminophen) products sold in the [place] from 8 pills per day (4,000 mg) to 6 pills per day (3,000 mg). The dosing interval has also changed from 2 pills every 4-6 hours to 2 pills every 6 hours.    If you feel your pain relief is insufficient, you may take Tylenol/Acetaminophen in addition to your narcotic pain medication.     Be careful not to exceed 3,000 mg of Tylenol/Acetaminophen in a 24 hour period from all sources.    If you are taking extra strength Tylenol/acetaminophen (500 mg), the maximum dose is 6 tablets in 24 hours.    If you are taking regular strength acetaminophen (325 mg), the maximum dose is 9 tablets in 24 hours.    Call a doctor for any of the followin. Signs of infection (fever, growing tenderness at the surgery site, a large amount of drainage or bleeding, severe pain, foul-smelling drainage, redness, swelling).  2. It has been over 8 to 10 hours since surgery and you are still not able to urinate (pass water).  3. Headache for over 24 hours.  Your doctor is:  Dr. Anne Fang, ENT Otolaryngology: [phone]                 Or dial [phone] and ask for the resident on call for:  ENT Otolaryngology  For emergency care, call the:  [facility]  "Emergency Department:  [phone] (TTY for hearing impaired: [phone])                Additional Information     If you use hormonal birth control (such as the pill, patch, ring or implants): You'll need a second form of birth control for 7 days (condoms, a diaphragm or contraceptive foam). While in the hospital, you received a medicine called Bridion. Your normal birth control will not work as well for a week after taking this medicine.          Pending Results     No orders found from 1/15/2018 to 1/18/2018.            Admission Information     Date & Time Provider Department Dept. Phone    1/17/2018 Anne Fang MD [facility] [phone]      Your Vitals Were     Blood Pressure Pulse Temperature Respirations Height Weight    115/80 89 97.2  F (36.2  C) (Temporal) 15 1.727 m (5' 8\") 72.6 kg (160 lb)    Pulse Oximetry BMI (Body Mass Index)                93% 24.33 kg/m2          interclick Information     interclick gives you secure access to your electronic health record. If you see a primary care provider, you can also send messages to your care team and make appointments. If you have questions, please call your primary care clinic.  If you do not have a primary care provider, please call [phone] and they will assist you.      interclick is an electronic gateway that provides easy, online access to your medical records. With interclick, you can request a clinic appointment, read your test results, renew a prescription or communicate with your care team.     To access your existing account, please contact your [facility] or call [phone] for assistance.        Care EveryWhere ID     This is your Care EveryWhere ID. This could be used by other organizations to access your [place] medical records  [id number]        Equal Access to Services     BRADY SALDAÑA AH: rich Hooper qaybta kaalmada adeegyada, waxay " eric thomsonmichelle lopez'aan ah. So [facility] [phone].    ATENCIÓN: Si zane molina, tiene a loyola disposición servicios gratuitos de asistencia lingüística. Sohail al [phone].    We comply with applicable federal civil rights laws and [place] laws. We do not discriminate on the basis of race, color, national origin, age, disability, sex, sexual orientation, or gender identity.               Review of your medicines      START taking        Dose / Directions    cefuroxime 250 MG tablet   Commonly known as:  CEFTIN   Used for:  Otosclerosis of right ear        Dose:  250 mg   Take 1 tablet (250 mg) by mouth 2 times daily for 7 days   Quantity:  14 tablet   Refills:  0       HYDROcodone-acetaminophen 5-325 MG per tablet   Commonly known as:  NORCO   Used for:  Otosclerosis of right ear        Dose:  1-2 tablet   Take 1-2 tablets by mouth every 4 hours as needed for moderate to severe pain   Quantity:  20 tablet   Refills:  0       predniSONE 20 MG tablet   Commonly known as:  DELTASONE   Used for:  Otosclerosis of right ear        Take 3 tabs (60 mg) by mouth daily x 2 days, 2 tabs (40 mg) daily x 2 days, 1 tab (20 mg) daily x 2 days, then 1/2 tab (10 mg) x 2 days.   Quantity:  20 tablet   Refills:  0         CONTINUE these medicines which have NOT CHANGED        Dose / Directions    CALCIUM 500+D PO        Dose:  1 tablet   Take 1 tablet by mouth 2 times daily.   Refills:  0       CLARITIN 10 MG capsule   Generic drug:  loratadine        Dose:  10 mg   Take 10 mg by mouth daily   Refills:  0       DAILY VITAMIN PO        Dose:  1 tablet   Take 1 tablet by mouth daily.   Refills:  0       OMEGA-3 FISH OIL PO        Refills:  0            Where to get your medicines      These medications were sent to [place], [place] - [phone]  [address] [address]    Hours:  TRANSPLANT PHONE NUMBER [phone] Phone:  [phone]      cefuroxime 250 MG tablet    predniSONE 20 MG tablet         Some of these will need a paper prescription and others can be bought over the counter. Ask your nurse if you have questions.     Bring a paper prescription for each of these medications     HYDROcodone-acetaminophen 5-325 MG per tablet               ANTIBIOTIC INSTRUCTION     You've Been Prescribed an Antibiotic - Now What?  Your healthcare team thinks that you or your loved one might have an infection. Some infections can be treated with antibiotics, which are powerful, life-saving drugs. Like all medications, antibiotics have side effects and should only be used when necessary. There are some important things you should know about your antibiotic treatment.      Your healthcare team may run tests before you start taking an antibiotic.    Your team may take samples (e.g., from your blood, urine or other areas) to run tests to look for bacteria. These test can be important to determine if you need an antibiotic at all and, if you do, which antibiotic will work best.      Within a few days, your healthcare team might change or even stop your antibiotic.    Your team may start you on an antibiotic while they are working to find out what is making you sick.    Your team might change your antibiotic because test results show that a different antibiotic would be better to treat your infection.    In some cases, once your team has more information, they learn that you do not need an antibiotic at all. They may find out that you don't have an infection, or that the antibiotic you're taking won't work against your infection. For example, an infection caused by a virus can't be treated with antibiotics. Staying on an antibiotic when you don't need it is more likely to be harmful than helpful.      You may experience side effects from your antibiotic.    Like all medications, antibiotics have side effects. Some of these can be serious.    Let you healthcare team  know if you have any known allergies when you are admitted to the hospital.    One significant side effect of nearly all antibiotics is the risk of severe and sometimes deadly diarrhea caused by Clostridium difficile (C. Difficile). This occurs when a person takes antibiotics because some good germs are destroyed. Antibiotic use allows C. diificile to take over, putting patients at high risk for this serious infection.    As a patient or caregiver, it is important to understand your or your loved one's antibiotic treatment. It is especially important for caregivers to speak up when patients can't speak for themselves. Here are some important questions to ask your healthcare team.    What infection is this antibiotic treating and how do you know I have that infection?    What side effects might occur from this antibiotic?    How long will I need to take this antibiotic?    Is it safe to take this antibiotic with other medications or supplements (e.g., vitamins) that I am taking?     Are there any special directions I need to know about taking this antibiotic? For example, should I take it with food?    How will I be monitored to know whether my infection is responding to the antibiotic?    What tests may help to make sure the right antibiotic is prescribed for me?      Information provided by:  www.cdc.gov/getsmart  U.S. Department of Health and Human Services  Centers for disease Control and Prevention  National Center for Emerging and Zoonotic Infectious Diseases  Division of Healthcare Quality Promotion         Protect others around you: Learn how to safely use, store and throw away your medicines at www.disposemymeds.org.             Medication List: This is a list of all your medications and when to take them. Check marks below indicate your daily home schedule. Keep this list as a reference.      Medications           Morning Afternoon Evening Bedtime As Needed    CALCIUM 500+D PO   Take 1 tablet by mouth 2  times daily.                                cefuroxime 250 MG tablet   Commonly known as:  CEFTIN   Take 1 tablet (250 mg) by mouth 2 times daily for 7 days                                CLARITIN 10 MG capsule   Take 10 mg by mouth daily   Generic drug:  loratadine                                DAILY VITAMIN PO   Take 1 tablet by mouth daily.                                HYDROcodone-acetaminophen 5-325 MG per tablet   Commonly known as:  NORCO   Take 1-2 tablets by mouth every 4 hours as needed for moderate to severe pain                                OMEGA-3 FISH OIL PO                                predniSONE 20 MG tablet   Commonly known as:  DELTASONE   Take 3 tabs (60 mg) by mouth daily x 2 days, 2 tabs (40 mg) daily x 2 days, 1 tab (20 mg) daily x 2 days, then 1/2 tab (10 mg) x 2 days.

## 2018-01-17 NOTE — ANESTHESIA POSTPROCEDURE EVALUATION
Patient: Anna Carrington    Procedure(s):  Right Diode Laser Stapedectomy - Wound Class: I-Clean    Diagnosis:Otosclerosis  Diagnosis Additional Information: No value filed.    Anesthesia Type:  General    Note:  Anesthesia Post Evaluation    Patient location during evaluation: bedside  Patient participation: Able to fully participate in evaluation  Level of consciousness: awake  Pain management: adequate  Airway patency: patent  Cardiovascular status: acceptable  Respiratory status: acceptable  Hydration status: acceptable  PONV: controlled     Anesthetic complications: None          Last vitals:  Vitals:    01/17/18 1011 01/17/18 1027 01/17/18 1040   BP: 115/80 110/73 110/65   Pulse: 89     Resp: 15 16    Temp: 36.2  C (97.2  F)     SpO2: 93% 96% 96%         Electronically Signed By: Albert Evans MD, MD  January 17, 2018  10:50 AM

## 2018-01-17 NOTE — BRIEF OP NOTE
[facility]    Brief Operative Note    Pre-operative diagnosis: Otosclerosis  Post-operative diagnosis * No post-op diagnosis entered *  Procedure: Procedure(s):  Right Diode Laser Stapedectomy - Wound Class: I-Clean  Surgeon: Surgeon(s) and Role:     * Anne Fang MD - Primary  Anesthesia: General   Estimated blood loss: Minimal  Drains: None  Specimens: * No specimens in log *  Findings:   synechia around stapes suprastructure.  Complications: None.  Implants: [employer] Acmi classic stapes prosthesis; Large well 1mm I.D., Narrow shaft 0.4mm, 4mm long, titanium; REF: [id number], LOT: [id number]

## 2018-01-19 ENCOUNTER — TELEPHONE (OUTPATIENT)
Dept: OTOLARYNGOLOGY | Facility: CLINIC | Age: 53
End: 2018-01-19

## 2018-01-19 NOTE — TELEPHONE ENCOUNTER
Patient calls for review of cares : when she can bathe,  , take off dressing,she also reports revved up tinnitus( had tinnitus prior to surgery). Revved up' tinnitus not unusual as reported: she is taking prednisone appropriately now, and will call if has continuing? She understands that tinnitus may or may not change with surgery, but is too early to  We discussed these and reviewed usual:  Post operative care for ear surgery:    - No shower until 48 hours after surgery. It is best to shower with a shower cap in place and then use a cup to wash hair over the sink as to not let your incision be soaked with water for the first  5 days. Keep incision clean and dry, do not scrub.  - DRY EAR PRECAUTIONS:Use a cotton ball placed in ear canal, then coated with Vaseline externally to keep water out of ear canal. Do this until first post op appointment.  - For the next 2 weeks, no heavy lifting more than 5 pounds, no strenuous activities. No nose blowing. Sneeze with your mouth open. Stool softeners as needed to avoid straining with bowel movement. No airline travel until after 1st post op check.

## 2018-01-21 NOTE — OP NOTE
PREOPERATIVE DIAGNOSIS:  Right conductive hearing loss.      POSTOPERATIVE DIAGNOSES:   1.  Right otosclerosis.   2.  Right ossicular malformation.      PROCEDURES:   1.  Right partial stapedectomy with Diode laser.   2.  Continuous facial nerve monitoring.      ATTENDING SURGEON:  Anne Fang MD      RESIDENT SURGEON:  Zee Sequeira MD      ANESTHESIA:  General.      ESTIMATED BLOOD LOSS:  2 mL      INDICATIONS FOR PROCEDURE:  Ms. Carrington is a 52-year-old woman who has right conductive hearing loss.  CT demonstrates evidence of antefenestral otosclerosis based on hypodensity.  After discussing the alternatives, risks and benefits, she determined she would like to proceed with a middle ear exploration and stapedectomy.      INTRAOPERATIVE FINDINGS:  The stapes is indeed fixed with bright white otosclerosis that is present anterior to the footplate, but also is very prominent along the superior aspect of the cochlear promontory, making a very deep overhanging oval window niche.  Additionally, the incus long process is abnormally shaped.  The long process itself stops a bit short, and the lenticular process has a distinct offset appearance, moving as one takes the bend of the long process, and the lenticular process sticks out more inferiorly like a step-off, and this is attached to the stapes superstructure, which has a very broad posterior sandra.  The footplate itself is quite thin.  The facial nerve is not overhanging, but there are many adhesion bands between the stapes and the facial canal that are lysed for visualization.  The prosthesis placed is the [employer] classic stapes prosthesis made of titanium with a large well and narrow shaft with 4 mm length.      PROCEDURE:  The patient was brought to the operating room and placed supine on the operating room table.  General endotracheal anesthesia was administered.  The table was turned 180 degrees.  Facial nerve monitoring electrodes were placed within the right  orbicularis oculi and orbicularis oris with a ground in the shoulder.  Tap test was performed, and impedances were checked and verified, and continuous facial nerve monitoring was performed throughout the procedure.  The ear was prepped and draped in the usual sterile fashion.  Underneath the otomicroscope, the ear canal was inspected, and the tympanic membrane appeared normal.  The vascular strip and posterior ear canal were instilled with 1% lidocaine with 1:100,000 epinephrine.  Next, a fascia graft was harvested from the supraauricular region.  A small incision was carried down the hairline to the level of the temporalis fascia.  A temporalis fascia graft was harvested and set aside to dry for later use.  Hemostasis was achieved with electrocautery, and the incision was closed with 4-0 chromic suture in interrupted fashion.  Steri-Strips were applied with Mastisol at the conclusion of the procedure.      Next, underneath the otomicroscope, the tympanomeatal flap was incised and elevated and the middle ear space entered.  There was very prominent scutal overhang, and the chorda tympani nerve itself was embedded in bone on all sides, over more than half of the distance from which it would typically travel from the iter to the malleus.  This bone was removed, and the chorda was reflected superiorly, keeping it intact.  The middle ear was inspected.  There was markedly white bone over the superior aspect of the cochlear promontory and in the antefenestral region.  There were many adhesion bands along the superior aspect of the stapes.  The incus was also visualized with the findings noted above.  With motion of the malleus, this incus moved, but the staples had no motion.  Direct palpation of the stapes also confirmed that there was no overall window motion.  The round window membrane was not visible.  There was a marked overhang.  Some small adhesions were removed from that region, and still one could not visualize  any aspect of the true round window membrane, but there was no evidence of otosclerosis clearly seen in that region.  The measuring desire was used, and the distance from the undersurface of the incus to the stapes was about 3.5 mm.  The incudostapedial joint was sectioned medial to the step-off lenticular process, which was slightly mobile, and the Diode laser was then used to vaporize the stapedial tendon and the attachment of the posterior sandra to the footplate.  The superstructure was then down fractured and removed from the ear.  The otosclerosis on the cochlea obscured the lower half of the footplate, and so this region was gently lasered and gently scraped in regions, taking care only to remove portions that were overhanging and to avoid any potential entrance into the cochlea itself.        Next, the laser was used to incise the footplate between the junction of the anterior one-third and posterior two-thirds, and it was entered.  It was very thin in this region, and there was perilymph egress.  The rasp was used to remove the posterior portion, and there were some small, very thin shards of the bone left along the edges, as the footplate was not a nice solid piece to permit full removal of the posterior two-thirds.  The fascia graft was placed over this fenestra and then the prosthesis brought to it and then up onto this lenticular process.  The bucket handle was brought laterally, but did not go up onto the long process of the incus because of the step-off of the lenticular process.  Thus, a small additional piece of fascia was placed lateral to the handle and incus, and the fascia graft over the oval window was spread over the facial nerve and the promontory for a seal.  Palpation of the prosthesis yielded appropriate sensation of motion, but because I could not see the round window even with some fluid in that region, I could not confirm a round window reflex.  However, there was free motion of the  prosthesis in the fenestra.  The tympanomeatal flap was put back into anatomic position and secured with Floxin-soaked Gelfoam strips, a cotton ball and Band-Aid were placed, and the patient was turned over to Anesthesia, where she awakened in stable condition.      Anne GRAY, the attending physician, was present and performed all portions of the procedure.         ANNE HERNANDEZ MD             D: 2018 16:42   T: 2018 16:44   MT: meño      Name:     ALYSON NEVAREZ   MRN:      [id number]        Account:        [id number]   :      1965           Procedure Date: 2018      Document: [id number]

## 2018-02-06 ENCOUNTER — OFFICE VISIT (OUTPATIENT)
Dept: OTOLARYNGOLOGY | Facility: CLINIC | Age: 53
End: 2018-02-06
Payer: COMMERCIAL

## 2018-02-06 VITALS — BODY MASS INDEX: 23.49 KG/M2 | HEIGHT: 68 IN | WEIGHT: 155 LBS

## 2018-02-06 DIAGNOSIS — H80.91 OTOSCLEROSIS OF RIGHT EAR: Primary | ICD-10-CM

## 2018-02-06 ASSESSMENT — PAIN SCALES - GENERAL: PAINLEVEL: NO PAIN (0)

## 2018-02-06 NOTE — PATIENT INSTRUCTIONS
1. Please follow-up in clinic in 6 weeks with Dr. Fang with an audio prior.   2. Please call the ENT clinic with any questions,concerns, new or worsening symptoms.    -Clinic number is [phone]   - Elsi's direct line (Dr. Fang' nurse) [phone]

## 2018-02-06 NOTE — NURSING NOTE
"Chief Complaint   Patient presents with     RECHECK     Post op Stapedectomy      Height 1.727 m (5' 8\"), weight 70.3 kg (155 lb).    Demarco Shin    "

## 2018-02-06 NOTE — MR AVS SNAPSHOT
After Visit Summary   2/6/2018    Anna Carrington    MRN: [id number]           Patient Information     Date Of Birth          1965        Visit Information        Provider Department      2/6/2018 9:40 AM Anne Fang MD M [facility] Ear Nose and Throat        Care Instructions    1. Please follow-up in clinic in 6 weeks with Dr. Fang with an audio prior.   2. Please call the ENT clinic with any questions,concerns, new or worsening symptoms.    -Clinic number is [phone]   - Elsi's direct line (Dr. Fang' nurse) [phone]              Follow-ups after your visit        Your next 10 appointments already scheduled     Mar 20, 2018  8:00 AM CDT   (Arrive by 7:45 AM)   Return Visit with MD AYALA Vieyra [facility] Ear Nose and Throat ([facility])    [address] [address] [phone]              Who to contact     Please call your clinic at [phone] to:    Ask questions about your health    Make or cancel appointments    Discuss your medicines    Learn about your test results    Speak to your doctor   If you have compliments or concerns about an experience at your clinic, or if you wish to file a complaint, please contact [facility] Patient Relations at [phone] or email us at [email or web address]         Additional Information About Your Visit        MyChart Information     [employer] gives you secure access to your electronic health record. If you see a primary care provider, you can also send messages to your care team and make appointments. If you have questions, please call your primary care clinic.  If you do not have a primary care provider, please call [phone] and they will assist you.      Momspot is an electronic gateway that provides easy, online access to your medical records. With Momspot, you can request a clinic appointment, read your test  "results, renew a prescription or communicate with your care team.     To access your existing account, please contact your [facility] or call [phone] for assistance.        Care EveryWhere ID     This is your Care EveryWhere ID. This could be used by other organizations to access your [place] medical records  [id number]        Your Vitals Were     Height BMI (Body Mass Index)                1.727 m (5' 8\") 23.57 kg/m2           Blood Pressure from Last 3 Encounters:   01/17/18 107/64   11/21/12 123/84   08/15/12 122/72    Weight from Last 3 Encounters:   02/06/18 70.3 kg (155 lb)   01/17/18 72.6 kg (160 lb)   10/03/17 71.2 kg (157 lb)              Today, you had the following     No orders found for display       Primary Care Provider Office Phone # Fax #    Jeanette Luna [phone]       [place] [address]        Equal Access to Services     DINORA SALDAÑA : Hadii aad ku hadasho Soomaali, waaxda luqadaha, qaybta kaalmada adeegyada, waxay idiin hayernan mandy spain . So [facility] [phone].    ATENCIÓN: Si habla español, tiene a loyola disposición servicios gratuitos de asistencia lingüística. [facility] [phone].    We comply with applicable federal civil rights laws and [place] laws. We do not discriminate on the basis of race, color, national origin, age, disability, sex, sexual orientation, or gender identity.            Thank you!     Thank you for choosing [facility] EAR NOSE AND THROAT  for your care. Our goal is always to provide you with excellent care. Hearing back from our patients is one way we can continue to improve our services. Please take a few minutes to complete the written survey that you may receive in the mail after your visit with us. Thank you!             Your Updated Medication List - Protect others around you: Learn how to safely use, store and throw away your medicines at " www.disposemymeds.org.          This list is accurate as of 2/6/18 10:26 AM.  Always use your most recent med list.                   Brand Name Dispense Instructions for use Diagnosis    Calcium carb-Vitamin D 500 mg [place]-200 units 500-200 MG-UNIT per tablet    OSCAL with D;Oyster Shell Calcium          HYDROcodone-acetaminophen 5-325 MG per tablet    NORCO    20 tablet    Take 1-2 tablets by mouth every 4 hours as needed for moderate to severe pain    Otosclerosis of right ear       predniSONE 20 MG tablet    DELTASONE    20 tablet    Take 3 tabs (60 mg) by mouth daily x 2 days, 2 tabs (40 mg) daily x 2 days, 1 tab (20 mg) daily x 2 days, then 1/2 tab (10 mg) x 2 days.    Otosclerosis of right ear

## 2018-02-06 NOTE — LETTER
2/6/2018       RE: Anna Carrington  [address] [address]     Dear Colleague,    Thank you for referring your patient, Anna Carrington, to the [facility] EAR NOSE AND THROAT at [facility]. Please see a copy of my visit note below.    I had the pleasure of seeing Anna Carrington in postoperative follow-up today following right partial stapedectomy on January 17, 2018.  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  A Hernandez bucket prosthesis made of titanium 4 mm in length was placed.    She has been doing well since the surgery.  She is able to hear out of the operated ear although it is too early to tell how much improvement has occurred.  She has mild dysgeusia which is improving.  She did not have significant vertigo in the postoperative timeframe.  She does not have bothersome tinnitus.    On physical examination, she appears well.  The supra-auricular incision is well-healed.  The packing was removed from the right ear canal.  The tympanomeatal flap is nicely seated.  The middle ear space is aerated.  There is no retraction.  512 Hz tuning fork is heard more loudly by air-conduction and by bone conduction in the operated right ear.  This is an improvement compared to the preoperative testing.    Ms. Carrington is doing well following the stapes procedure.  We discussed the intraoperative findings and also that these may portend a more variable outcome from the surgery given the ossicular malformation, orientation of the prosthesis and thickness of the footplate.  We reviewed dry ear precautions and some activity restrictions.  She will return for an audiogram in 6-8 weeks.  I will be happy to see her sooner if any other concerns arise.    Anne Fang MD

## 2018-02-16 NOTE — PROGRESS NOTES
I had the pleasure of seeing Anna Carrington in postoperative follow-up today following right partial stapedectomy on January 17, 2018.  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  A Hernandez bucket prosthesis made of titanium 4 mm in length was placed.    She has been doing well since the surgery.  She is able to hear out of the operated ear although it is too early to tell how much improvement has occurred.  She has mild dysgeusia which is improving.  She did not have significant vertigo in the postoperative timeframe.  She does not have bothersome tinnitus.    On physical examination, she appears well.  The supra-auricular incision is well-healed.  The packing was removed from the right ear canal.  The tympanomeatal flap is nicely seated.  The middle ear space is aerated.  There is no retraction.  512 Hz tuning fork is heard more loudly by air-conduction and by bone conduction in the operated right ear.  This is an improvement compared to the preoperative testing.    Ms. Carrington is doing well following the stapes procedure.  We discussed the intraoperative findings and also that these may portend a more variable outcome from the surgery given the ossicular malformation, orientation of the prosthesis and thickness of the footplate.  We reviewed dry ear precautions and some activity restrictions.  She will return for an audiogram in 6-8 weeks.  I will be happy to see her sooner if any other concerns arise.

## 2018-03-16 ENCOUNTER — OFFICE VISIT (OUTPATIENT)
Dept: AUDIOLOGY | Facility: CLINIC | Age: 53
End: 2018-03-16
Payer: COMMERCIAL

## 2018-03-16 DIAGNOSIS — H90.A22 SENSORINEURAL HEARING LOSS (SNHL) OF LEFT EAR WITH RESTRICTED HEARING OF RIGHT EAR: ICD-10-CM

## 2018-03-16 DIAGNOSIS — H90.A31 MIXED CONDUCTIVE AND SENSORINEURAL HEARING LOSS OF RIGHT EAR WITH RESTRICTED HEARING OF LEFT EAR: ICD-10-CM

## 2018-03-16 NOTE — MR AVS SNAPSHOT
After Visit Summary   3/16/2018    Anna Carrington    MRN: [id number]           Patient Information     Date Of Birth          1965        Visit Information        Provider Department      3/16/2018 9:00 AM Sruthi Chanel, [facility]        Today's Diagnoses     Mixed conductive and sensorineural hearing loss of right ear with restricted hearing of left ear        Sensorineural hearing loss (SNHL) of left ear with restricted hearing of right ear           Follow-ups after your visit        Your next 10 appointments already scheduled     Mar 20, 2018  8:00 AM CDT   (Arrive by 7:45 AM)   Return Visit with Anne Fang MD   [facility] N[facility] ([facility])    [address]  [address] [facility] [address] [phone]              Who to contact     Please call your clinic at [phone] to:    Ask questions about your health    Make or cancel appointments    Discuss your medicines    Learn about your test results    Speak to your doctor            Additional Information About Your Visit        EcatoharKaesu Information     [employer] gives you secure access to your electronic health record. If you see a primary care provider, you can also send messages to your care team and make appointments. If you have questions, please call your primary care clinic.  If you do not have a primary care provider, please call [phone] and they will assist you.      [employer] is an electronic gateway that provides easy, online access to your medical records. With [employer], you can request a clinic appointment, read your test results, renew a prescription or communicate with your care team.     To access your existing account, please contact your [facility] or call [phone] for assistance.        Care EveryWhere ID     This is your Care EveryWhere ID. This could be used by other organizations to access your [place]  medical records  [id number]         Blood Pressure from Last 3 Encounters:   01/17/18 107/64   11/21/12 123/84   08/15/12 122/72    Weight from Last 3 Encounters:   02/06/18 70.3 kg (155 lb)   01/17/18 72.6 kg (160 lb)   10/03/17 71.2 kg (157 lb)              We Performed the Following     AUDIOGRAM/TYMPANOGRAM - INTERFACE     [facility] Thr[facility]ld Eval & Speech Recog (52483)     Reduced 52 - Tymps / Reflex   (89460)        Primary Care Provider Office Phone # Fax #    Jeanette Luna [phone]       [address] [address]        Equal Access to Services     BRADY SALDAÑA : Hadii aad ku hadasho Soomaali, waaxda luqadaha, qaybta kaalmada adeegyada, waxay idiin hayalonzo brennan. So [facility] [phone].    ATENCIÓN: Si habla español, tiene a loyola disposición servicios gratuitos de asistencia lingüística. [facility] [phone].    We comply with applicable federal civil rights laws and [place] laws. We do not discriminate on the basis of race, color, national origin, age, disability, sex, sexual orientation, or gender identity.            Thank you!     Thank you for choosing [facility] AUDIOLOGY  for your care. Our goal is always to provide you with excellent care. Hearing back from our patients is one way we can continue to improve our services. Please take a few minutes to complete the written survey that you may receive in the mail after your visit with us. Thank you!             Your Updated Medication List - Protect others around you: Learn how to safely use, store and throw away your medicines at www.disposemymeds.org.          This list is accurate as of 3/16/18  9:24 AM.  Always use your most recent med list.                   Brand Name Dispense Instructions for use Diagnosis    Calcium carb-Vitamin D 500 mg [place]-200 units 500-200 MG-UNIT per tablet    OSCAL with D;Oyster Shell Calcium          HYDROcodone-acetaminophen 5-325 MG per tablet     NORCO    20 tablet    Take 1-2 tablets by mouth every 4 hours as needed for moderate to severe pain    Otosclerosis of right ear       predniSONE 20 MG tablet    DELTASONE    20 tablet    Take 3 tabs (60 mg) by mouth daily x 2 days, 2 tabs (40 mg) daily x 2 days, 1 tab (20 mg) daily x 2 days, then 1/2 tab (10 mg) x 2 days.    Otosclerosis of right ear

## 2018-03-16 NOTE — PROCEDURES
AUDIOLOGY REPORT    SUMMARY: Audiology visit completed. See audiogram for results.      RECOMMENDATIONS: Follow-up with ENT.    Laurie Villarreal, [facility]  Licensed Audiologist  [place] License #[id number]

## 2018-03-20 ENCOUNTER — OFFICE VISIT (OUTPATIENT)
Dept: OTOLARYNGOLOGY | Facility: CLINIC | Age: 53
End: 2018-03-20
Payer: COMMERCIAL

## 2018-03-20 VITALS — HEIGHT: 68 IN | WEIGHT: 155 LBS | BODY MASS INDEX: 23.49 KG/M2

## 2018-03-20 DIAGNOSIS — H80.91 OTOSCLEROSIS OF RIGHT EAR: Primary | ICD-10-CM

## 2018-03-20 ASSESSMENT — PAIN SCALES - GENERAL: PAINLEVEL: NO PAIN (0)

## 2018-03-20 NOTE — PATIENT INSTRUCTIONS
1. Please follow-up in clinic in 6 months with an audiogram.   2. Please call the ENT clinic with any questions,concerns, new or worsening symptoms.    -Clinic number is [phone]   - Elsi's direct line (Dr. Fang' nurse) [phone]

## 2018-03-20 NOTE — LETTER
3/20/2018       RE: Anna Carrington  [address] [address]     Dear Colleague,    Thank you for referring your patient, Anna Carrington, to the [facility] EAR NOSE AND THROAT at [facility]. Please see a copy of my visit note below.    I had the pleasure of seeing Anna Carrington back in followup today.      HISTORY OF PRESENT ILLNESS:  She is a 52-year-old woman who has both right otosclerosis as well as an apparent ossicular malformation noted at the time of surgery.  On January 17, she underwent a right partial stapedectomy, and a 4 mm Gyrus classic stapes prosthesis was placed.  Since the surgery, she has noted improvement in hearing.  It is not the same as the other side, but she delineated several situations in which it has been advantageous to have better hearing including when sitting in a situation where there are multiple talkers with one person was on her right, and she was able to carry on the conversation.  Opera is easier to understand as well.  She continues to have taste change on the right side of the tongue in the area affected.  The intensity of this has not changed over time.  She does not have any other associated symptoms.      PHYSICAL EXAMINATION:  She appeared well.  The ear canal skin is very healthy.  Tympanic membrane is sitting in good position.  There was a small flaking piece of dried blood on the surface of the drum that precluded my complete visualization of the most posterior inferior aspect.  Superior to this, the drum is shiny.      AUDIOGRAM:  The audiogram today demonstrates on the left unoperated ear, she has a normal sloping to mild sensorineural loss with 20 dB speech reception threshold (SRT) and 96% word recognition score.  On the right, there is a mild to moderately severe mixed loss.  The air conduction thresholds have improved 15-45 dB and they nearly approximate the bone line that was tested prior to the  surgery across the low and mid frequency range, but there is still an air bone gap at 4000 Hz apparent.  Speech reception threshold (SRT) is 40 compared to 75 preop and word understanding scores remain excellent at 96%.      IMPRESSION AND RECOMMENDATIONS:  Ms. Carrington has had improvement in hearing following a stapes procedure.  The hearing is not symmetric with the left side but does approximate the native bone line.  We reviewed the altered anatomy noted at the time of the procedure.  Given that inner ear hearing has been preserved and there has been substantial improvement, we are both satisfied with this result at this time.  I would recommend that she return to see me in six months for ear examination to make sure that the tympanic membrane remains healthy.  We will also assess for hearing stability at that time.           Again, thank you for allowing me to participate in the care of your patient.      Sincerely,    Anne Fang MD

## 2018-03-20 NOTE — NURSING NOTE
Chief Complaint   Patient presents with     RECHECK     post op 1/17/18 rt staodectomy     Aminta Her Medical Assistant

## 2018-03-20 NOTE — MR AVS SNAPSHOT
After Visit Summary   3/20/2018    Anna Carrington    MRN: [id number]           Patient Information     Date Of Birth          1965        Visit Information        Provider Department      3/20/2018 8:00 AM Anne Fang MD M [facility] Ear Nose and Throat         Follow-ups after your visit        Your next 10 appointments already scheduled     Sep 21, 2018  9:30 AM CDT   Walk In From ENT with Angi Squires [facility] ([facility])    [address] [place] [address] [phone]            Sep 21, 2018 10:30 AM CDT   (Arrive by 10:15 AM)   Return Visit with MD AYALA Vieyra [facility] Ear Nose and Throat ([facility])    [address] [place] [address] [phone]              Who to contact     Please call your clinic at [phone] to:    Ask questions about your health    Make or cancel appointments    Discuss your medicines    Learn about your test results    Speak to your doctor            Additional Information About Your Visit        LinkCyclehart Information     [employer] gives you secure access to your electronic health record. If you see a primary care provider, you can also send messages to your care team and make appointments. If you have questions, please call your primary care clinic.  If you do not have a primary care provider, please call [phone] and they will assist you.      M[employer]rix is an electronic gateway that provides easy, online access to your medical records. With [employer], you can request a clinic appointment, read your test results, renew a prescription or communicate with your care team.     To access your existing account, please contact your [facility] or call [phone] for assistance.        Care EveryWhere ID     This is your Care EveryWhere ID. This could be used by other organizations to access  "your [place] medical records  [id number]        Your Vitals Were     Height BMI (Body Mass Index)                1.727 m (5' 7.99\") 23.57 kg/m2           Blood Pressure from Last 3 Encounters:   01/17/18 107/64   11/21/12 123/84   08/15/12 122/72    Weight from Last 3 Encounters:   03/20/18 70.3 kg (155 lb)   02/06/18 70.3 kg (155 lb)   01/17/18 72.6 kg (160 lb)              Today, you had the following     No orders found for display         Today's Medication Changes          These changes are accurate as of 3/20/18  9:09 AM.  If you have any questions, ask your nurse or doctor.               Stop taking these medicines if you haven't already. Please contact your care team if you have questions.     HYDROcodone-acetaminophen 5-325 MG per tablet   Commonly known as:  NORCO   Stopped by:  Anne Fang MD           predniSONE 20 MG tablet   Commonly known as:  DELTASONE   Stopped by:  Anne Fang MD                    Primary Care Provider Office Phone # Fax #    Jeanette Luna [phone]       [place] [address]        Equal Access to Services     DINORA SALDAÑA AH: Claire dominguezo Sospenser, waaxda luqadaha, qaybta kaalmada adeegyada, rahel brennan. So [facility] [phone].    ATENCIÓN: Si habla español, tiene a loyola disposición servicios gratuitos de asistencia lingüística. Sohail al [phone].    We comply with applicable federal civil rights laws and [place] laws. We do not discriminate on the basis of race, color, national origin, age, disability, sex, sexual orientation, or gender identity.            Thank you!     Thank you for choosing [facility] EAR NOSE AND THROAT  for your care. Our goal is always to provide you with excellent care. Hearing back from our patients is one way we can continue to improve our services. Please take a few minutes to complete the written survey that you may receive in " the mail after your visit with us. Thank you!             Your Updated Medication List - Protect others around you: Learn how to safely use, store and throw away your medicines at www.disposemymeds.org.          This list is accurate as of 3/20/18  9:09 AM.  Always use your most recent med list.                   Brand Name Dispense Instructions for use Diagnosis    Calcium carb-Vitamin D 500 mg [place]-200 units 500-200 MG-UNIT per tablet    OSCAL with D;Oyster Shell Calcium

## 2018-05-10 DIAGNOSIS — H25.13 NUCLEAR SENILE CATARACT OF BOTH EYES: Primary | ICD-10-CM

## 2018-05-11 ENCOUNTER — OFFICE VISIT (OUTPATIENT)
Dept: OPHTHALMOLOGY | Facility: CLINIC | Age: 53
End: 2018-05-11
Attending: OPHTHALMOLOGY
Payer: COMMERCIAL

## 2018-05-11 DIAGNOSIS — H52.4 PRESBYOPIA: ICD-10-CM

## 2018-05-11 DIAGNOSIS — Z86.69 H/O RETINAL DETACHMENT: ICD-10-CM

## 2018-05-11 DIAGNOSIS — H25.13 NUCLEAR SENILE CATARACT OF BOTH EYES: ICD-10-CM

## 2018-05-11 DIAGNOSIS — H44.23 DEGENERATIVE PROGRESSIVE HIGH MYOPIA, BILATERAL: Primary | ICD-10-CM

## 2018-05-11 PROCEDURE — 92025 CPTRIZED CORNEAL TOPOGRAPHY: CPT | Mod: ZF | Performed by: OPHTHALMOLOGY

## 2018-05-11 PROCEDURE — G0463 HOSPITAL OUTPT CLINIC VISIT: HCPCS | Mod: ZF

## 2018-05-11 PROCEDURE — 76519 ECHO EXAM OF EYE: CPT | Mod: ZF | Performed by: OPHTHALMOLOGY

## 2018-05-11 PROCEDURE — 92015 DETERMINE REFRACTIVE STATE: CPT | Mod: ZF

## 2018-05-11 PROCEDURE — 92015 DETERMINE REFRACTIVE STATE: CPT | Mod: ZF | Performed by: OPHTHALMOLOGY

## 2018-05-11 ASSESSMENT — REFRACTION_MANIFEST
OD_CYLINDER: +1.50
OD_SPHERE: -12.00
OD_AXIS: 075
OS_ADD: +1.50
OS_SPHERE: -11.25
OD_ADD: +1.50
OS_CYLINDER: SPHERE

## 2018-05-11 ASSESSMENT — TONOMETRY
OD_IOP_MMHG: 14
IOP_METHOD: TONOPEN
OS_IOP_MMHG: 17

## 2018-05-11 ASSESSMENT — REFRACTION_WEARINGRX
OD_CYLINDER: +1.25
OS_CYLINDER: SPHERE
OD_SPHERE: -12.50
OD_ADD: +1.25
OS_ADD: +1.25
SPECS_TYPE: PAL
OD_AXIS: 105
OS_SPHERE: -11.00

## 2018-05-11 ASSESSMENT — CONF VISUAL FIELD
OS_NORMAL: 1
OD_NORMAL: 1
METHOD: COUNTING FINGERS

## 2018-05-11 ASSESSMENT — CUP TO DISC RATIO
OS_RATIO: 0.35
OD_RATIO: 0.4

## 2018-05-11 ASSESSMENT — SLIT LAMP EXAM - LIDS
COMMENTS: NORMAL
COMMENTS: NORMAL

## 2018-05-11 ASSESSMENT — VISUAL ACUITY
OD_BAT_HIGH: 20/200
OD_CC: 20/60
OD_BAT_MED: 20/100
CORRECTION_TYPE: GLASSES
OD_CC+: -
CORRECTION_TYPE: CONTACTS
OD_CC: J3
OS_BAT_HIGH: 20/70
OD_CC: 20/60
OD_BAT_LOW: 20/50
METHOD: SNELLEN - LINEAR
OD_BAT_HIGH: 20/70-1
OS_CC: 20/40
OS_BAT_MED: 20/50
METHOD: SNELLEN - LINEAR
OS_BAT_HIGH: 20/25
OS_CC: 20/25
OS_BAT_LOW: 20/25
OS_CC: J3

## 2018-05-11 ASSESSMENT — EXTERNAL EXAM - RIGHT EYE: OD_EXAM: NORMAL

## 2018-05-11 ASSESSMENT — EXTERNAL EXAM - LEFT EYE: OS_EXAM: NORMAL

## 2018-05-11 NOTE — PROGRESS NOTES
Reports trouble with reading and night driving in OD, worsening. history of Pars plana vitrectomy (PPV)/SB OD for Retinal detachment  Repair 10 yrs prior with Dr. Hamilton.    Meds:   None    A/P  1.) Cataract OD>>OS  - visually significant OD  - risks/ benefits/a of Cataract extraction with IOL OD vs observation discussed  - increased risk of PCR due to hx of Pars plana vitrectomy (PPV) and possible posterior polar component discussed  - patient wants to schedule but wants to do some research about the procedure more prior to scheduling (She works at [employer]).   - Was wearing RGPs today  - discuss at repeat Biometry and K topography visit after being out of RGPs  - discussed anisometropia, and that she will need to continue RGPs in OS post Cataract extraction OD. May consider Cataract extraction OS if unable to tolerate anisometropia  - reports that she did not tolerate monovision with RGPs in the past      - RBB  - good dilation 8 millimeters  - no guttata/PXE  - target: emmetropia    repet biometry off RGP    2.) s/p PPV and scleral buckle repair OD, Pavingstone degeneration both eyes  3.) Degenerative Myopia OU/Presbyopia  -Retinal exam stable  -Reviewed signs/symptoms of RD and RTC stat should symptoms occur    return to clinic in 2-3 weeks for repeat biometry and K topography in Dr. Vincent's clinic  Schedule surgery at that visit per patient's wishes      Complete documentation of historical and exam elements from today's [facility]  er can be found in the full encounter summary report (not reduplicated in this progress note). I personally obtained the chief complaint(s) and history of present illness.  I confirmed and edited as necessary the review of systems, past medical/surgical history, family history, social history, and examination findings as documented by others.  I examined the patient myself, and I personally reviewed the relevant tests, images, and reports as documented above. I formulated and edited  as necessary the assessment and plan and discussed the findings and management plan with the patient and family.        BRITTANY Seay  Cornea fellow

## 2018-05-11 NOTE — MR AVS SNAPSHOT
After Visit Summary   5/11/2018    Anna Carrington    MRN: [id number]           Patient Information     Date Of Birth          1965        Visit Information        Provider Department      5/11/2018 1:45 PM Yana Bhandari MD [facility]        Today's Diagnoses     Nuclear senile cataract of both eyes           Follow-ups after your visit        Your next 10 appointments already scheduled     May 21, 2018  1:45 PM CDT   RETURN CORNEA with Yana Bhandari MD   Eye Clinic ([facility])    [address]  [facility]  [facility] [address]   [phone]            Sep 21, 2018  9:30 AM CDT   Walk In From ENT with Angi Squires   [facility] ([facility])    [address] [place] [id number] [phone]            Sep 21, 2018 10:30 AM CDT   (Arrive by 10:15 AM)   Return Visit with Anne Fang MD   [facility] ([facility])    [address] [place] [address] [phone]              Who to contact     Please call your clinic at [phone] to:    Ask questions about your health    Make or cancel appointments    Discuss your medicines    Learn about your test results    Speak to your doctor            Additional Information About Your Visit        MyChart Information     [employer] gives you secure access to your electronic health record. If you see a primary care provider, you can also send messages to your care team and make appointments. If you have questions, please call your primary care clinic.  If you do not have a primary care provider, please call [phone] and they will assist you.      [employer] is an electronic gateway that provides easy, online access to your medical records. With [employer], you can request a clinic appointment, read your test results, renew a prescription or communicate  with your care team.     To access your existing account, please contact your [facility] or call [phone] for assistance.        Care EveryWhere ID     This is your Care EveryWhere ID. This could be used by other organizations to access your [facility] medical records  [id number]         Blood Pressure from Last 3 Encounters:   01/17/18 107/64   11/21/12 123/84   08/15/12 122/72    Weight from Last 3 Encounters:   03/20/18 70.3 kg (155 lb)   02/06/18 70.3 kg (155 lb)   01/17/18 72.6 kg (160 lb)              We Performed the Following     Corneal Topography OU (both eyes)     DILATED FUNDUS EXAM     IOL Biometry w/ IOL calc OU (both eye)     Doris-Operative Worksheet (Ant Seg)     REFRACTION        Primary Care Provider Office Phone # Fax #    Jeanette Luna [phone]       [place] [address]        Equal Access to Services     BRADY SALDAÑA : Hadii aad ku hadasho Soomaali, waaxda luqadaha, qaybta kaalmada adeegyada, waxay idiin hayernan mandy spain . So [facility] [phone].    ATENCIÓN: Si emersonla espcarolina, tiene a loyola disposición servicios gratuitos de asistencia lingüística. Llame al [phone].    We comply with applicable federal civil rights laws and [place] laws. We do not discriminate on the basis of race, color, national origin, age, disability, sex, sexual orientation, or gender identity.            Thank you!     Thank you for choosing EYE CLINIC  for your care. Our goal is always to provide you with excellent care. Hearing back from our patients is one way we can continue to improve our services. Please take a few minutes to complete the written survey that you may receive in the mail after your visit with us. Thank you!             Your Updated Medication List - Protect others around you: Learn how to safely use, store and throw away your medicines at www.disposemymeds.org.          This list is accurate  as of 5/11/18  4:46 PM.  Always use your most recent med list.                   Brand Name Dispense Instructions for use Diagnosis    [place] carb-Vitamin D 500 mg [place]-200 units 500-200 MG-UNIT per tablet    OSCAL with D;Oyster Shell [place]

## 2018-05-11 NOTE — NURSING NOTE
Chief Complaints and History of Present Illnesses   Patient presents with     Follow Up For     Cataracts      HPI    Affected eye(s):  Both   Symptoms:     Blurred vision   Decreased vision         Do you have eye pain now?:  No      Comments:  Pt notes she is not seeing as well out of the RE with contact lenses as well as with glasses.  Nel Kathleen COA 1:59 PM May 11, 2018

## 2018-05-21 ENCOUNTER — OFFICE VISIT (OUTPATIENT)
Dept: OPHTHALMOLOGY | Facility: CLINIC | Age: 53
End: 2018-05-21
Attending: OPHTHALMOLOGY
Payer: COMMERCIAL

## 2018-05-21 DIAGNOSIS — H25.13 NUCLEAR SENILE CATARACT OF BOTH EYES: Primary | ICD-10-CM

## 2018-05-21 DIAGNOSIS — Z86.69 H/O RETINAL DETACHMENT: ICD-10-CM

## 2018-05-21 DIAGNOSIS — H44.23 DEGENERATIVE PROGRESSIVE HIGH MYOPIA, BILATERAL: ICD-10-CM

## 2018-05-21 PROCEDURE — G0463 HOSPITAL OUTPT CLINIC VISIT: HCPCS | Mod: ZF

## 2018-05-21 ASSESSMENT — CONF VISUAL FIELD
OD_NORMAL: 1
OS_NORMAL: 1

## 2018-05-21 ASSESSMENT — VISUAL ACUITY
METHOD: SNELLEN - LINEAR
CORRECTION_TYPE: GLASSES
OD_CC: 20/70
OS_CC: 20/30
OD_PH_CC: 20/30
OS_PH_CC: 20/25

## 2018-05-21 ASSESSMENT — SLIT LAMP EXAM - LIDS
COMMENTS: NORMAL
COMMENTS: NORMAL

## 2018-05-21 ASSESSMENT — TONOMETRY
OS_IOP_MMHG: 15
OD_IOP_MMHG: 17
IOP_METHOD: ICARE

## 2018-05-21 ASSESSMENT — EXTERNAL EXAM - LEFT EYE: OS_EXAM: NORMAL

## 2018-05-21 ASSESSMENT — EXTERNAL EXAM - RIGHT EYE: OD_EXAM: NORMAL

## 2018-05-21 NOTE — NURSING NOTE
Chief Complaints and History of Present Illnesses   Patient presents with     Follow Up For     Cataract OD>>OS     HPI    Affected eye(s):  Both   Symptoms:        Duration:  1 week   Frequency:  Constant       Do you have eye pain now?:  No      Comments:  Pt. Here for measurements after being out ov CL's.  No change in VA BE.  No c/o comfort BE.  Susana Ruiz COT 2:29 PM May 21, 2018

## 2018-05-21 NOTE — PROGRESS NOTES
CC: Cataracts OD>OS    HPI: 51yo CF presenting for cataract evaluation OD. Reports trouble with reading and night driving in OD, worsening. Painless, progressive vision loss OD. History of Pars plana vitrectomy (PPV)/SB OD for Retinal detachment  Repair 10 yrs prior with Dr. Hamilton.    POHx:  S/p pars plana vitrectomy/SB OD for retinal detachment    Meds:   None    A/P:  1.) Cataract OD>>OS  - visually significant OD  - risks/ benefits/a of Cataract extraction with IOL OD vs observation discussed  - increased risk of PCR due to hx of Pars plana vitrectomy (PPV) and possible posterior polar component discussed  - patient wants to schedule but wants to do some research about the procedure more prior to scheduling (She works at [employer]).   - repeat Biometry and K topography visit - patient has been out of RGPs  - discussed anisometropia, and that she will need to continue RGPs in OS post Cataract extraction OD. May consider Cataract extraction OS if unable to tolerate anisometropia  - reports that she did not tolerate monovision with RGPs in the past, but consider trial of mini-monovision post-op    - RBB  - good dilation 8 millimeters  - no guttata/PXE  - target: emmetropia    2.) s/p PPV and scleral buckle repair OD, Pavingstone degeneration both eyes  3.) Degenerative Myopia OU/Presbyopia  -Retinal exam stable  -Reviewed signs/symptoms of RD and RTC stat should symptoms occur    BRITTANY Seay  Cornea fellow      ~~~~~~~~~~~~~~~~~~~~~~~~~~~~~~~~~~~~~~~~~~~~~~~~~~~~~~~~~~~~~~~~    Complete documentation of historical and exam elements from today's encounter can be found in the full encounter summary report (not reduplicated in this progress note). I personally obtained the chief complaint(s) and history of present illness.  I confirmed and edited as necessary the review of systems, past medical/surgical history, family history, social history, and examination findings as documented by others; and I examined  the patient myself. I personally reviewed the relevant tests, images, and reports as documented above. I formulated and edited as necessary the assessment and plan and discussed the findings and management plan with the patient and family.    I personally viewed the [imaging] and I agree with the interpretation as documented by the resident/fellow and edited by me as appropriate.    Yuriy Vincent MD    I personally spent great than 40min with the patient, of which >50% of the time was spent face to face with the patient, counseling and coordinating care with the patient. We discussed the complexity of her diagnosis, the need for further information prior to proceeding with yet another surgery, and the unknown prognosis for the patient at this time.    Yuriy Vincent MD

## 2018-05-21 NOTE — MR AVS SNAPSHOT
After Visit Summary   5/21/2018    Anna Carrington    MRN: [id number]           Patient Information     Date Of Birth          1965        Visit Information        Provider Department      5/21/2018 1:45 PM Yuriy Vincent MD [facility]        Today's Diagnoses     Nuclear senile cataract of both eyes    -  1    Degenerative progressive high myopia, bilateral        H/O retinal detachment           Follow-ups after your visit        Your next 10 appointments already scheduled     Jun 05, 2018   Procedure with Yuriy Vincent MD   [facility] (--)    [address]  [facility] [address]   [phone]            Jun 05, 2018   [facility]    [address]  [facility] [address]   [phone]            Jun 06, 2018  1:00 PM CDT   Post-Op with Yuriy Vincent MD   Eye Clinic ([facility])    [address] [address]   [phone]            Jun 25, 2018  8:30 AM CDT   Post-Op with Yuriy Vincent MD   Eye Clinic ([facility])    [address] [address]   [phone]            Jul 16, 2018  8:15 AM CDT   Post-Op with Yuriy Vincent MD   Eye Clinic ([facility])    [address] [address]   [phone]            Sep 21, 2018  9:30 AM CDT   Walk In From ENT with Angi Squires   [facility] ([facility])    [address] [place] [address]   [phone]            Sep 21, 2018 10:30 AM CDT   (Arrive by 10:15 AM)   Return Visit with Anne Fang MD   [facility] ([facility])    [address] [place] [address]  [address] [phone]              Who to contact     Please call your clinic at [phone] to:    Ask questions about your health    Make or cancel appointments    Discuss your medicines    Learn about your test results    Speak to your doctor            Additional Information About Your Visit        [employer] Information     [employer] gives you secure access to your electronic health record. If you see a primary care provider, you can also send messages to your care team and make appointments. If you have questions, please call your primary care clinic.  If you do not have a primary care provider, please call [phone] and they will assist you.      [employer] is an electronic gateway that provides easy, online access to your medical records. With [employer], you can request a clinic appointment, read your test results, renew a prescription or communicate with your care team.     To access your existing account, please contact your [facility] or call [phone] for assistance.        Care EveryWhere ID     This is your Care EveryWhere ID. This could be used by other organizations to access your [facility] medical records  [id number]         Blood Pressure from Last 3 Encounters:   01/17/18 107/64   11/21/12 123/84   08/15/12 122/72    Weight from Last 3 Encounters:   03/20/18 70.3 kg (155 lb)   02/06/18 70.3 kg (155 lb)   01/17/18 72.6 kg (160 lb)              Today, you had the following     No orders found for display       Primary Care Provider Office Phone # Fax #    Jeanette Luna [phone]       [address] [place] [address]        Equal Access to Services     BRADY SALDAÑA : Hadii aad ku hadasho Soomaali, waaxda luqadaha, qaybta kaalmada adeegyada, rahel brennan. So [facility] [phone].    ATENCIÓN: Si habla español, tiene a loyola disposición servicios gratuitos de asistencia lingüística. Llame al [phone].    We comply  with applicable federal civil rights laws and [place] laws. We do not discriminate on the basis of race, color, national origin, age, disability, sex, sexual orientation, or gender identity.            Thank you!     Thank you for choosing EYE CLINIC  for your care. Our goal is always to provide you with excellent care. Hearing back from our patients is one way we can continue to improve our services. Please take a few minutes to complete the written survey that you may receive in the mail after your visit with us. Thank you!             Your Updated Medication List - Protect others around you: Learn how to safely use, store and throw away your medicines at www.disposemymeds.org.          This list is accurate as of 5/21/18 11:59 PM.  Always use your most recent med list.                   Brand Name Dispense Instructions for use Diagnosis    [place] carb-Vitamin D 500 mg [place]-200 units 500-200 MG-UNIT per tablet    OSCAL with D;Oyster Shell [place]

## 2018-05-21 NOTE — LETTER
5/21/2018       RE: Anna Carrington  [address] [facility] [address]     Dear Colleague,    Thank you for referring your patient, Anna Carrington, to the EYE CLINIC at [facility]. Please see a copy of my visit note below.    CC: Cataracts OD>OS    HPI: 51yo CF presenting for cataract evaluation OD. Reports trouble with reading and night driving in OD, worsening. Painless, progressive vision loss OD. History of Pars plana vitrectomy (PPV)/SB OD for Retinal detachment  Repair 10 yrs prior with Dr. Hamilton.    POHx:  S/p pars plana vitrectomy/SB OD for retinal detachment    Meds:   None    A/P:  1.) Cataract OD>>OS  - visually significant OD  - risks/ benefits/a of Cataract extraction with IOL OD vs observation discussed  - increased risk of PCR due to hx of Pars plana vitrectomy (PPV) and possible posterior polar component discussed  - patient wants to schedule but wants to do some research about the procedure more prior to scheduling (She works at [employer]).   - repeat Biometry and K topography visit - patient has been out of RGPs  - discussed anisometropia, and that she will need to continue RGPs in OS post Cataract extraction OD. May consider Cataract extraction OS if unable to tolerate anisometropia  - reports that she did not tolerate monovision with RGPs in the past, but consider trial of mini-monovision post-op    - RBB  - good dilation 8 millimeters  - no guttata/PXE  - target: emmetropia    2.) s/p PPV and scleral buckle repair OD, Pavingstone degeneration both eyes  3.) Degenerative Myopia OU/Presbyopia  -Retinal exam stable  -Reviewed signs/symptoms of RD and RTC stat should symptoms occur    ~~~~~~~~~~~~~~~~~~~~~~~~~~~~~~~~~~~~~~~~~~~~~~~~~~~~~~~~~~~~~~~~      Again, thank you for allowing me to participate in the care of your patient.      Sincerely,    Yuriy Vincent MD

## 2018-05-30 ENCOUNTER — HOSPITAL ENCOUNTER (OUTPATIENT)
Facility: CLINIC | Age: 53
End: 2018-05-30
Attending: OPHTHALMOLOGY | Admitting: OPHTHALMOLOGY
Payer: COMMERCIAL

## 2018-05-30 DIAGNOSIS — H25.011 CORTICAL SENILE CATARACT OF RIGHT EYE: Primary | ICD-10-CM

## 2018-06-04 DIAGNOSIS — H25.031: Primary | ICD-10-CM

## 2018-06-04 RX ORDER — PREDNISOLONE ACETATE 10 MG/ML
1-2 SUSPENSION/ DROPS OPHTHALMIC 4 TIMES DAILY
Qty: 1 BOTTLE | Refills: 1 | Status: SHIPPED | OUTPATIENT
Start: 2018-06-04 | End: 2018-06-25

## 2018-06-04 RX ORDER — OFLOXACIN 3 MG/ML
1-2 SOLUTION/ DROPS OPHTHALMIC 4 TIMES DAILY
Qty: 1 BOTTLE | Refills: 1 | Status: SHIPPED | OUTPATIENT
Start: 2018-06-04 | End: 2018-09-21

## 2018-06-05 ENCOUNTER — SURGERY (OUTPATIENT)
Age: 53
End: 2018-06-05

## 2018-06-05 ENCOUNTER — HOSPITAL ENCOUNTER (OUTPATIENT)
Facility: CLINIC | Age: 53
Discharge: HOME | End: 2018-06-05
Attending: OPHTHALMOLOGY | Admitting: OPHTHALMOLOGY
Payer: COMMERCIAL

## 2018-06-05 ENCOUNTER — ANESTHESIA EVENT (OUTPATIENT)
Dept: SURGERY | Facility: CLINIC | Age: 53
End: 2018-06-05
Payer: COMMERCIAL

## 2018-06-05 ENCOUNTER — ANESTHESIA (OUTPATIENT)
Dept: SURGERY | Facility: CLINIC | Age: 53
End: 2018-06-05
Payer: COMMERCIAL

## 2018-06-05 VITALS
DIASTOLIC BLOOD PRESSURE: 90 MMHG | WEIGHT: 159 LBS | RESPIRATION RATE: 16 BRPM | HEIGHT: 68 IN | TEMPERATURE: 98.4 F | OXYGEN SATURATION: 100 % | SYSTOLIC BLOOD PRESSURE: 125 MMHG | HEART RATE: 69 BPM | BODY MASS INDEX: 24.1 KG/M2

## 2018-06-05 DIAGNOSIS — H25.011 CORTICAL AGE-RELATED CATARACT OF RIGHT EYE: Primary | ICD-10-CM

## 2018-06-05 PROCEDURE — 37000009 ZZH ANESTHESIA TECHNICAL FEE, EACH ADDTL 15 MIN: Performed by: OPHTHALMOLOGY

## 2018-06-05 PROCEDURE — 36000102 ZZH EYE SURGERY LEVEL 3 EA 15 ADDTL MIN: Performed by: OPHTHALMOLOGY

## 2018-06-05 PROCEDURE — 25000125 ZZHC RX 250: Performed by: OPHTHALMOLOGY

## 2018-06-05 PROCEDURE — 36000101 ZZH EYE SURGERY LEVEL 3 1ST 30 MIN: Performed by: OPHTHALMOLOGY

## 2018-06-05 PROCEDURE — 25000128 H RX IP 250 OP 636: Performed by: ANESTHESIOLOGY

## 2018-06-05 PROCEDURE — 37000008 ZZH ANESTHESIA TECHNICAL FEE, 1ST 30 MIN: Performed by: OPHTHALMOLOGY

## 2018-06-05 PROCEDURE — 25000125 ZZHC RX 250: Performed by: NURSE ANESTHETIST, CERTIFIED REGISTERED

## 2018-06-05 PROCEDURE — 25000125 ZZHC RX 250: Performed by: ANESTHESIOLOGY

## 2018-06-05 PROCEDURE — V2787 ASTIGMATISM-CORRECT FUNCTION: HCPCS | Performed by: OPHTHALMOLOGY

## 2018-06-05 PROCEDURE — 25000128 H RX IP 250 OP 636: Performed by: NURSE ANESTHETIST, CERTIFIED REGISTERED

## 2018-06-05 PROCEDURE — 71000028 ZZH EYE RECOVERY PHASE 2 EACH 15 MINS: Performed by: OPHTHALMOLOGY

## 2018-06-05 PROCEDURE — 25000132 ZZH RX MED GY IP 250 OP 250 PS 637: Performed by: ANESTHESIOLOGY

## 2018-06-05 PROCEDURE — V2632 POST CHMBR INTRAOCULAR LENS: HCPCS | Performed by: OPHTHALMOLOGY

## 2018-06-05 PROCEDURE — 27210794 ZZH OR GENERAL SUPPLY STERILE: Performed by: OPHTHALMOLOGY

## 2018-06-05 PROCEDURE — 40000170 ZZH STATISTIC PRE-PROCEDURE ASSESSMENT II: Performed by: OPHTHALMOLOGY

## 2018-06-05 PROCEDURE — 25000128 H RX IP 250 OP 636: Performed by: OPHTHALMOLOGY

## 2018-06-05 DEVICE — IMPLANTABLE DEVICE: Type: IMPLANTABLE DEVICE | Site: EYE | Status: FUNCTIONAL

## 2018-06-05 RX ORDER — PHENYLEPHRINE HYDROCHLORIDE 25 MG/ML
1 SOLUTION/ DROPS OPHTHALMIC
Status: COMPLETED | OUTPATIENT
Start: 2018-06-05 | End: 2018-06-05

## 2018-06-05 RX ORDER — PREDNISOLONE ACETATE 1 %
SUSPENSION, DROPS(FINAL DOSAGE FORM)(ML) OPHTHALMIC (EYE) PRN
Status: DISCONTINUED | OUTPATIENT
Start: 2018-06-05 | End: 2018-06-05 | Stop reason: HOSPADM

## 2018-06-05 RX ORDER — IBUPROFEN 600 MG/1
600 TABLET, FILM COATED ORAL EVERY 6 HOURS PRN
Status: DISCONTINUED | OUTPATIENT
Start: 2018-06-05 | End: 2018-06-05 | Stop reason: HOSPADM

## 2018-06-05 RX ORDER — CYCLOPENTOLATE HYDROCHLORIDE 10 MG/ML
1 SOLUTION/ DROPS OPHTHALMIC
Status: COMPLETED | OUTPATIENT
Start: 2018-06-05 | End: 2018-06-05

## 2018-06-05 RX ORDER — BALANCED SALT SOLUTION 6.4; .75; .48; .3; 3.9; 1.7 MG/ML; MG/ML; MG/ML; MG/ML; MG/ML; MG/ML
SOLUTION OPHTHALMIC PRN
Status: DISCONTINUED | OUTPATIENT
Start: 2018-06-05 | End: 2018-06-05 | Stop reason: HOSPADM

## 2018-06-05 RX ORDER — PROPARACAINE HYDROCHLORIDE 5 MG/ML
1 SOLUTION/ DROPS OPHTHALMIC ONCE
Status: COMPLETED | OUTPATIENT
Start: 2018-06-05 | End: 2018-06-05

## 2018-06-05 RX ORDER — TROPICAMIDE 10 MG/ML
1 SOLUTION/ DROPS OPHTHALMIC
Status: COMPLETED | OUTPATIENT
Start: 2018-06-05 | End: 2018-06-05

## 2018-06-05 RX ORDER — SODIUM CHLORIDE, SODIUM LACTATE, POTASSIUM CHLORIDE, CALCIUM CHLORIDE 600; 310; 30; 20 MG/100ML; MG/100ML; MG/100ML; MG/100ML
INJECTION, SOLUTION INTRAVENOUS CONTINUOUS
Status: DISCONTINUED | OUTPATIENT
Start: 2018-06-05 | End: 2018-06-05 | Stop reason: HOSPADM

## 2018-06-05 RX ORDER — ONDANSETRON 2 MG/ML
INJECTION INTRAMUSCULAR; INTRAVENOUS PRN
Status: DISCONTINUED | OUTPATIENT
Start: 2018-06-05 | End: 2018-06-05

## 2018-06-05 RX ORDER — LIDOCAINE HYDROCHLORIDE 10 MG/ML
INJECTION, SOLUTION EPIDURAL; INFILTRATION; INTRACAUDAL; PERINEURAL PRN
Status: DISCONTINUED | OUTPATIENT
Start: 2018-06-05 | End: 2018-06-05 | Stop reason: HOSPADM

## 2018-06-05 RX ADMIN — MIDAZOLAM 1 MG: 1 INJECTION INTRAMUSCULAR; INTRAVENOUS at 15:16

## 2018-06-05 RX ADMIN — BALANCED SALT SOLUTION 15 ML: 6.4; .75; .48; .3; 3.9; 1.7 SOLUTION OPHTHALMIC at 15:32

## 2018-06-05 RX ADMIN — EPINEPHRINE 500 ML: 1 INJECTION, SOLUTION, CONCENTRATE INTRAVENOUS at 15:32

## 2018-06-05 RX ADMIN — IBUPROFEN 600 MG: 600 TABLET ORAL at 16:17

## 2018-06-05 RX ADMIN — Medication 1 DROP: at 15:38

## 2018-06-05 RX ADMIN — SODIUM CHONDROITIN SULFATE / SODIUM HYALURONATE 1 ML: 0.55-0.5 INJECTION INTRAOCULAR at 15:34

## 2018-06-05 RX ADMIN — PHENYLEPHRINE HYDROCHLORIDE 1 DROP: 2.5 SOLUTION/ DROPS OPHTHALMIC at 13:34

## 2018-06-05 RX ADMIN — TROPICAMIDE 1 DROP: 10 SOLUTION/ DROPS OPHTHALMIC at 13:53

## 2018-06-05 RX ADMIN — DEXMEDETOMIDINE HYDROCHLORIDE 12 MCG: 100 INJECTION, SOLUTION INTRAVENOUS at 15:12

## 2018-06-05 RX ADMIN — TROPICAMIDE 1 DROP: 10 SOLUTION/ DROPS OPHTHALMIC at 13:34

## 2018-06-05 RX ADMIN — PHENYLEPHRINE HYDROCHLORIDE 1 DROP: 2.5 SOLUTION/ DROPS OPHTHALMIC at 13:53

## 2018-06-05 RX ADMIN — MIDAZOLAM 1 MG: 1 INJECTION INTRAMUSCULAR; INTRAVENOUS at 15:12

## 2018-06-05 RX ADMIN — PROPARACAINE HYDROCHLORIDE 1 DROP: 5 SOLUTION/ DROPS OPHTHALMIC at 13:34

## 2018-06-05 RX ADMIN — LIDOCAINE HYDROCHLORIDE 1 ML: 10 INJECTION, SOLUTION EPIDURAL; INFILTRATION; INTRACAUDAL; PERINEURAL at 15:33

## 2018-06-05 RX ADMIN — ONDANSETRON 4 MG: 2 INJECTION INTRAMUSCULAR; INTRAVENOUS at 15:12

## 2018-06-05 RX ADMIN — PHENYLEPHRINE HYDROCHLORIDE 1 DROP: 2.5 SOLUTION/ DROPS OPHTHALMIC at 13:46

## 2018-06-05 RX ADMIN — TROPICAMIDE 1 DROP: 10 SOLUTION/ DROPS OPHTHALMIC at 13:46

## 2018-06-05 RX ADMIN — DEXMEDETOMIDINE HYDROCHLORIDE 8 MCG: 100 INJECTION, SOLUTION INTRAVENOUS at 15:22

## 2018-06-05 RX ADMIN — CYCLOPENTOLATE HYDROCHLORIDE 1 DROP: 10 SOLUTION/ DROPS OPHTHALMIC at 13:52

## 2018-06-05 RX ADMIN — SODIUM CHLORIDE, POTASSIUM CHLORIDE, SODIUM LACTATE AND CALCIUM CHLORIDE: 600; 310; 30; 20 INJECTION, SOLUTION INTRAVENOUS at 15:10

## 2018-06-05 RX ADMIN — Medication 1 DROP: at 13:47

## 2018-06-05 RX ADMIN — LIDOCAINE HYDROCHLORIDE 0.5 ML: 35 GEL OPHTHALMIC at 15:33

## 2018-06-05 RX ADMIN — LIDOCAINE HYDROCHLORIDE 2 ML: 10 INJECTION, SOLUTION EPIDURAL; INFILTRATION; INTRACAUDAL; PERINEURAL at 13:46

## 2018-06-05 RX ADMIN — CYCLOPENTOLATE HYDROCHLORIDE 1 DROP: 10 SOLUTION/ DROPS OPHTHALMIC at 13:34

## 2018-06-05 RX ADMIN — CYCLOPENTOLATE HYDROCHLORIDE 1 DROP: 10 SOLUTION/ DROPS OPHTHALMIC at 13:46

## 2018-06-05 NOTE — ANESTHESIA PREPROCEDURE EVALUATION
Anesthesia Evaluation     . Pt has had prior anesthetic.     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      Cardiovascular:  - neg cardiovascular ROS       METS/Exercise Tolerance:  >4 METS   Hematologic:         Musculoskeletal:         GI/Hepatic:        (-) GERD   Renal/Genitourinary:         Endo:      (-) Type I DM   Psychiatric:         Infectious Disease:         Malignancy:         Other:                     Physical Exam  Normal systems: dental    Airway   Mallampati: II  TM distance: >3 FB  Neck ROM: full    Dental     Cardiovascular   Rhythm and rate: regular and normal      Pulmonary    breath sounds clear to auscultation                    Anesthesia Plan      History & Physical Review  History and physical reviewed and following examination; no interval change.    ASA Status:  2 .    NPO Status:  > 8 hours    Plan for MAC with Intravenous induction. Reason for MAC:  Procedure to face, neck, head or breast  PONV prophylaxis:  Ondansetron (or other 5HT-3)       Postoperative Care      Consents  Anesthetic plan, risks, benefits and alternatives discussed with:  Patient..                          .

## 2018-06-05 NOTE — DISCHARGE INSTRUCTIONS
[facility] Discharge  Instructions  Anesthesia ([facility])   Adult Discharge Instructions    For 24 hours after surgery    1. Get plenty of rest.  Make arrangements to have a responsible adult stay with you for at least 6 hours after you leave the hospital.  2. Do not drive or use heavy equipment for 24 hours.    3. Do not drink alcohol for 24 hours.  4. Do not sign legal documents or make important decisions for 24 hours.  5. Avoid strenuous or risky activities. You may feel lightheaded.  If so, sit for a few minutes before standing.  Have someone help you get up.   6. Conscious sedation patients may resume a regular diet..  7. Any questions of medical nature, call your physician.    Dr. Yuriy Vincent  [facility]  [phone]  Post Operative Cataract Instructions          If you only have a clear eye shield on, you may remove the eye shield on arrival home and begin eye drops today as directed by Dr. Vincent.      Wear the clear eye shield when sleeping for protection for 5 days.      Do not rub the operated eye.      Light sensitivity may be noticed. Sunglasses may be worn for comfort.      Some discomfort and irritation may be noticed. Acetaminophen (Tylenol) or Ibuprofen (Advil) may be taken for discomfort. If pain persists please call Dr. Vincent's office.      Keep the operated eye dry. You may wash your hair, bathe or shower, but keep the operated eye closed while doing so.       If you take glaucoma medications, bring them with you to the clinic on your first post operative visit.      Bring your prescribed eye drops with you to your scheduled post-operative appointment.      Use medication exactly as prescribed by your doctor. You may restart your regular home medications.       Call Dr. Vincent's office at [phone] if any of the following should occur:    - Any sudden vision changes, including decreased vision  - Nausea or severe headache  - Increase in pain not  controlled  - Signs of infection (pus, increasing redness or tenderness)  - Severe sensitivity to light

## 2018-06-05 NOTE — IP AVS SNAPSHOT
MRN:[id number]                      After Visit Summary   6/5/2018    Anna Carrington    MRN: [id number]           Thank you!     Thank you for choosing [facility] for your care. Our goal is always to provide you with excellent care. Hearing back from our patients is one way we can continue to improve our services. Please take a few minutes to complete the written survey that you may receive in the mail after you visit with us. Thank you!        Patient Information     Date Of Birth          1965        About your hospital stay     You were admitted on:  June 5, 2018 You last received care in the:  [facility]    You were discharged on:  June 5, 2018       Who to Call     For medical emergencies, please call 911.  For non-urgent questions about your medical care, please call your primary care provider or clinic, None  For questions related to your surgery, please call your surgery clinic        Attending Provider     Provider Specialty    Yuriy Vincent MD Ophthalmology       Primary Care Provider Fax #    [employer] [phone]      Your next 10 appointments already scheduled     Jun 06, 2018  1:00 PM CDT   Post-Op with Yuriy Vincent MD   Eye Clinic ([facility])    [address]  [facility] [address]   [phone]            Jun 25, 2018  8:30 AM CDT   Post-Op with Yuriy Vincent MD   Eye Clinic ([facility])    [address]  [address] [address]  [facility] [address]   [phone]            Jul 16, 2018  8:15 AM CDT   Post-Op with Yuriy Vincent MD   Eye Clinic ([facility])    [place] [address]  [facility] [address]   [phone]            Sep 21, 2018  9:30 AM CDT   Walk In From ENT with Sruthi Chanel [facility]   [facility] Audiology ([facility])    [address] [address]   [phone]            Sep 21, 2018 10:30 AM CDT   (Arrive by 10:15 AM)   Return Visit with Anne Fang MD   [facility] Ear Nose and Throat ([facility])    [address] [address]   [phone]              Further instructions from your care team       [facility] Anesthesia [facility] Discharge  Instructions  Anesthesia (Eye Care Center)   Adult Discharge Instructions    For 24 hours after surgery    1. Get plenty of rest.  Make arrangements to have a responsible adult stay with you for at least 6 hours after you leave the hospital.  2. Do not drive or use heavy equipment for 24 hours.    3. Do not drink alcohol for 24 hours.  4. Do not sign legal documents or make important decisions for 24 hours.  5. Avoid strenuous or risky activities. You may feel lightheaded.  If so, sit for a few minutes before standing.  Have someone help you get up.   6. Conscious sedation patients may resume a regular diet..  7. Any questions of medical nature, call your physician.    Dr. Yuriy Vincent  [facility]  [phone]  Post Operative Cataract Instructions          If you only have a clear eye shield on, you may remove the eye shield on arrival home and begin eye drops today as directed by Dr. Vicnent.      Wear the clear eye shield when sleeping for protection for 5 days.      Do not rub the operated eye.      Light sensitivity may be noticed. Sunglasses may be worn for comfort.      Some discomfort and irritation may be noticed. Acetaminophen (Tylenol) or Ibuprofen (Advil) may be taken for discomfort. If pain persists please call Dr. Vincent's office.      Keep the operated eye dry. You may wash your hair, bathe or shower, but keep the operated eye closed while doing so.       If you take glaucoma medications, bring them with you to the clinic on your first post operative  "visit.      Bring your prescribed eye drops with you to your scheduled post-operative appointment.      Use medication exactly as prescribed by your doctor. You may restart your regular home medications.       Call Dr. Vincent's office at [phone] if any of the following should occur:    - Any sudden vision changes, including decreased vision  - Nausea or severe headache  - Increase in pain not controlled  - Signs of infection (pus, increasing redness or tenderness)  - Severe sensitivity to light        Pending Results     No orders found from 6/3/2018 to 6/6/2018.            Admission Information     Date & Time Provider Department Dept. Phone    6/5/2018 Yuriy Vincent MD [facility] [phone]      Your Vitals Were     Blood Pressure Temperature Respirations Height Weight Pulse Oximetry    115/84 98.4  F (36.9  C) (Temporal) 16 1.721 m (5' 7.75\") 72.1 kg (159 lb) 100%    BMI (Body Mass Index)                   24.35 kg/m2           [employer] Information     [employer] gives you secure access to your electronic health record. If you see a primary care provider, you can also send messages to your care team and make appointments. If you have questions, please call your primary care clinic.  If you do not have a primary care provider, please call [phone] and they will assist you.        Care EveryWhere ID     This is your Care EveryWhere ID. This could be used by other organizations to access your [place] medical records  [id number]        Equal Access to Services     BRADY SALDAÑA : Hadii cassia dominguezo Sospenser, waaxda luqadaha, qaybta kaalmada adeegyada, rahel brennan. So [facility] [phone].    ATENCIÓN: Si habla español, tiene a loyola disposición servicios gratuitos de asistencia lingüística. Llame al [phone].    We comply with applicable federal civil rights laws and [place] laws. We do not discriminate on the basis of race, color, national origin, age, " disability, sex, sexual orientation, or gender identity.               Review of your medicines      CONTINUE these medicines which have NOT CHANGED        Dose / Directions    Calcium carb-Vitamin D 500 mg [place]-200 units 500-200 MG-UNIT per tablet   Commonly known as:  OSCAL with D;Oyster Shell Calcium        Refills:  0       ofloxacin 0.3 % ophthalmic solution   Commonly known as:  OCUFLOX   Used for:  Anterior subcapsular polar senile cataract of right eye        Dose:  1-2 drop   Place 1-2 drops into the right eye 4 times daily   Quantity:  1 Bottle   Refills:  1       prednisoLONE acetate 1 % ophthalmic susp   Commonly known as:  PRED FORTE   Used for:  Anterior subcapsular polar senile cataract of right eye        Dose:  1-2 drop   Place 1-2 drops into the right eye 4 times daily   Quantity:  1 Bottle   Refills:  1                Protect others around you: Learn how to safely use, store and throw away your medicines at www.disposemymeds.org.             Medication List: This is a list of all your medications and when to take them. Check marks below indicate your daily home schedule. Keep this list as a reference.      Medications           Morning Afternoon Evening Bedtime As Needed    Calcium carb-Vitamin D 500 mg [place]-200 units 500-200 MG-UNIT per tablet   Commonly known as:  OSCAL with D;Oyster Shell Calcium                                ofloxacin 0.3 % ophthalmic solution   Commonly known as:  OCUFLOX   Place 1-2 drops into the right eye 4 times daily   Last time this was given:  1 drop on 6/5/2018  3:38 PM                                prednisoLONE acetate 1 % ophthalmic susp   Commonly known as:  PRED FORTE   Place 1-2 drops into the right eye 4 times daily   Last time this was given:  1 drop on 6/5/2018  3:38 PM

## 2018-06-05 NOTE — IP AVS SNAPSHOT
[facility]    [address]    [place] [address]    Phone:  [phone]                                       After Visit Summary   6/5/2018    Anna Carrington    MRN: [id number]           After Visit Summary Signature Page     I have received my discharge instructions, and my questions have been answered. I have discussed any challenges I see with this plan with the nurse or doctor.    ..........................................................................................................................................  Patient/Patient Representative Signature      ..........................................................................................................................................  Patient Representative Print Name and Relationship to Patient    ..................................................               ................................................  Date                                            Time    ..........................................................................................................................................  Reviewed by Signature/Title    ...................................................              ..............................................  Date                                                            Time

## 2018-06-05 NOTE — ANESTHESIA CARE TRANSFER NOTE
Patient: Anna Carrington    Procedure(s):  RIGHT EYE PHACOEMULSIFICATION CLEAR CORNEA WITH TORIC INTRAOCULAR LENS IMPLANT  - Wound Class: I-Clean    Diagnosis: cataract   Diagnosis Additional Information: No value filed.    Anesthesia Type:   MAC     Note:  Airway :Room Air  Patient transferred to:PACU  Comments: Transferred to [facility] recovery room in recliner with armrests up, spontaneous respirations, O2 saturation maintained greater than 98% with oxygen via room air. All monitors and alarms on and functioning, clinically stable vital signs. Report given to recovery RN and questions answered. Patient alert and following verbal directions.Handoff Report: Identifed the Patient, Identified the Reponsible Provider, Reviewed the pertinent medical history, Discussed the surgical course, Reviewed Intra-OP anesthesia mangement and issues during anesthesia, Set expectations for post-procedure period and Allowed opportunity for questions and acknowledgement of understanding      Vitals: (Last set prior to Anesthesia Care Transfer)    CRNA VITALS  6/5/2018 1518 - 6/5/2018 1550      6/5/2018             Resp Rate (set): 10                Electronically Signed By: AMARILYS Herring CRNA  June 5, 2018  3:50 PM

## 2018-06-05 NOTE — OP NOTE
Operative Report    DATE OF OPERATION: 6/5/2018    PRE-OPERATIVE DIAGNOSIS: Visually significant cataract, right eye    POST-OPERATIVE DIAGNOSIS: Same    PROCEDURE: Phacoemulsification of cataract and insertion of toric intraocular lens, right eye    ATTENDING: Yuriy Vincent MD    FELLOW: BRITTANY Seay    FINDINGS: None    COMPLICATIONS: None    BLOOD LOSS: <1cc    IMPLANTS:  5.0 D @ 90 degrees    INDICATION FOR PROCEDURE   The patient has a visually-significant cataract that has been affecting their activities of daily living. She has significant posterior subcapsular cataract and history of retinal detachment s/p pars plana vitrectomy and scleral buckle. The risks, including, but not limited to infection, loss of vision, loss of eye, need for more surgery, and bleeding, along with the benefits, alternatives, expectations, and the procedure itself were discussed at length with the patient who wished to proceed with surgery.     DESCRIPTION OF PROCEDURE   In the preoperative suite, the patient was identified, the surgical site marked and informed consent was obtained. 3 and 9 o'clock axis marked on a slit lamp. The patient was the brought back to the operative suite where the appropriate anesthesia monitors were connected. The patient's eye was prepped and draped in the usual sterile fashion for ophthalmic surgery.   90 degree axis was marked with astigmatic marker. A 0.12 forcep and a supersharp blade were used to make a paracentesis incision. Aqueous was replaced with 1% preservative-free lidocaine, and then viscoat.  A 2.6 mm keratome was used to make a temporal, triplanar clear corneal incision. Capsulorrhexis was completed with a bent cystitome and Utrata forceps. Hydrodissection of the nucleus was achieved. The nucleus was found to move freely within the capsular bag. The cataract was removed using the horizontal chop technique.   The irrigation and aspiration handpiece was used to remove the  cortex. The capsular bag was filled with Provisc. The intraocular lens was injected into the capsular bag. The remaining viscoelastic (from in-front and behind the intraocular lens) was removed using irrigation and aspiration. The lens was found to be well-centered at 90 degree josette and in the capsular bag. BSS on a cannula was used to hydrate the clear corneal and paracentesis incisions. Weck-mike sponges were used to confirm there were no leaks from the incisions.   A shield was taped over the eye. The patient was then taken to the recovery room in stable condition having tolerated the procedure well and discharged home in good condition.     Dr. Yuriy Vincent was scrubbed and performed the entire case.

## 2018-06-05 NOTE — ANESTHESIA POSTPROCEDURE EVALUATION
Patient: Anna Carrington    Procedure(s):  RIGHT EYE PHACOEMULSIFICATION CLEAR CORNEA WITH TORIC INTRAOCULAR LENS IMPLANT  - Wound Class: I-Clean    Diagnosis:cataract   Diagnosis Additional Information: No value filed.    Anesthesia Type:  MAC    Note:  Anesthesia Post Evaluation    Patient location during evaluation: PACU  Patient participation: Able to fully participate in evaluation  Level of consciousness: awake  Pain management: adequate  Airway patency: patent  Cardiovascular status: acceptable  Respiratory status: acceptable  Hydration status: acceptable  PONV: none     Anesthetic complications: None          Last vitals:  Vitals:    06/05/18 1344 06/05/18 1547 06/05/18 1609   BP: 115/84 118/48 125/90   Pulse:  69 69   Resp: 16 16 16   Temp: 36.9  C (98.4  F)     SpO2: 100% 99% 100%         Electronically Signed By: Jase Ríos MD  June 5, 2018  6:19 PM

## 2018-06-06 ENCOUNTER — OFFICE VISIT (OUTPATIENT)
Dept: OPTOMETRY | Facility: CLINIC | Age: 53
End: 2018-06-06
Payer: COMMERCIAL

## 2018-06-06 ENCOUNTER — OFFICE VISIT (OUTPATIENT)
Dept: OPHTHALMOLOGY | Facility: CLINIC | Age: 53
End: 2018-06-06
Attending: OPHTHALMOLOGY
Payer: COMMERCIAL

## 2018-06-06 DIAGNOSIS — H25.12 NUCLEAR SENILE CATARACT OF LEFT EYE: ICD-10-CM

## 2018-06-06 DIAGNOSIS — H25.011 CORTICAL SENILE CATARACT OF RIGHT EYE: Primary | ICD-10-CM

## 2018-06-06 DIAGNOSIS — H52.31 ANISOMETROPIA: Primary | ICD-10-CM

## 2018-06-06 DIAGNOSIS — Z48.810 AFTERCARE FOLLOWING SURGERY OF A SENSORY ORGAN: ICD-10-CM

## 2018-06-06 DIAGNOSIS — Z96.1 PSEUDOPHAKIA: ICD-10-CM

## 2018-06-06 DIAGNOSIS — H25.13 NUCLEAR SENILE CATARACT OF BOTH EYES: ICD-10-CM

## 2018-06-06 PROCEDURE — G0463 HOSPITAL OUTPT CLINIC VISIT: HCPCS | Mod: ZF

## 2018-06-06 ASSESSMENT — VISUAL ACUITY
OD_SC: 20/20-2
METHOD: SNELLEN - LINEAR
METHOD: SNELLEN - LINEAR
OD_SC: 20/20
CORRECTION_TYPE: CONTACTS
OS_CC: 20/30-2
OS_CC+: -2
OS_CC: 20/30
CORRECTION_TYPE: CONTACTS
OD_SC+: -2

## 2018-06-06 ASSESSMENT — SLIT LAMP EXAM - LIDS
COMMENTS: NORMAL

## 2018-06-06 ASSESSMENT — REFRACTION_WEARINGRX
OD_CYLINDER: +1.25
OD_ADD: +1.25
OS_SPHERE: -11.00
OS_ADD: +1.25
OD_SPHERE: -12.50
SPECS_TYPE: PAL
OS_CYLINDER: SPHERE
OD_AXIS: 105

## 2018-06-06 ASSESSMENT — TONOMETRY
OS_IOP_MMHG: CTL
IOP_METHOD: TONOPEN
OD_IOP_MMHG: 18

## 2018-06-06 ASSESSMENT — REFRACTION_CURRENTRX
OD_BRAND: BLANCHARD RGP
OD_BASECURVE: 7.89
OS_BASECURVE: 7.89
OS_SPHERE: -8.50
OD_SPHERE: -9.50
OS_DIAMETER: 9.5
OS_SPHERE: -9.50
OS_BRAND: BLANCHARD RGP
OS_BRAND: BIOFINITY
OS_BASECURVE: 8.6
OS_DIAMETER: 14.0
OD_DIAMETER: 9.5

## 2018-06-06 ASSESSMENT — EXTERNAL EXAM - LEFT EYE
OS_EXAM: NORMAL
OS_EXAM: NORMAL

## 2018-06-06 ASSESSMENT — EXTERNAL EXAM - RIGHT EYE
OD_EXAM: NORMAL
OD_EXAM: NORMAL

## 2018-06-06 NOTE — PROGRESS NOTES
CC: Postoperative day#1 s/p Cataract extraction with IOL OD    Interval: Slept well, no pain.    HPI: 51yo CF presenting for cataract evaluation OD. Reports trouble with reading and night driving in OD, worsening. Painless, progressive vision loss OD. History of Pars plana vitrectomy (PPV)/SB OD for Retinal detachment  Repair 10 yrs prior with Dr. Hamilton.    POHx:  S/p pars plana vitrectomy/SB OD for retinal detachment    Meds:   None    A/P:  0) Postoperative day#1 s/p Cataract extraction with IOL OD  - off to a good start  - 20/20 without correction  - pred, acular and oflox four times a day    - precautions discussed     1.) Cataract OS  - discussed anisometropia, and that she will need to continue RGPs in OS post Cataract extraction OD. May consider Cataract extraction OS if unable to tolerate anisometropia  - reports that she did not tolerate monovision with RGPs in the past, wants to try mini-monovision post-op  - schedule Cataract extraction with IOL OS  - f/u with  for mini-monovision trial    - MAC/topical  - good dilation 8 millimeters  - no guttata/PXE      2.) s/p PPV and scleral buckle repair OD, Pavingstone degeneration both eyes  3.) Degenerative Myopia OU/Presbyopia  -Retinal exam stable  -Reviewed signs/symptoms of RD and RTC stat should symptoms occur    return to clinic in 1 week, earlier as needed    BRITTANY Seay  Cornea fellow    ~~~~~~~~~~~~~~~~~~~~~~~~~~~~~~~~~~~~~~~~~~~~~~~~~~~~~~~~~~~~~~~~    Complete documentation of historical and exam elements from today's encounter can be found in the full encounter summary report (not reduplicated in this progress note). I personally obtained the chief complaint(s) and history of present illness.  I confirmed and edited as necessary the review of systems, past medical/surgical history, family history, social history, and examination findings as documented by others; and I examined the patient myself. I personally reviewed the relevant  tests, images, and reports as documented above. I formulated and edited as necessary the assessment and plan and discussed the findings and management plan with the patient and family.    Yuriy Vincent MD

## 2018-06-06 NOTE — MR AVS SNAPSHOT
After Visit Summary   6/6/2018    Anna Carrington    MRN: [id number]           Patient Information     Date Of Birth          1965        Visit Information        Provider Department      6/6/2018 3:00 PM Andreia Conklin, OD [facility]        Today's Diagnoses     Anisometropia    -  1    Pseudophakia           Follow-ups after your visit        Your next 10 appointments already scheduled     Jun 25, 2018  8:30 AM CDT   Post-Op with Yuriy Vincent MD   Eye Clinic ([facility])    [address] [address]   [phone]            Jul 16, 2018  8:15 AM CDT   Post-Op with Yuriy Vincent MD   Eye Clinic ([facility])    [address] [id number] [phone]            Sep 21, 2018  9:30 AM CDT   Walk In From ENT with Angi Squires   [facility] Audiology ([facility])    [address] [place] [id number] [phone]            Sep 21, 2018 10:30 AM CDT   (Arrive by 10:15 AM)   Return Visit with Anne Fang MD   [facility] Ear Nose and Throat ([facility])    [address] [place] [id number] [phone]              Who to contact     Please call your clinic at [phone] to:    Ask questions about your health    Make or cancel appointments    Discuss your medicines    Learn about your test results    Speak to your doctor            Additional Information About Your Visit        MyChart Information     [employer] gives you secure access to your electronic health record. If you see a primary care provider, you can also send messages to your care team and make appointments. If you have questions, please call your primary care clinic.  If you do not have a primary care provider, please call [phone] and they will assist  you.      [employer] is an electronic gateway that provides easy, online access to your medical records. With [employer], you can request a clinic appointment, read your test results, renew a prescription or communicate with your care team.     To access your existing account, please contact your [facility] or call [phone] for assistance.        Care EveryWhere ID     This is your Care EveryWhere ID. This could be used by other organizations to access your [place] medical records  [id number]         Blood Pressure from Last 3 Encounters:   06/05/18 125/90   01/17/18 107/64   11/21/12 123/84    Weight from Last 3 Encounters:   06/05/18 72.1 kg (159 lb)   03/20/18 70.3 kg (155 lb)   02/06/18 70.3 kg (155 lb)              Today, you had the following     No orders found for display       Primary Care Provider Fax #    [facility] [phone]       [address] [place] [address]        Equal Access to Services     BRADY SALDAÑA : Hadii aad ku hadasho Soomaali, waaxda luqadaha, qaybta kaalmada adeegyada, waxay cyin hayernan mandy spain . So [facility] [phone].    ATENCIÓN: Si habla español, tiene a loyola disposición servicios gratuitos de asistencia lingüística. Llame al [phone].    We comply with applicable federal civil rights laws and [place] laws. We do not discriminate on the basis of race, color, national origin, age, disability, sex, sexual orientation, or gender identity.            Thank you!     Thank you for choosing EYE CLINIC  for your care. Our goal is always to provide you with excellent care. Hearing back from our patients is one way we can continue to improve our services. Please take a few minutes to complete the written survey that you may receive in the mail after your visit with us. Thank you!             Your Updated Medication List - Protect others around you: Learn how to safely use, store and throw away your medicines at  www.disposemymeds.org.          This list is accurate as of 6/6/18  3:31 PM.  Always use your most recent med list.                   Brand Name Dispense Instructions for use Diagnosis    Calcium carb-Vitamin D 500 mg [place]-200 units 500-200 MG-UNIT per tablet    OSCAL with D;Oyster Shell Calcium          ofloxacin 0.3 % ophthalmic solution    OCUFLOX    1 Bottle    Place 1-2 drops into the right eye 4 times daily    Anterior subcapsular polar senile cataract of right eye       prednisoLONE acetate 1 % ophthalmic susp    PRED FORTE    1 Bottle    Place 1-2 drops into the right eye 4 times daily    Anterior subcapsular polar senile cataract of right eye

## 2018-06-06 NOTE — NURSING NOTE
Chief Complaints and History of Present Illnesses   Patient presents with     Post Op (Ophthalmology) Right Eye     HPI    Symptoms:           Do you have eye pain now?:  No      Comments:  One day POP right eye CE/IOL.    ANDREA Coleman 1:21 PM 06/06/2018

## 2018-06-06 NOTE — MR AVS SNAPSHOT
After Visit Summary   6/6/2018    Anna Carrington    MRN: [id number]           Patient Information     Date Of Birth          1965        Visit Information        Provider Department      6/6/2018 1:00 PM Yuriy Vincent MD Eye Clinic        Today's Diagnoses     Cortical senile cataract of right eye    -  1    Nuclear senile cataract of both eyes        Aftercare following surgery of a sensory organ        Nuclear senile cataract of left eye           Follow-ups after your visit        Follow-up notes from your care team     Return in about 1 week (around 6/13/2018) for Follow up vision pressure OU.      Your next 10 appointments already scheduled     Jun 25, 2018  8:30 AM CDT   Post-Op with Yuriy Vincent MD   Eye Clinic ([facility])    [address] [address]   [phone]            Jul 18, 2018  8:30 AM CDT   Post-Op with Yuriy Vincent MD   Eye Clinic ([facility])    [address] [address]   [phone]            Jul 25, 2018  8:30 AM CDT   Post-Op with Yuriy Vincent MD   Eye Clinic ([facility])    [address] [address]   [phone]            Aug 17, 2018  1:00 PM CDT   Post-Op with Yuriy Vincent MD   Eye Clinic ([facility])    [address] [address]   [phone]            Sep 21, 2018  9:30 AM CDT   Walk In From ENT with Angi Squires   [facility] ([facility])    [address] [facility]  4th Floor  [facility] [address] [phone]            Sep 21, 2018 10:30 AM CDT   (Arrive by 10:15 AM)   Return Visit with Anne Fang MD   [facility] ([facility] [place] [facility])    [address]  [facility] [address]  [address] [place] [address] [phone]              Who to contact     Please call your clinic at [phone] to:    Ask questions about your health    Make or cancel appointments    Discuss your medicines    Learn about your test results    Speak to your doctor            Additional Information About Your Visit        MyChart Information     [employer] gives you secure access to your electronic health record. If you see a primary care provider, you can also send messages to your care team and make appointments. If you have questions, please call your primary care clinic.  If you do not have a primary care provider, please call [phone] and they will assist you.      [employer] is an electronic gateway that provides easy, online access to your medical records. With [employer], you can request a clinic appointment, read your test results, renew a prescription or communicate with your care team.     To access your existing account, please contact your [facility] or call [phone] for assistance.        Care EveryWhere ID     This is your Care EveryWhere ID. This could be used by other organizations to access your [place] medical records  [id number]         Blood Pressure from Last 3 Encounters:   06/05/18 125/90   01/17/18 107/64   11/21/12 123/84    Weight from Last 3 Encounters:   06/05/18 72.1 kg (159 lb)   03/20/18 70.3 kg (155 lb)   02/06/18 70.3 kg (155 lb)              We Performed the Following     Doris-Operative Worksheet        Primary Care Provider Fax #    [employer] [phone]       [address] [place] [address]        Equal Access to Services     BRADY SALDAÑA AH: Hadii aad ku hadasho Soomaali, waaxda luqadaha, qaybta kaalmada adeegyada, rahel brennan. So [facility] [phone].    ATENCIÓN: Si habla español, tiene a loyola disposición servicios gratuitos de asistencia lingüística. Llame al  [phone].    We comply with applicable federal civil rights laws and [place] laws. We do not discriminate on the basis of race, color, national origin, age, disability, sex, sexual orientation, or gender identity.            Thank you!     Thank you for choosing EYE CLINIC  for your care. Our goal is always to provide you with excellent care. Hearing back from our patients is one way we can continue to improve our services. Please take a few minutes to complete the written survey that you may receive in the mail after your visit with us. Thank you!             Your Updated Medication List - Protect others around you: Learn how to safely use, store and throw away your medicines at www.disposemymeds.org.          This list is accurate as of 6/6/18 11:59 PM.  Always use your most recent med list.                   Brand Name Dispense Instructions for use Diagnosis    Calcium carb-Vitamin D 500 mg [place]-200 units 500-200 MG-UNIT per tablet    OSCAL with D;Oyster Shell Calcium          ofloxacin 0.3 % ophthalmic solution    OCUFLOX    1 Bottle    Place 1-2 drops into the right eye 4 times daily    Anterior subcapsular polar senile cataract of right eye       prednisoLONE acetate 1 % ophthalmic susp    PRED FORTE    1 Bottle    Place 1-2 drops into the right eye 4 times daily    Anterior subcapsular polar senile cataract of right eye

## 2018-06-06 NOTE — PROGRESS NOTES
A/P  1.) Anisometropia s/p CE/IOL OD, cataract OS  -Monovision trial OS today, did well initially with +1.25/+1.50 add  -Good vision/comfort/fit in Biofinity left eye (+1.50 add)  -She is longtime RGP wearer. We have option to put monovision in RGP but logistically more difficult. Acuity is good enough in soft lens it should work well for monovision trial.   -Successful soft lens I&R, reviewed CL care and hygiene with pt    F/u with Dr. Vincent for cataract surgery OS. If she does not do well in soft can call to order monovision RGP.

## 2018-06-07 ENCOUNTER — TELEPHONE (OUTPATIENT)
Dept: OPHTHALMOLOGY | Facility: CLINIC | Age: 53
End: 2018-06-07

## 2018-06-25 ENCOUNTER — OFFICE VISIT (OUTPATIENT)
Dept: OPHTHALMOLOGY | Facility: CLINIC | Age: 53
End: 2018-06-25
Attending: OPHTHALMOLOGY
Payer: COMMERCIAL

## 2018-06-25 DIAGNOSIS — Z48.810 AFTERCARE FOLLOWING SURGERY OF A SENSORY ORGAN: Primary | ICD-10-CM

## 2018-06-25 DIAGNOSIS — H25.12 NUCLEAR SENILE CATARACT OF LEFT EYE: ICD-10-CM

## 2018-06-25 DIAGNOSIS — H25.031: ICD-10-CM

## 2018-06-25 PROCEDURE — G0463 HOSPITAL OUTPT CLINIC VISIT: HCPCS | Mod: ZF

## 2018-06-25 RX ORDER — PREDNISOLONE ACETATE 10 MG/ML
1-2 SUSPENSION/ DROPS OPHTHALMIC 4 TIMES DAILY
Qty: 1 BOTTLE | Refills: 1 | Status: SHIPPED | OUTPATIENT
Start: 2018-06-25 | End: 2018-09-21

## 2018-06-25 ASSESSMENT — EXTERNAL EXAM - RIGHT EYE: OD_EXAM: NORMAL

## 2018-06-25 ASSESSMENT — EXTERNAL EXAM - LEFT EYE: OS_EXAM: NORMAL

## 2018-06-25 ASSESSMENT — VISUAL ACUITY
CORRECTION_TYPE: CONTACTS
OD_SC: 20/20
OS_CC: 20/30
OS_PH_CC: 20/20
OD_SC+: -1
METHOD: SNELLEN - LINEAR

## 2018-06-25 ASSESSMENT — SLIT LAMP EXAM - LIDS
COMMENTS: NORMAL
COMMENTS: NORMAL

## 2018-06-25 ASSESSMENT — TONOMETRY
OD_IOP_MMHG: 16
IOP_METHOD: ICARE
OS_IOP_MMHG: 16

## 2018-06-25 NOTE — NURSING NOTE
Chief Complaints and History of Present Illnesses   Patient presents with     Post Op (Ophthalmology) Right Eye      3 week post cataract      HPI    Last Eye Exam:  6/6/18   Affected eye(s):  Right   Symptoms:        Duration:  3 weeks   Frequency:  Constant       Do you have eye pain now?:  No      Comments:  Anna is here for her 3 week post op after cataract surgery RE. She says her RE feels good and enjoys not needing correction.     Frantz Whitt COT 8:48 AM June 25, 2018

## 2018-06-25 NOTE — PROGRESS NOTES
CC: Postoperative week #3 s/p Cataract extraction with IOL OD 6/5/18    Interval: Doing well    HPI: 53yo CF presenting for cataract evaluation OD. Reports trouble with reading and night driving in OD, worsening. Painless, progressive vision loss OD. History of Pars plana vitrectomy (PPV)/SB OD for Retinal detachment  Repair 10 yrs prior with Dr. Hamilton.    POHx:  S/p pars plana vitrectomy/SB OD for retinal detachment    Meds:   PF four times a day   Ofloxacin four times a day      A/P:  0) Postoperative week#3 s/p Cataract extraction with IOL OD  - doing well  - 20/20 without correction  - taper PF three times a day x 1 week, twice a day x 1 week, daily x 1 week then stop   - stop ofloxacin    1.) Cataract OS  - doing well with monovision trial  - will plan for near -1.25 to 1.50 left eye (patient may elect to change on day of surgery, will continue monovision trial)  -surgery planned for 7/17    - MAC/topical  - good dilation 8 millimeters  - no guttata/PXE      2.) s/p PPV and scleral buckle repair OD, Pavingstone degeneration both eyes  3.) Degenerative Myopia OU/Presbyopia  -Reviewed signs/symptoms of RD and RTC stat should symptoms occur    return to clinic Postoperative day 1    Juancho Andino MD  PGY3, Dept of Ophthalmology  Pager [phone]    ~~~~~~~~~~~~~~~~~~~~~~~~~~~~~~~~~~~~~~~~~~~~~~~~~~~~~~~~~~~~~~~~    Complete documentation of historical and exam elements from today's encounter can be found in the full encounter summary report (not reduplicated in this progress note). I personally obtained the chief complaint(s) and history of present illness.  I confirmed and edited as necessary the review of systems, past medical/surgical history, family history, social history, and examination findings as documented by others; and I examined the patient myself. I personally reviewed the relevant tests, images, and reports as documented above. I formulated and edited as necessary the assessment and plan and  discussed the findings and management plan with the patient and family.    Yuriy Vincent MD

## 2018-06-25 NOTE — MR AVS SNAPSHOT
After Visit Summary   6/25/2018    Anna Carrington    MRN: [id number]           Patient Information     Date Of Birth          1965        Visit Information        Provider Department      6/25/2018 8:30 AM Yuriy Vincent MD [facility]        Today's Diagnoses     Aftercare following surgery of a sensory organ    -  1    Anterior subcapsular polar senile cataract of right eye        Nuclear senile cataract of left eye           Follow-ups after your visit        Follow-up notes from your care team     Return for vision pressur eOU postop.      Your next 10 appointments already scheduled     Jul 18, 2018  8:30 AM CDT   Post-Op with Yuriy Vincent MD   Eye Clinic ([facility])    [facility]  [address] [place] [address] Clin [address] [place] [address]   [phone]            Jul 25, 2018  8:30 AM CDT   Post-Op with Yuriy Vincent MD   Eye Clinic ([facility])    [facility]  [address] [place] [address]   [phone]            Aug 17, 2018  1:00 PM CDT   Post-Op with Yuriy Vincent MD   Eye Clinic ([facility])    [facility]  [address] [place] [address]   [phone]            Sep 21, 2018  9:30 AM CDT   Walk In From ENT with Angi Squires   [facility] ([facility])    [address] [address] [phone]            Sep 21, 2018 10:30 AM CDT   (Arrive by 10:15 AM)   Return Visit with Anne Fang MD   [facility] ([facility])    [address] [id number] [phone]              Who to contact     Please call your clinic at [phone] to:    Ask questions about your health    Make or cancel appointments    Discuss your medicines    Learn about your test results    Speak to  your doctor            Additional Information About Your Visit        [employer] Information     [employer] gives you secure access to your electronic health record. If you see a primary care provider, you can also send messages to your care team and make appointments. If you have questions, please call your primary care clinic.  If you do not have a primary care provider, please call [phone] and they will assist you.      [employer] is an electronic gateway that provides easy, online access to your medical records. With [employer], you can request a clinic appointment, read your test results, renew a prescription or communicate with your care team.     To access your existing account, please contact your [facility] or call [phone] for assistance.        Care EveryWhere ID     This is your Care EveryWhere ID. This could be used by other organizations to access your [facility] medical records  [id number]         Blood Pressure from Last 3 Encounters:   06/05/18 125/90   01/17/18 107/64   11/21/12 123/84    Weight from Last 3 Encounters:   06/05/18 72.1 kg (159 lb)   03/20/18 70.3 kg (155 lb)   02/06/18 70.3 kg (155 lb)              Today, you had the following     No orders found for display         Where to get your medicines      These medications were sent to [facility] - [place], [place] - [address]  [address] [facility] [address]     Phone:  [phone]     prednisoLONE acetate 1 % ophthalmic susp          Primary Care Provider Fax #    [email or web address] [phone]       [address] [place] [address]        Equal Access to Services     BRADY SALDAÑA : Hadii cassia hair hadasho Soomaali, waaxda luqadaha, qaybta kaalmada adeegyada, rahel brennan. So [facility] [phone].    ATENCIÓN: Si habla español, tiene a loyola disposición servicios gratuitos de asistencia lingüística. Llame al [phone].    We comply with applicable  federal civil rights laws and [place] laws. We do not discriminate on the basis of race, color, national origin, age, disability, sex, sexual orientation, or gender identity.            Thank you!     Thank you for choosing EYE CLINIC  for your care. Our goal is always to provide you with excellent care. Hearing back from our patients is one way we can continue to improve our services. Please take a few minutes to complete the written survey that you may receive in the mail after your visit with us. Thank you!             Your Updated Medication List - Protect others around you: Learn how to safely use, store and throw away your medicines at www.disposemymeds.org.          This list is accurate as of 6/25/18  2:17 PM.  Always use your most recent med list.                   Brand Name Dispense Instructions for use Diagnosis    Calcium carb-Vitamin D 500 mg [place]-200 units 500-200 MG-UNIT per tablet    OSCAL with D;Oyster Shell Calcium          ofloxacin 0.3 % ophthalmic solution    OCUFLOX    1 Bottle    Place 1-2 drops into the right eye 4 times daily    Anterior subcapsular polar senile cataract of right eye       prednisoLONE acetate 1 % ophthalmic susp    PRED FORTE    1 Bottle    Place 1-2 drops into the right eye 4 times daily    Anterior subcapsular polar senile cataract of right eye

## 2018-07-17 ENCOUNTER — TRANSFERRED RECORDS (OUTPATIENT)
Dept: HEALTH INFORMATION MANAGEMENT | Facility: CLINIC | Age: 53
End: 2018-07-17

## 2018-07-18 ENCOUNTER — OFFICE VISIT (OUTPATIENT)
Dept: OPHTHALMOLOGY | Facility: CLINIC | Age: 53
End: 2018-07-18
Attending: OPHTHALMOLOGY
Payer: COMMERCIAL

## 2018-07-18 DIAGNOSIS — Z96.1 PSEUDOPHAKIA: Primary | ICD-10-CM

## 2018-07-18 DIAGNOSIS — Z86.69 HISTORY OF RETINAL DETACHMENT: ICD-10-CM

## 2018-07-18 PROCEDURE — G0463 HOSPITAL OUTPT CLINIC VISIT: HCPCS | Mod: ZF

## 2018-07-18 ASSESSMENT — VISUAL ACUITY
OS_PH_SC: 20/30
OD_SC: 20/25
OD_SC+: +1
METHOD: SNELLEN - LINEAR
OS_SC: 20/60

## 2018-07-18 ASSESSMENT — TONOMETRY
OS_IOP_MMHG: 25
OD_IOP_MMHG: 17
IOP_METHOD: TONOPEN

## 2018-07-18 ASSESSMENT — EXTERNAL EXAM - RIGHT EYE: OD_EXAM: NORMAL

## 2018-07-18 ASSESSMENT — SLIT LAMP EXAM - LIDS
COMMENTS: NORMAL
COMMENTS: NORMAL

## 2018-07-18 ASSESSMENT — EXTERNAL EXAM - LEFT EYE: OS_EXAM: NORMAL

## 2018-07-18 NOTE — PROGRESS NOTES
CC: s/p Cataract extraction with IOL OD 6/5/18    Interval: Doing well, mild irritation, no pain, tearing, minimal photophobia.    HPI: 51yo CF presenting for cataract evaluation OD. Reports trouble with reading and night driving in OD, worsening. Painless, progressive vision loss OD. History of Pars plana vitrectomy (PPV)/SB OD for Retinal detachment  Repair 10 yrs prior with Dr. Hamilton.    POHx:  S/p pars plana vitrectomy/SB OD for retinal detachment  S/p CE/IOL OD toric 6/5/18  S/p CE/IOL OS toric 7/17/18 - mini-monovision    Meds:   PF four times a day   Ofloxacin four times a day      A/P:  0) s/p Cataract extraction with IOL OD  - doing well, progressive PCO  - may need YAG in the future. Given toric lens, would defer until POM#3    1.) s/p CE/IOL OS Toric - mini-monovision  - did well with monovision trial  - start PF QID OS  - start ofloxacin QID OS  - discussed post-op precautions    2.) s/p PPV and scleral buckle repair OD, Pavingstone degeneration both eyes  3.) Degenerative Myopia OU/Presbyopia  -Reviewed signs/symptoms of RD and RTC stat should symptoms occur    return to clinic Postoperative week 1    Attending Physician Attestation:  Complete documentation of historical and exam elements from today's encounter can be found in the full encounter summary report (not reduplicated in this progress note).  I personally obtained the chief complaint(s) and history of present illness.  I confirmed and edited as necessary the review of systems, past medical/surgical history, family history, social history, and examination findings as documented by others; and I examined the patient myself.  I personally reviewed the relevant tests, images, and reports as documented above.  I formulated and edited as necessary the assessment and plan and discussed the findings and management plan with the patient and family. - Yuriy Vincent MD

## 2018-07-18 NOTE — MR AVS SNAPSHOT
After Visit Summary   7/18/2018    Anna Carrington    MRN: [id number]           Patient Information     Date Of Birth          1965        Visit Information        Provider Department      7/18/2018 8:30 AM Yuriy Vincent MD [facility]        Today's Diagnoses     Pseudophakia - Both Eyes    -  1    History of retinal detachment - Right Eye           Follow-ups after your visit        Follow-up notes from your care team     Return for vision pressure OU.      Your next 10 appointments already scheduled     Jul 25, 2018  8:30 AM CDT   Post-Op with Yuriy Vincent MD   Eye Clinic [facility]    [facility]  [address] [facility]  [address] [address]  [facility] [address]   [phone]            Aug 17, 2018  1:00 PM CDT   Post-Op with Yuriy Vincent MD   Eye Clinic ([facility])    [place] [address] [facility]  [address] [address]  [facility] [address]   [phone]            Sep 21, 2018  9:30 AM CDT   Walk In From ENT with Angi Squires   [facility] ([facility])    [address] [id number] [phone]            Sep 21, 2018 10:30 AM CDT   (Arrive by 10:15 AM)   Return Visit with Anne Fang MD   [facility] ([facility] [place] [facility])    [address] [address] [phone]              Who to contact     Please call your clinic at [phone] to:    Ask questions about your health    Make or cancel appointments    Discuss your medicines    Learn about your test results    Speak to your doctor            Additional Information About Your Visit        MyChart Information     [employer] gives you secure access to your electronic health record. If you see a primary care provider, you can also send messages to your care team and make appointments. If you have questions, please call your  primary care clinic.  If you do not have a primary care provider, please call [phone] and they will assist you.      [employer] is an electronic gateway that provides easy, online access to your medical records. With [employer], you can request a clinic appointment, read your test results, renew a prescription or communicate with your care team.     To access your existing account, please contact your [facility] or call [phone] for assistance.        Care EveryWhere ID     This is your Care EveryWhere ID. This could be used by other organizations to access your [place] medical records  [id number]         Blood Pressure from Last 3 Encounters:   06/05/18 125/90   01/17/18 107/64   11/21/12 123/84    Weight from Last 3 Encounters:   06/05/18 72.1 kg (159 lb)   03/20/18 70.3 kg (155 lb)   02/06/18 70.3 kg (155 lb)              Today, you had the following     No orders found for display       Primary Care Provider Fax #    [facility] [phone]       [address] [place] [address]        Equal Access to Services     [facility]: Hadii aad ku hadasho Soomaali, waaxda luqadaha, qaybta kaalmada adeamilcar, rahel spain . So [facility] [phone].    ATENCIÓN: Si habla español, tiene a loyola disposición servicios gratuitos de asistencia lingüística. Sohail al [phone].    We comply with applicable federal civil rights laws and [place] laws. We do not discriminate on the basis of race, color, national origin, age, disability, sex, sexual orientation, or gender identity.            Thank you!     Thank you for choosing EYE CLINIC  for your care. Our goal is always to provide you with excellent care. Hearing back from our patients is one way we can continue to improve our services. Please take a few minutes to complete the written survey that you may receive in the mail after your visit with us. Thank you!             Your Updated  Medication List - Protect others around you: Learn how to safely use, store and throw away your medicines at www.disposemymeds.org.          This list is accurate as of 7/18/18  9:35 AM.  Always use your most recent med list.                   Brand Name Dispense Instructions for use Diagnosis    Calcium carb-Vitamin D 500 mg [place]-200 units 500-200 MG-UNIT per tablet    OSCAL with D;Oyster Shell Calcium          ofloxacin 0.3 % ophthalmic solution    OCUFLOX    1 Bottle    Place 1-2 drops into the right eye 4 times daily    Anterior subcapsular polar senile cataract of right eye       prednisoLONE acetate 1 % ophthalmic susp    PRED FORTE    1 Bottle    Place 1-2 drops into the right eye 4 times daily    Anterior subcapsular polar senile cataract of right eye

## 2018-07-18 NOTE — NURSING NOTE
Chief Complaints and History of Present Illnesses   Patient presents with     Post Op (Ophthalmology) Left Eye     1 day P/O LE IOL     HPI    Affected eye(s):  Left   Symptoms:     Blurred vision   No decreased vision   No floaters   No flashes   No redness   No foreign body sensation      Duration:  1 day   Frequency:  Constant       Do you have eye pain now?:  No      Comments:   1 day P/O LE IOL 7/17/2018 and Postoperative week #6 s/p Cataract extraction with IOL OD 6/5/18   PF four times a day  LE  Ofloxacin four times a day LE  AT's 4 X daily OU  Curt Garcia  8:53 AM July 18, 2018

## 2018-07-25 ENCOUNTER — OFFICE VISIT (OUTPATIENT)
Dept: OPHTHALMOLOGY | Facility: CLINIC | Age: 53
End: 2018-07-25
Attending: OPHTHALMOLOGY
Payer: COMMERCIAL

## 2018-07-25 DIAGNOSIS — Z96.1 PSEUDOPHAKIA: Primary | ICD-10-CM

## 2018-07-25 DIAGNOSIS — Z86.69 HISTORY OF RETINAL DETACHMENT: ICD-10-CM

## 2018-07-25 PROCEDURE — G0463 HOSPITAL OUTPT CLINIC VISIT: HCPCS | Mod: ZF

## 2018-07-25 RX ORDER — PREDNISOLONE ACETATE 10 MG/ML
1 SUSPENSION/ DROPS OPHTHALMIC 3 TIMES DAILY
Qty: 1 BOTTLE | Refills: 0 | Status: SHIPPED | OUTPATIENT
Start: 2018-07-25 | End: 2018-09-21

## 2018-07-25 ASSESSMENT — EXTERNAL EXAM - LEFT EYE: OS_EXAM: NORMAL

## 2018-07-25 ASSESSMENT — EXTERNAL EXAM - RIGHT EYE: OD_EXAM: NORMAL

## 2018-07-25 ASSESSMENT — TONOMETRY
OD_IOP_MMHG: 15
IOP_METHOD: ICARE
OS_IOP_MMHG: 12

## 2018-07-25 ASSESSMENT — SLIT LAMP EXAM - LIDS
COMMENTS: NORMAL
COMMENTS: NORMAL

## 2018-07-25 ASSESSMENT — VISUAL ACUITY
OD_SC: 20/20
OS_SC: J1+
OS_PH_SC: 20/20
OS_SC: 20/60
METHOD: SNELLEN - LINEAR

## 2018-07-25 NOTE — PATIENT INSTRUCTIONS
Prednisolone (PINK Top) three times a day for 1 week, then twice a day for 1 week, then once a day for 1 week, then STOP  STOP ofloxacin

## 2018-07-25 NOTE — MR AVS SNAPSHOT
After Visit Summary   7/25/2018    Anna Carrington    MRN: [id number]           Patient Information     Date Of Birth          1965        Visit Information        Provider Department      7/25/2018 8:30 AM Yuriy Vincent MD [facility]        Today's Diagnoses     Pseudophakia - Both Eyes    -  1    History of retinal detachment - Right Eye          Care Instructions    Prednisolone (PINK Top) three times a day for 1 week, then twice a day for 1 week, then once a day for 1 week, then STOP  STOP ofloxacin          Follow-ups after your visit        Follow-up notes from your care team     Return for vision prssure OU.      Your next 10 appointments already scheduled     Aug 17, 2018  1:00 PM CDT   Post-Op with Yuriy Vincent MD   Eye Clinic ([facility])    [address] [address]   [phone]            Sep 21, 2018  9:30 AM CDT   Walk In From ENT with Angi Squires   [facility] Audiology ([facility])    [address] [place] [id number] [phone]            Sep 21, 2018 10:30 AM CDT   (Arrive by 10:15 AM)   Return Visit with Anne Fang MD   [facility] Ear Nose and Throat ([facility])    [address] [place] [id number] [phone]              Who to contact     Please call your clinic at [phone] to:    Ask questions about your health    Make or cancel appointments    Discuss your medicines    Learn about your test results    Speak to your doctor            Additional Information About Your Visit        MyChart Information     InSeT Systemshart gives you secure access to your electronic health record. If you see a primary care provider, you can also send messages to your care team and make appointments. If you have questions, please call your primary care clinic.  If you do not have a primary  care provider, please call [phone] and they will assist you.      Craftistas is an electronic gateway that provides easy, online access to your medical records. With Craftistas, you can request a clinic appointment, read your test results, renew a prescription or communicate with your care team.     To access your existing account, please contact your [facility] or call [phone] for assistance.        Care EveryWhere ID     This is your Care EveryWhere ID. This could be used by other organizations to access your [place] medical records  [id number]         Blood Pressure from Last 3 Encounters:   06/05/18 125/90   01/17/18 107/64   11/21/12 123/84    Weight from Last 3 Encounters:   06/05/18 72.1 kg (159 lb)   03/20/18 70.3 kg (155 lb)   02/06/18 70.3 kg (155 lb)              Today, you had the following     No orders found for display       Primary Care Provider Fax #    [employer] [phone]       [address] [place] [address]        Equal Access to Services     BRADY [facility] CenterRUSSEL : Hadii aad ku hadasho Soomaali, waaxda luqadaha, qaybta kaalmada adeegyada, rahel reyes hayalonzo spain . So [facility] [phone].    ATENCIÓN: Si habla español, tiene a loyola disposición servicios gratuitos de asistencia lingüística. Llame al [phone].    We comply with applicable federal civil rights laws and [place] laws. We do not discriminate on the basis of race, color, national origin, age, disability, sex, sexual orientation, or gender identity.            Thank you!     Thank you for choosing EYE CLINIC  for your care. Our goal is always to provide you with excellent care. Hearing back from our patients is one way we can continue to improve our services. Please take a few minutes to complete the written survey that you may receive in the mail after your visit with us. Thank you!             Your Updated Medication List - Protect others around you: Learn how  to safely use, store and throw away your medicines at www.disposemymeds.org.          This list is accurate as of 7/25/18  8:55 AM.  Always use your most recent med list.                   Brand Name Dispense Instructions for use Diagnosis    Calcium carb-Vitamin D 500 mg [place]-200 units 500-200 MG-UNIT per tablet    OSCAL with D;Oyster Shell Calcium          ofloxacin 0.3 % ophthalmic solution    OCUFLOX    1 Bottle    Place 1-2 drops into the right eye 4 times daily    Anterior subcapsular polar senile cataract of right eye       prednisoLONE acetate 1 % ophthalmic susp    PRED FORTE    1 Bottle    Place 1-2 drops into the right eye 4 times daily    Anterior subcapsular polar senile cataract of right eye

## 2018-07-25 NOTE — PROGRESS NOTES
CC: s/p Cataract extraction with IOL OD 6/5/18    Interval: Doing well, mild irritation, no pain, tearing, minimal photophobia.    HPI: 51yo CF presenting for cataract evaluation OD. Reports trouble with reading and night driving in OD, worsening. Painless, progressive vision loss OD. History of Pars plana vitrectomy (PPV)/SB OD for Retinal detachment  Repair 10 yrs prior with Dr. Hamilton.    POHx:  S/p pars plana vitrectomy/SB OD for retinal detachment  S/p CE/IOL OD toric 6/5/18  S/p CE/IOL OS toric 7/17/18 - mini-monovision    Meds:   PF four times a day OS  Ofloxacin four times a dayOS      A/P:  1) s/p Cataract extraction with IOL OD  - doing well, progressive PCO  - may need YAG in the future. Given toric lens, would defer until POM#3    2) s/p CE/IOL OS Toric - mini-monovision  - did well with monovision trial  - Taper PF to TID x 1 week, then BID x 1 week, then daily x 1week, then STOP  - STOP ofloxacin QID OS  - discussed post-op precautions    3) s/p PPV and scleral buckle repair OD, Pavingstone degeneration both eyes  4) Degenerative Myopia OU/Presbyopia  -Reviewed signs/symptoms of RD and RTC stat should symptoms occur    return to clinic Postoperative month 1, DFE each eye, MRx OU    Attending Physician Attestation:  Complete documentation of historical and exam elements from today's encounter can be found in the full encounter summary report (not reduplicated in this progress note).  I personally obtained the chief complaint(s) and history of present illness.  I confirmed and edited as necessary the review of systems, past medical/surgical history, family history, social history, and examination findings as documented by others; and I examined the patient myself.  I personally reviewed the relevant tests, images, and reports as documented above.  I formulated and edited as necessary the assessment and plan and discussed the findings and management plan with the patient and family. - Yuriy Vincent MD

## 2018-07-25 NOTE — NURSING NOTE
Chief Complaints and History of Present Illnesses   Patient presents with     Post Op (Ophthalmology) Left Eye     s/p Cataract extraction with IOL OD 6/5/18     HPI    Affected eye(s):  Left   Symptoms:        Duration:  1 week   Frequency:  Constant       Do you have eye pain now?:  No      Comments:  Pt. States that she is doing well.  No c/o comfort BE.  Susana Ruiz COT 8:31 AM July 25, 2018

## 2018-08-17 ENCOUNTER — OFFICE VISIT (OUTPATIENT)
Dept: OPHTHALMOLOGY | Facility: CLINIC | Age: 53
End: 2018-08-17
Attending: OPHTHALMOLOGY
Payer: COMMERCIAL

## 2018-08-17 DIAGNOSIS — Z86.69 HISTORY OF RETINAL DETACHMENT: ICD-10-CM

## 2018-08-17 DIAGNOSIS — Z96.1 PSEUDOPHAKIA: Primary | ICD-10-CM

## 2018-08-17 PROCEDURE — G0463 HOSPITAL OUTPT CLINIC VISIT: HCPCS | Mod: ZF

## 2018-08-17 ASSESSMENT — SLIT LAMP EXAM - LIDS
COMMENTS: NORMAL
COMMENTS: NORMAL

## 2018-08-17 ASSESSMENT — TONOMETRY
OD_IOP_MMHG: 18
IOP_METHOD: TONOPEN
OS_IOP_MMHG: 17

## 2018-08-17 ASSESSMENT — VISUAL ACUITY
OS_SC: 20/40
OD_SC: 20/20
OD_SC+: -2
METHOD: SNELLEN - LINEAR
OS_PH_SC: 20/25

## 2018-08-17 ASSESSMENT — REFRACTION_MANIFEST
OS_CYLINDER: +0.50
OS_SPHERE: -1.75
OD_ADD: +2.00
OS_ADD: +2.00
OD_SPHERE: -0.25
OD_CYLINDER: SPHERE
OS_AXIS: 105

## 2018-08-17 ASSESSMENT — EXTERNAL EXAM - LEFT EYE: OS_EXAM: NORMAL

## 2018-08-17 ASSESSMENT — EXTERNAL EXAM - RIGHT EYE: OD_EXAM: NORMAL

## 2018-08-17 NOTE — NURSING NOTE
Chief Complaints and History of Present Illnesses   Patient presents with     Post Op (Ophthalmology) Both Eyes     s/p CE/IOL OS Toric - mini-monovision,  s/p Cataract extraction with IOL OD     HPI    Affected eye(s):  Both   Symptoms:     No blurred vision   No decreased vision   No floaters   No flashes      Duration:  4 weeks   Frequency:  Constant       Do you have eye pain now?:  No      Comments:  Pt. stated vision has improved over the last 4 weeks each eye.  Finished her drops yesterday  Curt Garcia  1:09 PM August 17, 2018

## 2018-08-17 NOTE — MR AVS SNAPSHOT
After Visit Summary   8/17/2018    Anna Carrington    MRN: [id number]           Patient Information     Date Of Birth          1965        Visit Information        Provider Department      8/17/2018 1:00 PM Yuriy Vincent MD [facility]        Today's Diagnoses     Pseudophakia - Both Eyes    -  1    History of retinal detachment - Right Eye           Follow-ups after your visit        Follow-up notes from your care team     Return for vision pressure OU.      Your next 10 appointments already scheduled     Sep 21, 2018  9:30 AM CDT   Walk In From ENT with Angi Squires   [facility] Audiology ([facility])    [address] [place] [id number] [phone]            Sep 21, 2018 10:30 AM CDT   (Arrive by 10:15 AM)   Return Visit with Anne Fang MD   [facility] Ear Nose and Throat ([facility])    [address] [place] [id number] [phone]              Who to contact     Please call your clinic at [phone] to:    Ask questions about your health    Make or cancel appointments    Discuss your medicines    Learn about your test results    Speak to your doctor            Additional Information About Your Visit        EferioharcacaoTV Information     VaxInnate gives you secure access to your electronic health record. If you see a primary care provider, you can also send messages to your care team and make appointments. If you have questions, please call your primary care clinic.  If you do not have a primary care provider, please call [phone] and they will assist you.      VaxInnate is an electronic gateway that provides easy, online access to your medical records. With VaxInnate, you can request a clinic appointment, read your test results, renew a prescription or communicate with your care team.     To access your existing account, please contact your [facility] or call [phone] for assistance.        Care EveryWhere ID     This is your Care EveryWhere ID. This could be used by other organizations to access your [place] medical records  [id number]         Blood Pressure from Last 3 Encounters:   06/05/18 125/90   01/17/18 107/64   11/21/12 123/84    Weight from Last 3 Encounters:   06/05/18 72.1 kg (159 lb)   03/20/18 70.3 kg (155 lb)   02/06/18 70.3 kg (155 lb)              Today, you had the following     No orders found for display       Primary Care Provider Fax #    [employer] [phone]       [address] [place] [address]        Equal Access to Services     BRADY SALDAÑA : Hadii [place]ia [place], wagianni simpson, qaybta kaalmada eleonora, rahel brennan. So [facility] [phone].    ATENCIÓN: Si habla español, tiene a loyola disposición servicios gratuitos de asistencia lingüística. Llame al [phone].    We comply with applicable federal civil rights laws and [place] laws. We do not discriminate on the basis of race, color, national origin, age, disability, sex, sexual orientation, or gender identity.            Thank you!     Thank you for choosing EYE CLINIC  for your care. Our goal is always to provide you with excellent care. Hearing back from our patients is one way we can continue to improve our services. Please take a few minutes to complete the written survey that you may receive in the mail after your visit with us. Thank you!             Your Updated Medication List - Protect others around you: Learn how to safely use, store and throw away your medicines at www.disposemymeds.org.          This list is accurate as of 8/17/18  2:28 PM.  Always use your most recent med list.                   Brand Name Dispense Instructions for use Diagnosis    Calcium carb-Vitamin D 500 mg [place]-200 units 500-200 MG-UNIT per tablet    OSCAL with D;Oyster Shell Calcium          ofloxacin 0.3 %  ophthalmic solution    OCUFLOX    1 Bottle    Place 1-2 drops into the right eye 4 times daily    Anterior subcapsular polar senile cataract of right eye       * prednisoLONE acetate 1 % ophthalmic susp    PRED FORTE    1 Bottle    Place 1-2 drops into the right eye 4 times daily    Anterior subcapsular polar senile cataract of right eye       * prednisoLONE acetate 1 % ophthalmic susp    PRED FORTE    1 Bottle    Place 1 drop Into the left eye 3 times daily    Pseudophakia       * Notice:  This list has 2 medication(s) that are the same as other medications prescribed for you. Read the directions carefully, and ask your doctor or other care provider to review them with you.

## 2018-09-21 ENCOUNTER — OFFICE VISIT (OUTPATIENT)
Dept: OTOLARYNGOLOGY | Facility: CLINIC | Age: 53
End: 2018-09-21
Payer: COMMERCIAL

## 2018-09-21 ENCOUNTER — OFFICE VISIT (OUTPATIENT)
Dept: AUDIOLOGY | Facility: CLINIC | Age: 53
End: 2018-09-21
Payer: COMMERCIAL

## 2018-09-21 DIAGNOSIS — H90.A31 MIXED CONDUCTIVE AND SENSORINEURAL HEARING LOSS OF RIGHT EAR WITH RESTRICTED HEARING OF LEFT EAR: ICD-10-CM

## 2018-09-21 DIAGNOSIS — H91.90 HEARING LOSS: ICD-10-CM

## 2018-09-21 DIAGNOSIS — H80.91 OTOSCLEROSIS OF RIGHT EAR: Primary | ICD-10-CM

## 2018-09-21 DIAGNOSIS — H90.A22 SENSORINEURAL HEARING LOSS (SNHL) OF LEFT EAR WITH RESTRICTED HEARING OF RIGHT EAR: Primary | ICD-10-CM

## 2018-09-21 DIAGNOSIS — H91.90 HEARING LOSS: Primary | ICD-10-CM

## 2018-09-21 ASSESSMENT — PAIN SCALES - GENERAL: PAINLEVEL: NO PAIN (0)

## 2018-09-21 NOTE — PROGRESS NOTES
I had the pleasure of seeing Anna Carrington today in follow-up in the Otology Clinic at the [facility].      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  On January 17, 2018, she had right partial stapedectomy, and a 4 mm Gyrus classic stapes prosthesis was placed.  She has noted improvement in hearing.  This hearing has been particularly helpful to her in numerous settings including crowded environments and when there are multiple talkers.  Overall, she has been pleased with this improvement.  We had a partial closure of the air-bone gap and while significantly better than the preoperative threshold (75 dB preoperative speech reception threshold), she has been pleased with this.  She does continue to have a sensation of a change in her taste.  This has not changed since her last visit.  She does not have any other associated symptoms.      PHYSICAL EXAMINATION:  She appeared well.  The tympanic membrane has healed, and the middle ear space is well-aerated.  Facial nerve function is normal.  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      AUDIOGRAM:  Her audiogram today demonstrates that the left ear has a normal sloping to mild sensorineural hearing loss with 25 dB speech reception threshold and 100% word recognition score.  On the right, there is a mild sloping to moderate mixed loss with 40 dB speech reception threshold and 100% word recognition score.  Left ipsilateral reflex is present.        IMPRESSION AND PLAN:  Ms. Carrington is a 53-year-old woman who has had improvement in hearing following a stapes procedure.  Hearing is not symmetric with the left side but does approximate native bone line in many places which is also worse than left side bone conduction.  She has experienced significant benefit from the  hearing improvement.  We have recommended monitoring on a yearly or every other year basis.  I would be happy to see her sooner if any challenges arise.      MA/ms

## 2018-09-21 NOTE — PATIENT INSTRUCTIONS
You will need  to schedule a follow up appointment in 1 year with a repeat hearing test.        Please call our clinic for any questions,concerns,or worsening symptoms.      Clinic #[phone]       Option 3  for the ENT Care Team.       Option 1 for scheduling.    Elvira العراقي RNCC  [phone]

## 2018-09-21 NOTE — MR AVS SNAPSHOT
After Visit Summary   9/21/2018    Anna Carrington    MRN: [id number]           Patient Information     Date Of Birth          1965        Visit Information        Provider Department      9/21/2018 9:30 AM Thea Gill [facility] Audiology        Today's Diagnoses     Sensorineural hearing loss (SNHL) of left ear with restricted hearing of right ear    -  1    Mixed conductive and sensorineural hearing loss of right ear with restricted hearing of left ear           Follow-ups after your visit        Your next 10 appointments already scheduled     Sep 21, 2018 10:30 AM CDT   (Arrive by 10:15 AM)   Return Visit with MD AYALA [facility] Ear Nose and Throat ([facility])    [address] [facility]  [facility] [address] [phone]              Future tests that were ordered for you today     Open Future Orders        Priority Expected Expires Ordered    AUDIOLOGY ADULT REFERRAL Routine  3/20/2019 9/21/2018            Who to contact     Please call your clinic at [phone] to:    Ask questions about your health    Make or cancel appointments    Discuss your medicines    Learn about your test results    Speak to your doctor            Additional Information About Your Visit        Trueffecthart Information     Zubican gives you secure access to your electronic health record. If you see a primary care provider, you can also send messages to your care team and make appointments. If you have questions, please call your primary care clinic.  If you do not have a primary care provider, please call [phone] and they will assist you.      Zubican is an electronic gateway that provides easy, online access to your medical records. With Zubican, you can request a clinic appointment, read your test results, renew a prescription or communicate with your care team.     To access your existing account, please contact your [facility] or call [phone] for assistance.        Care EveryWhere ID     This is your Care EveryWhere ID. This could be used by other organizations to access your [place] medical records  [id number]         Blood Pressure from Last 3 Encounters:   06/05/18 125/90   01/17/18 107/64   11/21/12 123/84    Weight from Last 3 Encounters:   06/05/18 72.1 kg (159 lb)   03/20/18 70.3 kg (155 lb)   02/06/18 70.3 kg (155 lb)              We Performed the Following     [facility] Audiometry Thrshld Eval & Speech Recog ([address])     Reduced 52 - Tymps / Reflex   ([id number])        Primary Care Provider Fax #    [employer] [phone]       [address] [place] [address]        Equal Access to Services     DINORA [facility] Hospital[facility]USSEL : Hadii aad ku hadasho Soomaali, waaxda luqadaha, qaybta kaalmada adeegyada, rahel spain . So [facility] [phone].    ATENCIÓN: Si habla español, tiene a loyola disposición servicios gratuitos de asistencia lingüística. [facility] [phone].    We comply with applicable federal civil rights laws and [place] laws. We do not discriminate on the basis of race, color, national origin, age, disability, sex, sexual orientation, or gender identity.            Thank you!     Thank you for choosing [facility] AUDIOLOGY  for your care. Our goal is always to provide you with excellent care. Hearing back from our patients is one way we can continue to improve our services. Please take a few minutes to complete the written survey that you may receive in the mail after your visit with us. Thank you!             Your Updated Medication List - Protect others around you: Learn how to safely use, store and throw away your medicines at www.disposemymeds.org.          This list is accurate as of 9/21/18  9:48 AM.  Always use your most recent med list.                   Brand Name Dispense Instructions for use Diagnosis    calcium carbonate 500 mg-vitamin D 200 units 500-200 MG-UNIT  per tablet    OSCAL with D;OYSTER SHELL CALCIUM          ofloxacin 0.3 % ophthalmic solution    OCUFLOX    1 Bottle    Place 1-2 drops into the right eye 4 times daily    Anterior subcapsular polar senile cataract of right eye       * prednisoLONE acetate 1 % ophthalmic susp    PRED FORTE    1 Bottle    Place 1-2 drops into the right eye 4 times daily    Anterior subcapsular polar senile cataract of right eye       * prednisoLONE acetate 1 % ophthalmic susp    PRED FORTE    1 Bottle    Place 1 drop Into the left eye 3 times daily    Pseudophakia       * Notice:  This list has 2 medication(s) that are the same as other medications prescribed for you. Read the directions carefully, and ask your doctor or other care provider to review them with you.

## 2018-09-21 NOTE — PROGRESS NOTES
AUDIOLOGY REPORT    SUMMARY: Audiology visit completed. See audiogram for results.      RECOMMENDATIONS: Follow-up with ENT.        Gumaro Villanueva, CCC-A  Licensed Audiologist  [place] #[id number]

## 2018-09-21 NOTE — MR AVS SNAPSHOT
After Visit Summary   9/21/2018    Anna Carrington    MRN: [id number]           Patient Information     Date Of Birth          1965        Visit Information        Provider Department      9/21/2018 10:30 AM Anne Fang MD [facility]        Care Instructions    You will need  to schedule a follow up appointment in 1 year with a repeat hearing test.        Please call our clinic for any questions,concerns,or worsening symptoms.      Clinic #[phone]       Option 3  for the ENT Care Team.       Option 1 for scheduling.    Elvira العراقي RNCC  [phone]            Follow-ups after your visit        Your next 10 appointments already scheduled     Sep 20, 2019  9:30 AM CDT   Walk In From ENT with Angi Squires   [facility] ([facility])    [address] [place] [id number] [phone]            Sep 20, 2019 10:30 AM CDT   (Arrive by 10:15 AM)   Return Visit with MD AYALA Vieyra [facility] ([facility])    [address] [place] [id number] [phone]              Future tests that were ordered for you today     Open Future Orders        Priority Expected Expires Ordered    AUDIOLOGY ADULT REFERRAL Routine  3/20/2019 9/21/2018            Who to contact     Please call your clinic at [phone] to:    Ask questions about your health    Make or cancel appointments    Discuss your medicines    Learn about your test results    Speak to your doctor            Additional Information About Your Visit        MyChart Information     [employer] gives you secure access to your electronic health record. If you see a primary care provider, you can also send messages to your care team and make appointments. If you have questions, please call your primary care clinic.  If you do not have a primary care provider, please call [phone]  and they will assist you.      [employer] is an electronic gateway that provides easy, online access to your medical records. With [employer], you can request a clinic appointment, read your test results, renew a prescription or communicate with your care team.     To access your existing account, please contact your [facility] or call [phone] for assistance.        Care EveryWhere ID     This is your Care EveryWhere ID. This could be used by other organizations to access your [facility] medical records  [id number]         Blood Pressure from Last 3 Encounters:   06/05/18 125/90   01/17/18 107/64   11/21/12 123/84    Weight from Last 3 Encounters:   06/05/18 72.1 kg (159 lb)   03/20/18 70.3 kg (155 lb)   02/06/18 70.3 kg (155 lb)              Today, you had the following     No orders found for display         Today's Medication Changes          These changes are accurate as of 9/21/18 10:57 AM.  If you have any questions, ask your nurse or doctor.               Stop taking these medicines if you haven't already. Please contact your care team if you have questions.     ofloxacin 0.3 % ophthalmic solution   Commonly known as:  OCUFLOX   Stopped by:  Anne Fang MD           prednisoLONE acetate 1 % ophthalmic susp   Commonly known as:  PRED FORTE   Stopped by:  Anne Fang MD                    Primary Care Provider Fax #    [facility] [employer] [facility] [phone]       [address] [place], [place] [id number]        Equal Access to Services     BRADY SALDAÑA AH: Hadii cassia dominguezo Sospenser, waaxda luqadaha, qaybta kaalmada eleonora, rahel brennan. So [facility] [phone].    ATENCIÓN: Si habla español, tiene a loyola disposición servicios gratuitos de asistencia lingüística. Llame al [phone].    We comply with applicable federal civil rights laws and [place] laws. We do not discriminate on the basis of race, color, national origin, age,  disability, sex, sexual orientation, or gender identity.            Thank you!     Thank you for choosing [facility]  for your care. Our goal is always to provide you with excellent care. Hearing back from our patients is one way we can continue to improve our services. Please take a few minutes to complete the written survey that you may receive in the mail after your visit with us. Thank you!             Your Updated Medication List - Protect others around you: Learn how to safely use, store and throw away your medicines at www.disposemymeds.org.          This list is accurate as of 9/21/18 10:57 AM.  Always use your most recent med list.                   Brand Name Dispense Instructions for use Diagnosis    calcium carbonate 500 mg-vitamin D 200 units 500-200 MG-UNIT per tablet    OSCAL with D;OYSTER SHELL CALCIUM

## 2018-09-21 NOTE — LETTER
9/21/2018       RE: Anna Carrington  [address]  [place] [address]     Dear Colleague,    Thank you for referring your patient, Anna Carrington, to the [facility] at [facility]. Please see a copy of my visit note below.    I had the pleasure of seeing Anna Carrington today in follow-up in the Otology Clinic at the [facility].      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  On January 17, 2018, she had right partial stapedectomy, and a 4 mm Gyrus classic stapes prosthesis was placed.  She has noted improvement in hearing.  This hearing has been particularly helpful to her in numerous settings including crowded environments and when there are multiple talkers.  Overall, she has been pleased with this improvement.  We had a partial closure of the air-bone gap and while significantly better than the preoperative threshold (75 dB preoperative speech reception threshold), she has been pleased with this.  She does continue to have a sensation of a change in her taste.  This has not changed since her last visit.  She does not have any other associated symptoms.      PHYSICAL EXAMINATION:  She appeared well.  The tympanic membrane has healed, and the middle ear space is well-aerated.  Facial nerve function is normal.  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      AUDIOGRAM:  Her audiogram today demonstrates that the left ear has a normal sloping to mild sensorineural hearing loss with 25 dB speech reception threshold and 100% word recognition score.  On the right, there is a mild sloping to moderate mixed loss with 40 dB speech reception threshold and 100% word recognition score.  Left ipsilateral reflex is present.        IMPRESSION AND PLAN:  Ms.  Zeb is a 53-year-old woman who has had improvement in hearing following a stapes procedure.  Hearing is not symmetric with the left side but does approximate native bone line in many places which is also worse than left side bone conduction.  She has experienced significant benefit from the hearing improvement.  We have recommended monitoring on a yearly or every other year basis.  I would be happy to see her sooner if any challenges arise.        Anne Fang MD

## 2019-01-07 ENCOUNTER — ANCILLARY PROCEDURE (OUTPATIENT)
Dept: MAMMOGRAPHY | Facility: CLINIC | Age: 54
End: 2019-01-07
Attending: FAMILY MEDICINE
Payer: COMMERCIAL

## 2019-01-07 DIAGNOSIS — Z12.31 VISIT FOR SCREENING MAMMOGRAM: ICD-10-CM

## 2019-05-22 ENCOUNTER — HOSPITAL ENCOUNTER (EMERGENCY)
Facility: CLINIC | Age: 54
Discharge: HOME | End: 2019-05-22
Attending: EMERGENCY MEDICINE | Admitting: EMERGENCY MEDICINE
Payer: COMMERCIAL

## 2019-05-22 VITALS
HEART RATE: 85 BPM | TEMPERATURE: 97.3 F | RESPIRATION RATE: 18 BRPM | DIASTOLIC BLOOD PRESSURE: 78 MMHG | OXYGEN SATURATION: 98 % | SYSTOLIC BLOOD PRESSURE: 116 MMHG

## 2019-05-22 DIAGNOSIS — R42 VERTIGO: ICD-10-CM

## 2019-05-22 LAB
ALBUMIN SERPL-MCNC: 4 G/DL (ref 3.4–5)
ALP SERPL-CCNC: 64 U/L (ref 40–150)
ALT SERPL W P-5'-P-CCNC: 25 U/L (ref 0–50)
ANION GAP SERPL CALCULATED.3IONS-SCNC: 7 MMOL/L (ref 3–14)
AST SERPL W P-5'-P-CCNC: 22 U/L (ref 0–45)
BASOPHILS # BLD AUTO: 0 10E9/L (ref 0–0.2)
BASOPHILS NFR BLD AUTO: 0.6 %
BILIRUB SERPL-MCNC: 0.9 MG/DL (ref 0.2–1.3)
BUN SERPL-MCNC: 20 MG/DL (ref 7–30)
CALCIUM SERPL-MCNC: 9.1 MG/DL (ref 8.5–10.1)
CHLORIDE SERPL-SCNC: 108 MMOL/L (ref 94–109)
CO2 SERPL-SCNC: 24 MMOL/L (ref 20–32)
CREAT SERPL-MCNC: 0.78 MG/DL (ref 0.52–1.04)
DIFFERENTIAL METHOD BLD: NORMAL
EOSINOPHIL # BLD AUTO: 0.2 10E9/L (ref 0–0.7)
EOSINOPHIL NFR BLD AUTO: 3.2 %
ERYTHROCYTE [DISTWIDTH] IN BLOOD BY AUTOMATED COUNT: 12.8 % (ref 10–15)
GFR SERPL CREATININE-BSD FRML MDRD: 86 ML/MIN/{1.73_M2}
GLUCOSE SERPL-MCNC: 118 MG/DL (ref 70–99)
HCT VFR BLD AUTO: 41.2 % (ref 35–47)
HGB BLD-MCNC: 13.1 G/DL (ref 11.7–15.7)
IMM GRANULOCYTES # BLD: 0 10E9/L (ref 0–0.4)
IMM GRANULOCYTES NFR BLD: 0.8 %
LYMPHOCYTES # BLD AUTO: 1.8 10E9/L (ref 0.8–5.3)
LYMPHOCYTES NFR BLD AUTO: 33.3 %
MCH RBC QN AUTO: 29.2 PG (ref 26.5–33)
MCHC RBC AUTO-ENTMCNC: 31.8 G/DL (ref 31.5–36.5)
MCV RBC AUTO: 92 FL (ref 78–100)
MONOCYTES # BLD AUTO: 0.5 10E9/L (ref 0–1.3)
MONOCYTES NFR BLD AUTO: 8.7 %
NEUTROPHILS # BLD AUTO: 2.8 10E9/L (ref 1.6–8.3)
NEUTROPHILS NFR BLD AUTO: 53.4 %
NRBC # BLD AUTO: 0 10*3/UL
NRBC BLD AUTO-RTO: 0 /100
PLATELET # BLD AUTO: 182 10E9/L (ref 150–450)
POTASSIUM SERPL-SCNC: 3.8 MMOL/L (ref 3.4–5.3)
PROT SERPL-MCNC: 7.7 G/DL (ref 6.8–8.8)
RBC # BLD AUTO: 4.49 10E12/L (ref 3.8–5.2)
SODIUM SERPL-SCNC: 139 MMOL/L (ref 133–144)
WBC # BLD AUTO: 5.3 10E9/L (ref 4–11)

## 2019-05-22 PROCEDURE — 80053 COMPREHEN METABOLIC PANEL: CPT | Performed by: EMERGENCY MEDICINE

## 2019-05-22 PROCEDURE — 99284 EMERGENCY DEPT VISIT MOD MDM: CPT | Mod: 25

## 2019-05-22 PROCEDURE — 25000132 ZZH RX MED GY IP 250 OP 250 PS 637: Performed by: EMERGENCY MEDICINE

## 2019-05-22 PROCEDURE — 25000128 H RX IP 250 OP 636: Performed by: EMERGENCY MEDICINE

## 2019-05-22 PROCEDURE — 96374 THER/PROPH/DIAG INJ IV PUSH: CPT

## 2019-05-22 PROCEDURE — 99284 EMERGENCY DEPT VISIT MOD MDM: CPT | Mod: Z6 | Performed by: EMERGENCY MEDICINE

## 2019-05-22 PROCEDURE — 85025 COMPLETE CBC W/AUTO DIFF WBC: CPT | Performed by: EMERGENCY MEDICINE

## 2019-05-22 RX ORDER — MECLIZINE HCL 12.5 MG 12.5 MG/1
25 TABLET ORAL 3 TIMES DAILY PRN
Qty: 6 TABLET | Refills: 0 | Status: SHIPPED | OUTPATIENT
Start: 2019-05-22

## 2019-05-22 RX ORDER — ONDANSETRON 4 MG/1
4 TABLET, FILM COATED ORAL EVERY 8 HOURS PRN
Qty: 6 TABLET | Refills: 0 | Status: SHIPPED | OUTPATIENT
Start: 2019-05-22

## 2019-05-22 RX ORDER — ONDANSETRON 2 MG/ML
4 INJECTION INTRAMUSCULAR; INTRAVENOUS ONCE
Status: COMPLETED | OUTPATIENT
Start: 2019-05-22 | End: 2019-05-22

## 2019-05-22 RX ORDER — MECLIZINE HYDROCHLORIDE 25 MG/1
50 TABLET ORAL ONCE
Status: COMPLETED | OUTPATIENT
Start: 2019-05-22 | End: 2019-05-22

## 2019-05-22 RX ADMIN — ONDANSETRON 4 MG: 2 INJECTION INTRAMUSCULAR; INTRAVENOUS at 14:09

## 2019-05-22 RX ADMIN — MECLIZINE HYDROCHLORIDE 50 MG: 25 TABLET ORAL at 14:08

## 2019-05-22 ASSESSMENT — ENCOUNTER SYMPTOMS
VOMITING: 1
DIZZINESS: 1
TROUBLE SWALLOWING: 0
HEADACHES: 0
FACIAL ASYMMETRY: 0
NAUSEA: 1
WEAKNESS: 0
RHINORRHEA: 0
LIGHT-HEADEDNESS: 0
SPEECH DIFFICULTY: 0
COUGH: 0

## 2019-05-22 NOTE — CONSULTS
"Otolaryngology Consult Note  May 22, 2019      CC: Dizziness     HPI: Anna Carrington is a 53 year old female with a past medical history of right otosclerosis and ossicular malformation s/p right partial stepedectomy and prosthesis placement on 1/17/18 who we were asked to evaluate for new onset dizziness. The patient notes that she was walking to the water cooler at work today when she became very dizzy and noted a vertigo sensation. At that same time she had difficulty walking, became nauseous and had two episodes of vomiting. She presented to the ED where she was given meclizine and zofran. Upon our interview the patient notes that her symptoms have improved and she now only feels mildly dizzy. She also feels that her stomach is \"unsettled\". At this time and during this event she denies hearing loss, headache, changes in vision, difficulty breathing or swallowing, focal weakness or numbness, dysarthria, fever or chills. She has not started or stopped any medications recently, she denies trauma, does not have any sick contacts, and does not report and recent illness of her own. The episode was severe for 1 hr and then improved some but has been constant since. Laying still helps improve symptoms but changes in position don't necessarily make it worse.     Past Medical History:   Diagnosis Date     CNS vasculitis (H)      Dysphonia 2016    Difficulty with my voice (persistent throat clearing, cough,     Hearing problem      Hoarseness 2016    difficulty singing, tiredness has been going on for a year+.     Numbness     heel of foot     PONV (postoperative nausea and vomiting)     with general anesthesia     Tinnitus 5 years ago?     Weakness        Past Surgical History:   Procedure Laterality Date     BIOPSY OF SKIN LESION  2011     CATARACT IOL, RT/LT Right 06/05/2018     CATARACT IOL, RT/LT Left 07/17/2018     ENT SURGERY       LASER DIODE STAPEDECTOMY Right 1/17/2018    Procedure: LASER DIODE STAPEDECTOMY; "  Right Diode Laser Stapedectomy;  Surgeon: Anne Fang MD;  Location: [facility] OR     PHACOEMULSIFICATION CLEAR CORNEA WITH TORIC INTRAOCULAR LENS IMPLANT Right 6/5/2018    Procedure: PHACOEMULSIFICATION CLEAR CORNEA WITH TORIC INTRAOCULAR LENS IMPLANT;  RIGHT EYE PHACOEMULSIFICATION CLEAR CORNEA WITH TORIC INTRAOCULAR LENS IMPLANT ;  Surgeon: Yuriy Vincent MD;  Location: [facility]     retinal detachment surgery  July 4, 2008     RETINAL REATTACHMENT Right 7-4-2008     THORACOSCOPIC BIOPSY LUNG  2001       Current Outpatient Medications   Medication Sig Dispense Refill     Calcium carb-Vitamin D 500 mg [place]-200 units (OYSTER SHELL CALCIUM/D) 500-200 MG-UNIT per tablet        meclizine (ANTIVERT) 12.5 MG tablet Take 2 tablets (25 mg) by mouth 3 times daily as needed for dizziness 6 tablet 0     ondansetron (ZOFRAN) 4 MG tablet Take 1 tablet (4 mg) by mouth every 8 hours as needed for nausea 6 tablet 0          Allergies   Allergen Reactions     Minocycline Other (See Comments)     Vasculitis in the brain.     Tetracycline Other (See Comments)     Lesions on the brain, right sided paralysis        Social History     Socioeconomic History     Marital status:      Spouse name: Not on file     Number of children: Not on file     Years of education: Not on file     Highest education level: Not on file   Occupational History     Not on file   Social Needs     Financial resource strain: Not on file     Food insecurity:     Worry: Not on file     Inability: Not on file     Transportation needs:     Medical: Not on file     Non-medical: Not on file   Tobacco Use     Smoking status: Never Smoker     Smokeless tobacco: Never Used   Substance and Sexual Activity     Alcohol use: Yes     Comment: 2 glasses of wine or beer 2 times per week     Drug use: No     Sexual activity: Not Currently     Partners: Male     Birth control/protection: None   Lifestyle     Physical activity:     Days per week: Not on file      Minutes per session: Not on file     Stress: Not on file   Relationships     Social connections:     Talks on phone: Not on file     Gets together: Not on file     Attends Yazdanism service: Not on file     Active member of club or organization: Not on file     Attends meetings of clubs or organizations: Not on file     Relationship status: Not on file     Intimate partner violence:     Fear of current or ex partner: Not on file     Emotionally abused: Not on file     Physically abused: Not on file     Forced sexual activity: Not on file   Other Topics Concern     Parent/sibling w/ CABG, MI or angioplasty before 65F 55M? Not Asked   Social History Narrative     Not on file       Family History   Problem Relation Age of Onset     Neurologic Disorder Sister         MS     Neurologic Disorder Father         MS     Retinal detachment Father      Cancer Father         colon     Cancer Mother         breast     Macular Degeneration Maternal Grandfather      Glaucoma No family hx of        ROS: 12 point review of systems is negative unless noted in HPI.    PHYSICAL EXAM:  General: laying in bed, no acute distress  /68   Pulse 64   Temp 97.3  F (36.3  C) (Oral)   Resp 16   SpO2 98%   HEAD: normocephalic, atraumatic  Face: symmetrical, CN VII intact bilaterally (HB 1), no swelling, edema, or erythema. Sensation V1-V3 intact and equal bilaterally.   Eyes: EOMI without spontaneous or gaze evoked nystagmus, PERRL, clear sclera  Ears: no tragal tenderness, external ear canal open and clear bilaterally, TMs clear bilaterally  Mouth: moist, no ulcers, no jaw or tooth tenderness, tongue midline and symmetric  Neuro: cranial nerves 2-12 grossly intact, strength 5/5 throughout, sensation intact throughout, good performance on finger to nose testing.   Respiratory: breathing non-labored on RA, no stridor  Skin: no rashes or skin lesions of the face/neck  Psych: pleasant affect  Cardio: extremities warm and well perfused      ROUTINE IP LABS (Last four results)  BMP  Recent Labs   Lab 05/22/19  1350      POTASSIUM 3.8   CHLORIDE 108   ALEJANDRO 9.1   CO2 24   BUN 20   CR 0.78   *     CBC  Recent Labs   Lab 05/22/19  1350   WBC 5.3   RBC 4.49   HGB 13.1   HCT 41.2   MCV 92   MCH 29.2   MCHC 31.8   RDW 12.8        INRNo lab results found in last 7 days.    Imaging:  Results for orders placed or performed in visit on 01/07/19   MA Screen Bilateral w/Thierry    Narrative    EXAM: Bilateral digital screening mammography with computer aided  detection and with tomosynthesis    TECHNIQUE: Tomosynthesis    History/family history: Asymptomatic screening mammogram. History of  surgical excision of fibroadenoma on the right and surgical excision  of ducts within the left breast. First-degree relative with history of  breast cancer, diagnosed at age 32. Second degree relative with  history of breast cancer diagnosed at age 70. Lifetime calculated risk  of of breast cancer of 23%.    Comparison: Screening mammogram 12/19/2017, 11/29/2016 11/17/2015.  Diagnostic mammogram 5/6/2013.    BREAST DENSITY: Extremely dense.    Findings: Biopsy clip within the left breast. No significant change in  either breast.      Impression    IMPRESSION: BI-RADS CATEGORY: 2 - Benign Finding(s).    RECOMMENDED FOLLOW-UP: Annual Mammography.    Based on the patient history questionnaire completed prior to the  mammogram, the  NCI risk calculator and the NCCN indications for genetic screening,  the patient may be at an increased risk for breast cancer and/or  have an indication for genetic screening.  She has been sent a letter  informing her of this and a phone number to schedule an appointment in  the [facility] if she so desires.    Code: MD DONNA    Results to be sent to the patient.     I have personally reviewed the examination and initial interpretation  and I agree with the findings.    SHANE DEL CID MD         Assessment and Plan  Anna MOTA  Zeb is a 53 year old female with a past medical history of right otosclerosis s/p right stapedectomy with prosthesis. The patient presents with an episode of persistent dizziness/vertigo. She appears to have improved some and has a very benign physical exam. Her history is unremarkable, and there does not appear to be evidence of prosthesis malposition or disarticulation as she reports no significant change in hearing, thus this is less likely. There is no clear source for her dizziness at this time. There is also no acute intervention needed or further testing from an ENT standpoint. We have instructed the patient to notify us tomorrow if her symptoms have persisted. If they have she is to call ENT clinic to set up an appointment to be evaluated. If her symptoms have improved she does not need to be evaluated early as she has a follow up with an audiogram scheduled in September with Dr. Fang. It is okay for her to have zofran and meclizine to discharge with incase there is further nausea and dizziness tonight, however, patient should not take meclizine for longer than 24 hrs. If she needs it past that time we would like her to call clinic to be evaluated    - No intervention or further work up at this time   - Patient to notify ENT clinic tomorrow if symptoms have persisted - if so she should schedule clinic appointment to be evaluated   - Okay for zofran and meclizine - if needing meclizine for >24 hrs please notify clinic   - Call ENT with questions or concerns     Danis Roman PGY1   Otolaryngology-Head & Neck Surgery  Please page ENT with questions by dialing [phone] and entering job code [phone] when prompted.    I, Anne Fang, discussed this patient with the resident/fellow at the time of consultation. I have reviewed the note, labs, imaging and am in agreement with the assessment and plan. I talked to the patient when I saw her at the discharge pharmacy, but not see the patient while she was in  the ED.

## 2019-05-22 NOTE — ED NOTES
Sign out Provider: Jos  Sign out Plan: 33-year-old female presenting to the emergency department with dizziness.  Patient was seen by neurology and felt to have peripheral vertigo.  Patient has had a recent ENT surgery and was seen by ENT.  Plan to follow-up pending ENT recommendations and follow-up on symptoms.  If symptoms have improved plan to discharged home otherwise admit for symptomatic management.    Reassessment: Patient has been seen by ENT and no acute surgical concerns.  Recommended symptomatic management for peripheral vertigo.  On reassessment patient reports she is feeling improved.  She would like to try symptom management at home.  She was given a prescription for meclizine with instructions to take no more than 24 hours.  ENT will call her tomorrow to follow-up on symptoms.  She was also given Zofran for nausea.  She was given close return instructions for the emergency department and voiced understanding.    Disposition: Discharge           Adeline Blair MD  05/22/19 0714

## 2019-05-22 NOTE — ED PROVIDER NOTES
"  History     Chief Complaint   Patient presents with     Dizziness     The history is provided by the patient.     Anna Carrington is a 53 year old female with a history of right otosclerosis s/p right diode laser stapedectomy (1/17/19) and ossicular malformation, who presents to the Emergency Department with complaints of dizziness and nausea onset one hour ago. She was brought to the ED by some friends. The patient was at work on the computer when she suddenly became \"dizzy\". She notes she had trouble walking straight, and reports putting her head down with her eyes closed with some improvement and then when lifting her head she became dizzy again and vomited. She states it feels like the room is spinning around. She denies past history of this. She is currently nauseous, and dizzy and vomited 1x during evaluation. She denies headache, chest pain, or shortness of breath. She denies lightheadedness, headache, slurred speech, speech difficulty, facial asymmetry, difficulty swallowing, weakness, or any other associated neurologic deficit. Denies recent cough, runny nose, tinnitus.  She has baseline decreased hearing on the right due to her previous surgery.    I have reviewed the Medications, Allergies, Past Medical and Surgical History, and Social History in the [employer] system.  Past Medical History:   Diagnosis Date     CNS vasculitis (H)      Dysphonia 2016    Difficulty with my voice (persistent throat clearing, cough,     Hearing problem      Hoarseness 2016    difficulty singing, tiredness has been going on for a year+.     Numbness     heel of foot     PONV (postoperative nausea and vomiting)     with general anesthesia     Tinnitus 5 years ago?     Weakness        Past Surgical History:   Procedure Laterality Date     BIOPSY OF SKIN LESION  2011     CATARACT IOL, RT/LT Right 06/05/2018     CATARACT IOL, RT/LT Left 07/17/2018     ENT SURGERY       LASER DIODE STAPEDECTOMY Right 1/17/2018    Procedure: LASER " DIODE STAPEDECTOMY;  Right Diode Laser Stapedectomy;  Surgeon: Anne Fang MD;  Location: [facility] OR     PHACOEMULSIFICATION CLEAR CORNEA WITH TORIC INTRAOCULAR LENS IMPLANT Right 6/5/2018    Procedure: PHACOEMULSIFICATION CLEAR CORNEA WITH TORIC INTRAOCULAR LENS IMPLANT;  RIGHT EYE PHACOEMULSIFICATION CLEAR CORNEA WITH TORIC INTRAOCULAR LENS IMPLANT ;  Surgeon: Yuriy Vincent MD;  Location: [facility]     retinal detachment surgery  July 4, 2008     RETINAL REATTACHMENT Right 7-4-2008     THORACOSCOPIC BIOPSY LUNG  2001       Family History   Problem Relation Age of Onset     Neurologic Disorder Sister         MS     Neurologic Disorder Father         MS     Retinal detachment Father      Cancer Father         colon     Cancer Mother         breast     Macular Degeneration Maternal Grandfather      Glaucoma No family hx of        Social History     Tobacco Use     Smoking status: Never Smoker     Smokeless tobacco: Never Used   Substance Use Topics     Alcohol use: Yes     Comment: 2 glasses of wine or beer 2 times per week       No current facility-administered medications for this encounter.      Current Outpatient Medications   Medication     Calcium carb-Vitamin D 500 mg Tribe-200 units (OYSTER SHELL CALCIUM/D) 500-200 MG-UNIT per tablet     meclizine (ANTIVERT) 12.5 MG tablet     ondansetron (ZOFRAN) 4 MG tablet        Allergies   Allergen Reactions     Minocycline Other (See Comments)     Vasculitis in the brain.     Tetracycline Other (See Comments)     Lesions on the brain, right sided paralysis        Review of Systems   HENT: Negative for rhinorrhea and trouble swallowing.    Respiratory: Negative for cough.    Gastrointestinal: Positive for nausea and vomiting.   Neurological: Positive for dizziness. Negative for facial asymmetry, speech difficulty, weakness, light-headedness and headaches.   All other systems reviewed and are negative.      Physical Exam   BP: 119/74  Pulse: 64  Temp: 97.7  F  (36.5  C)  Resp: 16  SpO2: 100 %      Physical Exam  General: awake, alert, NAD  Head: normal cephalic  HEENT: pupils equal, conjugate gaze in tact, no nystagmus appreciated.  Neck: Supple  CV: regular rate and rhythm without murmur  Lungs: clear to ascultation  Abd: soft, non-tender, no guarding, no peritoneal signs  EXT: lower extremities without swelling or edema  Neuro: awake, answers questions appropriately.  When attempting to perform hints exam patient became extremely symptomatic and vomited.  She is 5 out of 5 strength in bilateral upper and lower extremities.  Cranial nerves are intact.  No obvious nystagmus appreciated on exam.  She has normal speech, normal word finding, able to follow commands easily.      ED Course        Procedures            Labs Ordered and Resulted from Time of ED Arrival Up to the Time of Departure from the ED   COMPREHENSIVE METABOLIC PANEL - Abnormal; Notable for the following components:       Result Value    Glucose 118 (*)     All other components within normal limits   CBC WITH PLATELETS DIFFERENTIAL       Consults  ENT: Called (05/22/19 2710)  Neurology: Responded (05/22/19 5619)    Assessments & Plan (with Medical Decision Making)   Anna Granados is a 53-year-old female who presents with vertiginous symptoms. On physical exam, the patient has 5/5 strength in all extremities and is not complaining of any other neurologic symptoms aside from the acute onset vertigo. Furthermore, with attempt a hins test for hints exam, when moving her head she became acutely symptomatic and vomited. Given the severity of her symptoms my suspicion is that this is peripheral vertigo is high, given age would also consider the possibility of central vertigo.  I did consult neurology to see if they had any concern for stroke, they came and evaluated the patient, felt this was not central vertigo and did not think an MRI would be useful.  In the ER she was given meclizine and Zofran.      Patient denies chest pain, shortness of breath, presyncopal symptoms, and she has normal vital signs and she very clearly describes a vertiginous sensation and I do not think that she needs a syncopal work up and will not proceed with such.     Patient does have a history of ENT surgery, neurology was able to isolate her symptoms to her right side and feel this is likely the cause of her peripheral vertigo recommended ENT consult. ENT consult is currently pending.  Patient be signed out to Dr. Mir who will follow up on ENT recommendations.  Patient will require road testing prior to discharge if she still acutely symptomatic she will may need admission for ongoing symptomatic management.    This part of the medical record was transcribed by Eleonora Hong, Medical Scribe, from a dictation done by Dr. Arguello.   I have reviewed the nursing notes.    I have reviewed the findings, diagnosis, plan and need for follow up with the patient.       Medication List      Started    meclizine 12.5 MG tablet  Commonly known as:  ANTIVERT  25 mg, Oral, 3 TIMES DAILY PRN     ondansetron 4 MG tablet  Commonly known as:  ZOFRAN  4 mg, Oral, EVERY 8 HOURS PRN            Final diagnoses:   Vertigo   Eleonora GRAY, am serving as a trained medical scribe to document services personally performed by Gary Arguello MD, based on the provider's statements to me.   Gary GRAY MD, was physically present and have reviewed and verified the accuracy of this note documented by Eleonora Hong.      5/22/2019   [facility], [place], EMERGENCY DEPARTMENT     Gary Arguello MD  05/23/19 0931

## 2019-05-22 NOTE — ED AVS SNAPSHOT
[facility], [place], Emergency Department  [address] [place] [id number]  Phone:  [phone]                                    Anna Carrington   MRN: [id number]    Department:  [facility], Emergency Department   Date of Visit:  5/22/2019           After Visit Summary Signature Page    I have received my discharge instructions, and my questions have been answered. I have discussed any challenges I see with this plan with the nurse or doctor.    ..........................................................................................................................................  Patient/Patient Representative Signature      ..........................................................................................................................................  Patient Representative Print Name and Relationship to Patient    ..................................................               ................................................  Date                                   Time    ..........................................................................................................................................  Reviewed by Signature/Title    ...................................................              ..............................................  Date                                               Time          22EPIC Rev 08/18

## 2019-05-22 NOTE — ED TRIAGE NOTES
Pt presents ambulatory to triage from home. Pt states for past hour has felt dizzy with nausea, emesis and loose stools. Pt denies headache, chest pain ans SOB. States feels like room is spinning around Pt. Denies recent illnesses. Denies hx of similar issues.

## 2019-05-22 NOTE — DISCHARGE INSTRUCTIONS
Please make an appointment to follow [facility] with [facility] (phone: [phone] in 2-3 days if symptoms have not fully resolved.  One of the nurses from ENT clinic will be calling you tomorrow to follow-up and determine if additional ENT clinic follow-up is needed.      You may use meclizine as needed for dizziness for no more than 24 hours.  If you need to take it, the next dose would be due a 8:00pm.  You may use zofran as needed for nausea.      If you have any worsening symptoms, intractable vomiting, severe headache, weakness or other concerns, return to the emergency department for re-evaluation.

## 2019-05-23 ENCOUNTER — TELEPHONE (OUTPATIENT)
Dept: OTOLARYNGOLOGY | Facility: CLINIC | Age: 54
End: 2019-05-23

## 2019-05-23 NOTE — TELEPHONE ENCOUNTER
Pt called regarding recent ED visit to f/o on symptoms.   Pt stated she is still having the low level vertigo that she had when leaving the ED last evening. Instructed pt to utilize the Zofran provided and Meclizine t/o today and call if symptoms remain bothersome tomorrow. Given direct line for additional questions/concerns.       Natice Schwab, RN BSN

## 2019-09-04 DIAGNOSIS — H91.90 HEARING LOSS: Primary | ICD-10-CM

## 2019-09-20 ENCOUNTER — OFFICE VISIT (OUTPATIENT)
Dept: AUDIOLOGY | Facility: CLINIC | Age: 54
End: 2019-09-20
Attending: OTOLARYNGOLOGY
Payer: COMMERCIAL

## 2019-09-20 ENCOUNTER — OFFICE VISIT (OUTPATIENT)
Dept: OTOLARYNGOLOGY | Facility: CLINIC | Age: 54
End: 2019-09-20
Attending: OTOLARYNGOLOGY
Payer: COMMERCIAL

## 2019-09-20 VITALS
HEART RATE: 80 BPM | SYSTOLIC BLOOD PRESSURE: 131 MMHG | WEIGHT: 164.8 LBS | BODY MASS INDEX: 25.24 KG/M2 | DIASTOLIC BLOOD PRESSURE: 86 MMHG

## 2019-09-20 DIAGNOSIS — H91.90 HEARING LOSS: ICD-10-CM

## 2019-09-20 DIAGNOSIS — H80.91 OTOSCLEROSIS OF RIGHT EAR: Primary | ICD-10-CM

## 2019-09-20 DIAGNOSIS — H90.A31 MIXED CONDUCTIVE AND SENSORINEURAL HEARING LOSS OF RIGHT EAR WITH RESTRICTED HEARING OF LEFT EAR: Primary | ICD-10-CM

## 2019-09-20 DIAGNOSIS — H90.A22 SENSORINEURAL HEARING LOSS (SNHL) OF LEFT EAR WITH RESTRICTED HEARING OF RIGHT EAR: ICD-10-CM

## 2019-09-20 ASSESSMENT — PAIN SCALES - GENERAL: PAINLEVEL: NO PAIN (0)

## 2019-09-20 NOTE — LETTER
2019       RE: Anna Carrington  [address]  [place] [address]     Dear Colleague,    Thank you for referring your patient, Anna Carrington, to the [facility] EAR NOSE AND THROAT at [facility]. Please see a copy of my visit note below.      Neurotology Clinic      Name: Anna Carrington  MRN: [id number]  Age: 54 year old  : 2019      Chief Complaint:   RECHECK     History of Present Illness:   Anna Carrington is a 54 year old female with a history of right otosclerosis and ossicular malformation noted at time of surgery. On 18, she had a right partial stapedectomy and a 4 mm Gyrus classic stapes prosthesis was placed. I last saw the patient on 18 and she had improvement of hearing following this procedure. She presents for follow up.     Since her last visit, she did have an ED visit in May due to an episode of vertigo with nausea, vomiting and dizziness. This took a few days to resolve completely and has not recurred. She had not had similar symptoms before. The patient also reports constant bilateral tinnitus, seems like right is greater than left. She does not feel this is significantly worse than prior but is exacerbated by headaches or feeling unwell. She does not have history of migraines. The patient has otherwise felt quite well, denies hearing loss.      Review of Systems:   Pertinent items are noted in HPI or as in patient entered ROS below, remainder of complete ROS is negative.    ENT ROS 2019   Ears, Nose, Throat Ringing/noise in ears   Cardiopulmonary -   Musculoskeletal -   Allergy/Immunology -   Skin -         Physical Exam:   /86   Pulse 80   Wt 74.8 kg (164 lb 12.8 oz)   BMI 25.24 kg/m        Constitutional:  The patient was unaccompanied, well-groomed, and in no acute distress.     Skin: Normal:  warm and pink without rash   Neurologic: Alert and oriented x 3.  CN's III-XII within normal  limits.  Voice normal.    Psychiatric: The patient's affect was calm, cooperative, and appropriate.     Communication:  Normal; communicates verbally, normal voice quality.   Respiratory: Breathing comfortably without stridor or exertion of accessory muscles.    Head/Face:  No lesions or scars.    Ears: Pinnae and tragus non-tender.  EAC's and TM's were clear.        Otologic microscope exam:  Right ear: Right tympanic membrane intact, prosthesis visualized, no sign of retraction.   Left ear: EAC clear and TM intact, healthy appearing.      Audiogram:  AUDIOGRAM: She underwent an audiogram today. This demonstrated:  Left- normal sloping to mild (presumably SN) HL; 5 dB improvement 250-3000 Hz. Right- mild sloping to severe mixed hearing  loss; 10 dB improvement 1000 Hz air conduction, 10-15 dB decrease 2980-2236 Hz air conduction and 10-25 dB improvement bone  conduction 2000 and 4000 Hz. 100% word rec. bilaterally. Normal left tymp, did not assess right due to Hx of stapedectomy.    The speech reception threshold is 35 dB on the right, 15 dB on the left, with 100% word recognition.     Assessment and Plan:  Anna Carrington is a 54 year old female with history of right otosclerosis status post right partial stapedectomy with prosthesis placement. She did have an episode of vertigo a little over 3 months ago with dysequilibrium that lasted a little over a day, we discussed possible etiologies of this and I currently favor BPPV over vestibular migraine or Meniere's. I recommended audiogram if this reoccurs with associated increase tinnitus. Her audiogram today is very reassuring with great left hearing, no sign of left otosclerosis. Her right prosthesis is in place and she reports stable hearing there. She will follow up for recheck in a year or sooner with new or concerning symptoms.      Anne Fang MD  Otology & Neurotology  [facility]    The documentation recorded by the scribe accurately  reflects the services I personally performed and the decisions made by me.      Scribe Disclosure:  I, Loida Arechiga, am serving as a scribe to document services personally performed by Anne Fang MD at this visit, based upon the provider's statements to me. All documentation has been reviewed by the aforementioned provider prior to being entered into the official medical record.    Again, thank you for allowing me to participate in the care of your patient.      Sincerely,    Anne Fang MD

## 2019-09-20 NOTE — NURSING NOTE
Chief Complaint   Patient presents with     RECHECK     1 year follow up visit with HAL Resendez LPN

## 2019-09-20 NOTE — PATIENT INSTRUCTIONS
1. You were seen in the ENT Clinic today by .  If you have any questions or concerns after your appointment, please call   - Option 1: ENT Clinic: [phone]  - Option 2: Fatimah (' Nurse): [phone]    2.   Plan to return to clinic one year with a new hearing test.     NHAN Sheridan  [facility] Otolaryngology  [phone]

## 2019-09-20 NOTE — PROGRESS NOTES
Neurotology Clinic      Name: Anna Carrington  MRN: [id number]  Age: 54 year old  : 2019      Chief Complaint:   RECHECK     History of Present Illness:   Anna Carrington is a 54 year old female with a history of right otosclerosis and ossicular malformation noted at time of surgery. On 18, she had a right partial stapedectomy and a 4 mm Gyrus classic stapes prosthesis was placed. I last saw the patient on 18 and she had improvement of hearing following this procedure. She presents for follow up.     Since her last visit, she did have an ED visit in May due to an episode of vertigo with nausea, vomiting and dizziness. This took a few days to resolve completely and has not recurred. She had not had similar symptoms before. The patient also reports constant bilateral tinnitus, seems like right is greater than left. She does not feel this is significantly worse than prior but is exacerbated by headaches or feeling unwell. She does not have history of migraines. The patient has otherwise felt quite well, denies hearing loss.      Review of Systems:   Pertinent items are noted in HPI or as in patient entered ROS below, remainder of complete ROS is negative.    ENT ROS 2019   Ears, Nose, Throat Ringing/noise in ears   Cardiopulmonary -   Musculoskeletal -   Allergy/Immunology -   Skin -         Physical Exam:   /86   Pulse 80   Wt 74.8 kg (164 lb 12.8 oz)   BMI 25.24 kg/m       Constitutional:  The patient was unaccompanied, well-groomed, and in no acute distress.     Skin: Normal:  warm and pink without rash   Neurologic: Alert and oriented x 3.  CN's III-XII within normal limits.  Voice normal.    Psychiatric: The patient's affect was calm, cooperative, and appropriate.     Communication:  Normal; communicates verbally, normal voice quality.   Respiratory: Breathing comfortably without stridor or exertion of accessory muscles.    Head/Face:  No lesions or scars.     Ears: Pinnae and tragus non-tender.  EAC's and TM's were clear.        Otologic microscope exam:  Right ear: Right tympanic membrane intact, prosthesis visualized, no sign of retraction.   Left ear: EAC clear and TM intact, healthy appearing.      Audiogram:  AUDIOGRAM: She underwent an audiogram today. This demonstrated:  Left- normal sloping to mild (presumably SN) HL; 5 dB improvement 250-3000 Hz. Right- mild sloping to severe mixed hearing  loss; 10 dB improvement 1000 Hz air conduction, 10-15 dB decrease 1001-6935 Hz air conduction and 10-25 dB improvement bone  conduction 2000 and 4000 Hz. 100% word rec. bilaterally. Normal left tymp, did not assess right due to Hx of stapedectomy.    The speech reception threshold is 35 dB on the right, 15 dB on the left, with 100% word recognition.     Assessment and Plan:  Anna Carrington is a 54 year old female with history of right otosclerosis status post right partial stapedectomy with prosthesis placement. She did have an episode of vertigo a little over 3 months ago with dysequilibrium that lasted a little over a day, we discussed possible etiologies of this and I currently favor BPPV over vestibular migraine or Meniere's. I recommended audiogram if this reoccurs with associated increase tinnitus. Her audiogram today is very reassuring with great left hearing, no sign of left otosclerosis. Her right prosthesis is in place and she reports stable hearing there. She will follow up for recheck in a year or sooner with new or concerning symptoms.      Anne Fang MD  Otology & Neurotology  [facility]    The documentation recorded by the scribe accurately reflects the services I personally performed and the decisions made by me.      Scribe Disclosure:  I, Loida Arechiga, am serving as a scribe to document services personally performed by Anne Fang MD at this visit, based upon the provider's statements to me. All documentation has been  reviewed by the aforementioned provider prior to being entered into the official medical record.

## 2019-09-20 NOTE — PROGRESS NOTES
AUDIOLOGY REPORT    SUMMARY: Audiology visit completed. See audiogram for results.      RECOMMENDATIONS: Follow-up with ENT.      Laurie Driver.  Licensed Audiologist  [place] #[id number]

## 2019-09-26 NOTE — TELEPHONE ENCOUNTER
1.  Date/reason for appt:  6/19/17   Vocal Cord Issues    2.  Referring provider:  Self    3.  Call to patient (Yes / No - short description):  No, established pt.  Last seen for voice issues in 2012.    Records reviewed.  All records are in Epic   RT called x3. Need RT for patient education with inhaler.

## 2019-11-02 ENCOUNTER — HEALTH MAINTENANCE LETTER (OUTPATIENT)
Age: 54
End: 2019-11-02

## 2019-11-15 ENCOUNTER — OFFICE VISIT (OUTPATIENT)
Dept: OPHTHALMOLOGY | Facility: CLINIC | Age: 54
End: 2019-11-15
Attending: OPHTHALMOLOGY
Payer: COMMERCIAL

## 2019-11-15 DIAGNOSIS — Z96.1 PSEUDOPHAKIA OF BOTH EYES: Primary | ICD-10-CM

## 2019-11-15 DIAGNOSIS — H26.492 POSTERIOR CAPSULE OPACIFICATION, LEFT: ICD-10-CM

## 2019-11-15 PROCEDURE — 66821 AFTER CATARACT LASER SURGERY: CPT | Mod: LT,ZF | Performed by: OPHTHALMOLOGY

## 2019-11-15 PROCEDURE — G0463 HOSPITAL OUTPT CLINIC VISIT: HCPCS | Mod: ZF

## 2019-11-15 ASSESSMENT — SLIT LAMP EXAM - LIDS
COMMENTS: NORMAL
COMMENTS: NORMAL

## 2019-11-15 ASSESSMENT — VISUAL ACUITY
OD_PH_SC: 20/20
OS_SC: 20/60
OD_SC: 20/30
METHOD: SNELLEN - LINEAR
OS_PH_SC: 20/20

## 2019-11-15 ASSESSMENT — CONF VISUAL FIELD
OD_NORMAL: 1
OS_NORMAL: 1

## 2019-11-15 ASSESSMENT — TONOMETRY
OD_IOP_MMHG: 14
IOP_METHOD: ICARE
OS_IOP_MMHG: 16

## 2019-11-15 ASSESSMENT — EXTERNAL EXAM - LEFT EYE: OS_EXAM: NORMAL

## 2019-11-15 ASSESSMENT — EXTERNAL EXAM - RIGHT EYE: OD_EXAM: NORMAL

## 2019-11-15 NOTE — PROGRESS NOTES
CC: s/p Cataract extraction with IOL OD 6/5/18    HPI: 53yo CF presenting for cataract evaluation OD. Reports trouble with reading and night driving in OD, worsening. Painless, progressive vision loss OD. History of Pars plana vitrectomy (PPV)/SB OD for Retinal detachment  Repair 10 yrs prior with Dr. Hamilton.    Interval: Patient notices some chronic floaters OS, and some mild blurriness. Finds she needs to use glasses for extended computer work. No irritation, no pain, tearing, photophobia.    POHx:  S/p pars plana vitrectomy/SB OD for retinal detachment  S/p CE/IOL OD toric 6/5/18  S/p CE/IOL OS toric 7/17/18 - mini-monovision    Meds:   PF AT PRN    A/P:  1) s/p Cataract extraction with IOL OD  - doing well, stable PCO  - may need YAG in the future. Given toric lens, would defer until POM#3    2) s/p CE/IOL OS Toric - mini-monovision  - doing well with monovision  - visually significant PCO OS  - recommend YAG capsulotomy OS - R/B/A discussed, informed consent obtained    3) s/p PPV and scleral buckle repair OD, Pavingstone degeneration both eyes  4) Degenerative Myopia OU/Presbyopia  - Reviewed signs/symptoms of RD and RTC stat should symptoms occur    return to clinic 1 month, DFE each eye, possible YAG OD    Attending Physician Attestation:  Complete documentation of historical and exam elements from today's encounter can be found in the full encounter summary report (not reduplicated in this progress note).  I personally obtained the chief complaint(s) and history of present illness.  I confirmed and edited as necessary the review of systems, past medical/surgical history, family history, social history, and examination findings as documented by others; and I examined the patient myself.  I personally reviewed the relevant tests, images, and reports as documented above.  I formulated and edited as necessary the assessment and plan and discussed the findings and management plan with the patient and family. -  Yuriy Vincent MD

## 2020-01-06 ENCOUNTER — OFFICE VISIT (OUTPATIENT)
Dept: OPHTHALMOLOGY | Facility: CLINIC | Age: 55
End: 2020-01-06
Attending: OPHTHALMOLOGY
Payer: COMMERCIAL

## 2020-01-06 DIAGNOSIS — H26.491 POSTERIOR CAPSULE OPACIFICATION, RIGHT: Primary | ICD-10-CM

## 2020-01-06 PROCEDURE — G0463 HOSPITAL OUTPT CLINIC VISIT: HCPCS | Mod: ZF

## 2020-01-06 PROCEDURE — 66821 AFTER CATARACT LASER SURGERY: CPT | Mod: RT,ZF | Performed by: OPHTHALMOLOGY

## 2020-01-06 ASSESSMENT — SLIT LAMP EXAM - LIDS
COMMENTS: NORMAL
COMMENTS: NORMAL

## 2020-01-06 ASSESSMENT — VISUAL ACUITY
OD_CC: 20/20
OS_CC+: -1
OS_PH_CC: 20/20
METHOD: SNELLEN - LINEAR
OD_CC+: -1
OS_CC: 20/50
OS_PH_CC+: -1

## 2020-01-06 ASSESSMENT — EXTERNAL EXAM - LEFT EYE: OS_EXAM: NORMAL

## 2020-01-06 ASSESSMENT — EXTERNAL EXAM - RIGHT EYE: OD_EXAM: NORMAL

## 2020-01-06 ASSESSMENT — CONF VISUAL FIELD
OS_NORMAL: 1
METHOD: COUNTING FINGERS
OD_NORMAL: 1

## 2020-01-06 ASSESSMENT — TONOMETRY
OS_IOP_MMHG: 15
OD_IOP_MMHG: 16
IOP_METHOD: ICARE

## 2020-01-06 NOTE — NURSING NOTE
Chief Complaints and History of Present Illnesses   Patient presents with     Follow Up     Pseudophakia - Both Eyes      Chief Complaint(s) and History of Present Illness(es)     Follow Up     Associated symptoms: floaters (No change.).  Negative for eye pain, dryness, tearing, haloes, glare and flashes    Comments: Pseudophakia - Both Eyes               Comments     Pt states vision is stable since last visit.  Pt has no pain or other concerns.    ALICE Bates January 6, 2020 1:32 PM

## 2020-01-06 NOTE — PROGRESS NOTES
CC: s/p Cataract extraction with IOL OD 6/5/18    HPI: 53yo CF presenting for cataract evaluation OD. Reports trouble with reading and night driving in OD, worsening. Painless, progressive vision loss OD. History of Pars plana vitrectomy (PPV)/SB OD for Retinal detachment  Repair 10 yrs prior with Dr. Hamilton.    Interval: Patient notices some chronic floaters OS, and some mild blurriness. Finds she needs to use glasses for extended computer work. No irritation, no pain, tearing, photophobia.    POHx:  S/p pars plana vitrectomy/SB OD for retinal detachment  S/p CE/IOL OD toric 6/5/18  S/p CE/IOL OS toric 7/17/18 - mini-monovision    Meds:   PF AT PRN    A/P:  1) s/p Cataract extraction with IOL OD  - doing well, stable PCO  - recommend YAG capsulotomy OD today - R/B/A discussed, informed consent obtained.    2) s/p CE/IOL OS Toric - mini-monovision  - doing well with monovision  - s/p yag cap OS one month ago, mild subjective improvement    3) s/p PPV and scleral buckle repair OD, Pavingstone degeneration both eyes  4) Degenerative Myopia OU/Presbyopia  - Reviewed signs/symptoms of RD and RTC stat should symptoms occur    RTC 1 month, sooner as needed    --  Tip Rolon MD  PGY 5, Cornea Fellow  Ophthalmology    Attending Physician Attestation:  Complete documentation of historical and exam elements from today's encounter can be found in the full encounter summary report (not reduplicated in this progress note).  I personally obtained the chief complaint(s) and history of present illness.  I confirmed and edited as necessary the review of systems, past medical/surgical history, family history, social history, and examination findings as documented by others; and I examined the patient myself.  I personally reviewed the relevant tests, images, and reports as documented above.  I formulated and edited as necessary the assessment and plan and discussed the findings and management plan with the patient and family.  I was present for the key portions of the procedure and immediately available for the remainder. - Yuriy Vincent MD

## 2020-01-27 ENCOUNTER — OFFICE VISIT (OUTPATIENT)
Dept: OPHTHALMOLOGY | Facility: CLINIC | Age: 55
End: 2020-01-27
Attending: OPHTHALMOLOGY
Payer: COMMERCIAL

## 2020-01-27 DIAGNOSIS — Z96.1 PSEUDOPHAKIA OF BOTH EYES: Primary | ICD-10-CM

## 2020-01-27 PROCEDURE — G0463 HOSPITAL OUTPT CLINIC VISIT: HCPCS | Mod: ZF

## 2020-01-27 ASSESSMENT — VISUAL ACUITY
METHOD: SNELLEN - LINEAR
OD_SC+: +2
OS_PH_SC: 20/25
OD_SC: 20/25
OS_SC+: +2
OS_PH_SC+: +2
OS_SC: 20/50

## 2020-01-27 ASSESSMENT — CONF VISUAL FIELD
OD_NORMAL: 1
OS_NORMAL: 1
METHOD: COUNTING FINGERS

## 2020-01-27 ASSESSMENT — TONOMETRY
OD_IOP_MMHG: 12
OS_IOP_MMHG: 13
IOP_METHOD: ICARE

## 2020-01-27 ASSESSMENT — REFRACTION_WEARINGRX
OS_CYLINDER: SPHERE
OD_ADD: +1.25
OD_CYLINDER: +1.25
OS_ADD: +1.25
OD_SPHERE: -12.50
SPECS_TYPE: PAL
OS_SPHERE: -11.00
OD_AXIS: 105

## 2020-01-27 ASSESSMENT — SLIT LAMP EXAM - LIDS
COMMENTS: NORMAL
COMMENTS: NORMAL

## 2020-01-27 ASSESSMENT — EXTERNAL EXAM - RIGHT EYE: OD_EXAM: NORMAL

## 2020-01-27 ASSESSMENT — EXTERNAL EXAM - LEFT EYE: OS_EXAM: NORMAL

## 2020-01-27 NOTE — PROGRESS NOTES
CC: s/p Cataract extraction with IOL OD 6/5/18    HPI: 51yo CF presenting for cataract evaluation OD. Reports trouble with reading and night driving in OD, worsening. Painless, progressive vision loss OD. History of Pars plana vitrectomy (PPV)/SB OD for Retinal detachment  Repair 10 yrs prior with Dr. Hamilton.    Interval Hx: Patient doing well, s/p YAG capsulotomy OD, patient happy with vision. No significant change in vision noticed, no new flashes, floaters, diplopia.     POHx:  S/p pars plana vitrectomy/SB OD for retinal detachment  S/p CE/IOL OD toric 6/5/18  S/p CE/IOL OS toric 7/17/18 - mini-monovision  S/p YAG OU     Meds:   PF AT PRN    A/P:  1) s/p Cataract extraction with IOL OD  - doing well   - s/p YAG Cap OD - doing well, DFE wnl OD    2) s/p CE/IOL OS Toric - mini-monovision  - doing well with monovision  - s/p yag cap OS - doing well    3) s/p PPV and scleral buckle repair OD, Pavingstone degeneration both eyes  4) Degenerative Myopia OU/Presbyopia  - Reviewed signs/symptoms of RD and RTC stat should symptoms occur    RTC 2 year, sooner as needed    Attending Physician Attestation:  Complete documentation of historical and exam elements from today's encounter can be found in the full encounter summary report (not reduplicated in this progress note).  I personally obtained the chief complaint(s) and history of present illness.  I confirmed and edited as necessary the review of systems, past medical/surgical history, family history, social history, and examination findings as documented by others; and I examined the patient myself.  I personally reviewed the relevant tests, images, and reports as documented above.  I formulated and edited as necessary the assessment and plan and discussed the findings and management plan with the patient and family. - Yuriy Vincent MD

## 2020-01-27 NOTE — NURSING NOTE
Chief Complaints and History of Present Illnesses   Patient presents with     Post-op Yag Capsulotomy     3 week follow up s/p YAG RE     Chief Complaint(s) and History of Present Illness(es)     Post-op Yag Capsulotomy     Comments: 3 week follow up s/p YAG RE              Comments     Pt states vision has been good in RE since last visit. No eye pain today.  No flashes or floaters. No redness or dryness.    SHER Orozco  January 27, 2020 8:13 AM

## 2020-11-14 ENCOUNTER — HEALTH MAINTENANCE LETTER (OUTPATIENT)
Age: 55
End: 2020-11-14

## 2021-03-22 ENCOUNTER — MEDICAL CORRESPONDENCE (OUTPATIENT)
Dept: HEALTH INFORMATION MANAGEMENT | Facility: CLINIC | Age: 56
End: 2021-03-22

## 2021-03-22 ENCOUNTER — TRANSFERRED RECORDS (OUTPATIENT)
Dept: HEALTH INFORMATION MANAGEMENT | Facility: CLINIC | Age: 56
End: 2021-03-22

## 2021-04-03 ENCOUNTER — HEALTH MAINTENANCE LETTER (OUTPATIENT)
Age: 56
End: 2021-04-03

## 2021-04-09 ENCOUNTER — ANCILLARY PROCEDURE (OUTPATIENT)
Dept: MAMMOGRAPHY | Facility: CLINIC | Age: 56
End: 2021-04-09
Attending: FAMILY MEDICINE
Payer: COMMERCIAL

## 2021-04-09 DIAGNOSIS — Z12.31 VISIT FOR SCREENING MAMMOGRAM: ICD-10-CM

## 2021-04-09 PROCEDURE — 77063 BREAST TOMOSYNTHESIS BI: CPT | Mod: GC | Performed by: RADIOLOGY

## 2021-04-09 PROCEDURE — 77067 SCR MAMMO BI INCL CAD: CPT | Mod: GC | Performed by: RADIOLOGY

## 2021-09-12 ENCOUNTER — HEALTH MAINTENANCE LETTER (OUTPATIENT)
Age: 56
End: 2021-09-12

## 2022-01-02 ENCOUNTER — HEALTH MAINTENANCE LETTER (OUTPATIENT)
Age: 57
End: 2022-01-02

## 2022-04-11 ENCOUNTER — ANCILLARY PROCEDURE (OUTPATIENT)
Dept: MAMMOGRAPHY | Facility: CLINIC | Age: 57
End: 2022-04-11
Payer: COMMERCIAL

## 2022-04-11 DIAGNOSIS — Z12.31 VISIT FOR SCREENING MAMMOGRAM: ICD-10-CM

## 2022-04-11 PROCEDURE — 77067 SCR MAMMO BI INCL CAD: CPT | Mod: GC | Performed by: STUDENT IN AN ORGANIZED HEALTH CARE EDUCATION/TRAINING PROGRAM

## 2022-04-11 PROCEDURE — 77063 BREAST TOMOSYNTHESIS BI: CPT | Mod: GC | Performed by: STUDENT IN AN ORGANIZED HEALTH CARE EDUCATION/TRAINING PROGRAM

## 2022-06-28 NOTE — PROGRESS NOTES
Operative Note    Patient: Yasir Wiggins 65 year old male    MRN: [id number]    Surgeon(s): Parveen Gaona MD  Phone Number: [phone]                       Surgeon(s) and Role:     * Parveen Gaona MD - Primary    Assistant(s): None    Pre-Op Diagnosis: 1. BPH WITH OBSTRUCTION, 2. URINARY RETENTION     Post-Op Diagnosis: Same     Procedure: Procedure(s):  CYSTOSCOPY, GREENLIGHT PHOTO VAPORIZATION OF THE PROSTATE WITH ENUCLEATION, MORCELLATION    Anesthesia Type: General                                   Complications: None    Description: The patient was prepped and draped in usual sterile fashion placed in lithotomy position under anesthesia.  Rigid cystoscopy was performed.  The anterior urethra appears normal.  The sphincter coapted appropriately.  The prostatic urethra was completely obstructing nature with lateral lobe hypertrophy as well as a very large median lobe.  I was able to enter the bladder by going alongside the median lobe along the sulcus.  Upon entering the bladder pan cystoscopy demonstrates cloudy urine.  There is mild to moderate trabeculation throughout.  The ureteral orifices are visualized.  This point the laser scope was inserted.  I created a trough at the 5:00 and 7 o'clock position from the bladder neck to the verumontanum.  The mucosa was cut proximal to the verumontanum.  With the beak of the scope I was able to enter the plane between the capsule and the adenoma.  Using combination of the laser and blunt dissection I was able to carefully lift the median lobe off the capsule.  Ultimately I was able to free the entire median lobe.  The remaining attachments were lasered at the bladder neck.  The large median lobe specimen was pushed into the bladder.  Once this was done patient had a wide open channel.  He still had some lateral lobe obstruction mostly at the apex.  Here greenlight photo vaporization of the prostate was performed.  The channel was further open.  When I was done the  [facility] VOICE [facility]  Shreyas Holder Jr., M.D., F.A.C.S.  Alisa Garsia M.D., M.P.H.  Kimberly Laguna, Ph.D., CCC/SLP  Petrona Hoffman M.M. (voice), M.A., CCC/SLP  Ady Cedeno M.M. (voice), M.AGUSTIN., [facility]/SLP    [facility] VOICE [facility]  VOICE/SPEECH/BREATHING THERAPY PROGRESS REPORT    Patient: Anna Carrington  Date of Service: 9/15/2017    PROGRESS SINCE LAST SESSION  Ms. Carrington was seen for evaluation on 7/7/17.  At that time, it was determined that:  IMPRESSIONS: R49.0 (Dysphonia) and R68.89 (Chronic Throat Clearing)     Laryngeal evaluation demonstrated a relatively healthy larynx with an imbalance in respiratory mechanics and speech, and supraglottic hyperfunction.    Dysphonia/discomfort is accounted for by the hyperfunction and imbalanced function of the intrinsic and extrinsic laryngeal musculature    STIMULABILITY: results of therapy probes during perceptual and laryngeal evaluation demonstrate improvement with use of yawn sigh    Since then, she states she has not had any change in her voice, which is expected, as no therapeutic suggestions were made at the time.    Ms. Carrington also states that:    Developed an upper respiratory infection shortly after Labor Day.  Increased fluids and airborne and zinc.    Still a little coughing and thickness in the chest, but it has improved.    Has had a series of URI issues.     Ms. Carrington presents today with the following:  VOICE:    Roughness: Mild Intermittent    Breathiness: Mild to moderate Consistent    Strain: WNL     Mild back focus    Loudness    Conversational speech:  Mild to moderately reduced;Tends to speak with a softer voice.    Projected speech:  Mild to moderately reduced    THERAPEUTIC ACTIVITIES  Today Ms. Carrington participated in the following therapeutic activities:    [place] concepts and technqiues for using saline and plain-water gargling, nasal irrigation, and steam to reduce the thickened secretions / laryngeal irritation.    [place]  concepts and techniques for improving topical and systemic hydration     [place] exercises for optimal respiratory mechanics for speech and singing.  o I provided explanation of the anatomy and physiology of respiration for speech and singing; she found this to be helpful  o she demonstrated clavicular/neck/shoulder involvement in inhalation  o demonstrated difficulty allowing abdominal relaxation for inhalation  o with guidance, learned improved abdominal relaxation for inhalation  o learned techniques for optimal airflow on exhalation  o practiced in prone and supine positions on the massage table; this was helpful  o Concho a respiratory pacing exercise; this was helpful.  o good learning, but will need practice    [place]cho exercises to add phonation to the optimal flowing airstream.  o Semi-occluded vocal tract exercises with /m/, tongue trill or straw phonation with water resistance were most facilitating; the benefits of each activity were discussed.  o Instructed to use as a voice warm up, cool down, coordination of breath flow with phonation, and for tissue mobilization.  o good learning, but will need practice     Concho exercises for improved airflow during phonation.  o speech material with /ju/ glides, aspirate+vowels, and easy onset phrases was facilitating  o Instructed at the word and phrase level.  o Instructed at the word level and with an arpeggio pattern.  o learned techniques to reduce glottal polanco and improve breath flow    [place] concepts of an optimal regimen for practice.  o she should use an interval schedule of practice, with brief periods of practice frequently throughout each day  o [place] concepts of volitional practice to facilitate motor learning.    I provided an after visit summary to help facilitate practice.    An audio recording of today's therapeutic activities was provided, to facilitate practice.    IMPRESSIONS/GOALS/PLAN  Ms. Carrington had a productive session of therapy  patient had a large channel with good hemostasis.  The verumontanum and sphincter were intact.  The ureteral orifice ease were intact.  The scope was then removed and the morcellator scope was inserted.  At this point morcellation of the prostate was performed.  Ultimately all the specimen was morcellated.  There was good hemostasis and the bladder itself appeared normal.  Next the scope was removed.  A 24 Ukrainian three-way catheter was placed using a Mandarin guide.  About 40 cc were placed in the balloon.  On light traction this irrigated clear.  The patient tolerated the procedure without any immediate complications.    Postoperative plan:  1.  Patient may be discharged home today  2.  Patient should return to clinic tomorrow for Ro catheter removal in the office    Findings: BPH with very large obstructing median lobe    Specimens Removed: Prostate Chips     Estimated Blood Loss: minimal     Assistant Tasks: None     Implants: * No implants in log *    I was present for the key portions of the procedure and was immediately available for the non-key portions             today, working on techniques/strategies/exercises that will help her achieve her goal of acceptable and comfortable voice use, secondary to dysphonia and chronic throat clearing; allowing her to meet personal and professional vocal demands and fully engage in activities of daily living.     Progress toward long-term goals:   Minimal at this point, as this is first session, but good learning today    Goals for this practice period:     practice all exercises according to instructions    incorporate techniques into daily vocal activities    maintain vigilance for vocal technique    Plan: I will see Ms. Carrington in 2 weeks to work on education, modification, and carryover of therapeutic activities to more complex phonatory tasks.    IMPRESSIONS: R49.0 (Dysphonia) and R68.89 (Chronic Throat Clearing)     TOTAL SERVICE TIME: 60 minutes  TREATMENT (59793): 60 minutes  NO CHARGE FACILITY FEE (97055)    Petrona Hoffman M.M. (voice), M.A., CCC/SLP  Speech-Language Pathologist  [facility]  [phone]

## 2022-07-16 ENCOUNTER — OFFICE VISIT (OUTPATIENT)
Dept: URGENT CARE | Facility: URGENT CARE | Age: 57
End: 2022-07-16
Payer: COMMERCIAL

## 2022-07-16 ENCOUNTER — NURSE TRIAGE (OUTPATIENT)
Dept: NURSING | Facility: CLINIC | Age: 57
End: 2022-07-16

## 2022-07-16 VITALS
HEART RATE: 96 BPM | HEIGHT: 68 IN | DIASTOLIC BLOOD PRESSURE: 70 MMHG | RESPIRATION RATE: 15 BRPM | OXYGEN SATURATION: 98 % | TEMPERATURE: 97.2 F | SYSTOLIC BLOOD PRESSURE: 112 MMHG | BODY MASS INDEX: 25.01 KG/M2 | WEIGHT: 165 LBS

## 2022-07-16 DIAGNOSIS — N30.00 ACUTE CYSTITIS WITHOUT HEMATURIA: Primary | ICD-10-CM

## 2022-07-16 DIAGNOSIS — R30.0 DYSURIA: ICD-10-CM

## 2022-07-16 LAB
ALBUMIN UR-MCNC: NEGATIVE MG/DL
APPEARANCE UR: ABNORMAL
BACTERIA #/AREA URNS HPF: ABNORMAL /HPF
BILIRUB UR QL STRIP: NEGATIVE
CLUE CELLS: ABNORMAL
COLOR UR AUTO: YELLOW
GLUCOSE UR STRIP-MCNC: NEGATIVE MG/DL
HGB UR QL STRIP: ABNORMAL
KETONES UR STRIP-MCNC: ABNORMAL MG/DL
LEUKOCYTE ESTERASE UR QL STRIP: ABNORMAL
NITRATE UR QL: NEGATIVE
PH UR STRIP: 6 [PH] (ref 5–7)
RBC #/AREA URNS AUTO: ABNORMAL /HPF
SP GR UR STRIP: 1.02 (ref 1–1.03)
TRICHOMONAS, WET PREP: ABNORMAL
UROBILINOGEN UR STRIP-ACNC: 0.2 E.U./DL
WBC #/AREA URNS AUTO: ABNORMAL /HPF
WBC'S/HIGH POWER FIELD, WET PREP: ABNORMAL
YEAST, WET PREP: ABNORMAL

## 2022-07-16 PROCEDURE — 81001 URINALYSIS AUTO W/SCOPE: CPT | Performed by: PHYSICIAN ASSISTANT

## 2022-07-16 PROCEDURE — 87210 SMEAR WET MOUNT SALINE/INK: CPT | Performed by: PHYSICIAN ASSISTANT

## 2022-07-16 PROCEDURE — 99203 OFFICE O/P NEW LOW 30 MIN: CPT | Performed by: PHYSICIAN ASSISTANT

## 2022-07-16 PROCEDURE — 87086 URINE CULTURE/COLONY COUNT: CPT | Performed by: PHYSICIAN ASSISTANT

## 2022-07-16 RX ORDER — CEPHALEXIN 500 MG/1
500 CAPSULE ORAL 4 TIMES DAILY
Qty: 28 CAPSULE | Refills: 0 | Status: SHIPPED | OUTPATIENT
Start: 2022-07-16 | End: 2022-07-23

## 2022-07-16 NOTE — TELEPHONE ENCOUNTER
"Nurse Triage SBAR    Is this a 2nd Level Triage? NO    Situation: Patient calling with Symptoms of her UTI starting to return.  Consent: not needed    Background:  Pt was treated for a UTI and it went away a little, but as of today it's back again.  The urgency to urinate today is pretty strong.    5 day course and pt finished on Tuesday.   Pt states \"mainly just urgency, a little discomfort, but hasn't progressed to pain\".      Assessment:   Denies fever - just feels like her UTI is coming back a little.     Protocol Recommended Disposition:   Be seen within 24 hours.     Recommendation: Advised patient to Go to urgent care . Reviewed concerning symptoms and when to call back.     Alisha Grewal RN [facility] 7/16/2022 3:11 PM    COVID 19 Nurse Triage Plan/Patient Instructions    Please be aware that novel coronavirus (COVID-19) may be circulating in the community. If you develop symptoms such as fever, cough, or SOB or if you have concerns about the presence of another infection including coronavirus (COVID-19), please contact your health care provider or visit [email or web address].     Disposition/Instructions    In-Person Visit with provider recommended. Reference Visit Selection Guide.    Thank you for taking steps to prevent the spread of this virus.  o Limit your contact with others.  o Wear a simple mask to cover your cough.  o Wash your hands well and often.    Resources    [facility]: About COVID-19: [email or web address]    CDC: What to Do If You're Sick: www.cdc.gov/coronavirus/2019-ncov/about/steps-when-sick.html    CDC: Ending Home Isolation: www.cdc.gov/coronavirus/2019-ncov/hcp/disposition-in-home-patients.html     CDC: Caring for Someone: www.cdc.gov/coronavirus/2019-ncov/if-you-are-sick/care-for-someone.html     [facility]: Interim Guidance for Hospital Discharge to Home: www.health.[facility].[place].us/diseases/coronavirus/hcp/hospdischarge.pdf    [facility]" [place] clinical trials (COVID-19 research studies): clinicalaffairs.[facility]-clinical-trials     Below are the COVID-19 hotlines at the [place] Department of Health ([facility]). Interpreters are available.   o For health questions: Call 461-712-3341 or 1-223.151.1787 (7 a.m. to 7 p.m.)  o For questions about schools and childcare: Call 299-275-0697 or 1-838.129.3652 (7 a.m. to 7 p.m.)                         Reason for Disposition    Urinating more frequently than usual (i.e., frequency)    Additional Information    Negative: Shock suspected (e.g., cold/pale/clammy skin, too weak to stand, low BP, rapid pulse)    Negative: Sounds like a life-threatening emergency to the triager    Negative: Followed a genital area injury    Negative: Followed a genital area injury (penis, scrotum)    Negative: Vaginal discharge    Negative: Pus (white, yellow) or bloody discharge from end of penis    Negative: [1] Taking antibiotic for urinary tract infection (UTI) AND [2] female    Negative: [1] Taking antibiotic for urinary tract infection (UTI) AND [2] male    Negative: [1] Discomfort (pain, burning or stinging) when passing urine AND [2] pregnant    Negative: [1] Discomfort (pain, burning or stinging) when passing urine AND [2] postpartum (from 0 to 6 weeks after delivery)    Negative: [1] Discomfort (pain, burning or stinging) when passing urine AND [2] female    Negative: [1] Discomfort (pain, burning or stinging) when passing urine AND [2] male    Negative: Pain or itching in the vulvar area    Negative: Pain in scrotum is main symptom    Negative: Blood in the urine is main symptom    Negative: Symptoms arising from use of a urinary catheter (Carlson or Coude)    Negative: [1] Unable to urinate (or only a few drops) > 4 hours AND     [2] bladder feels very full (e.g., palpable bladder or strong urge to urinate)    Negative: [1] Decreased urination and [2] drinking very little AND [2] dehydration suspected (e.g., dark urine,  no urine > 12 hours, very dry mouth, very lightheaded)    Negative: Patient sounds very sick or weak to the triager    Negative: Fever > 100.4 F (38.0 C)    Negative: Side (flank) or lower back pain present    Negative: [1] Can't control passage of urine (i.e., urinary incontinence) AND [2] new onset (< 2 weeks) or worsening    Protocols used: URINARY SYMPTOMS-A-AH

## 2022-07-16 NOTE — PROGRESS NOTES
Dysuria  - Wet prep - Clinic Collect  - UA macro with reflex to Microscopic and Culture - Clinc Collect  - Urine Microscopic Exam  - Urine Culture  - cephALEXin (KEFLEX) 500 MG capsule; Take 1 capsule (500 mg) by mouth 4 times daily for 7 days    Acute cystitis without hematuria  - cephALEXin (KEFLEX) 500 MG capsule; Take 1 capsule (500 mg) by mouth 4 times daily for 7 days    Age 12 months or more  Okay to use Zarbee's   Okay to use Rx Children Tylenol if prescribed (Dose based on weight)    Age 2-12:   Okay to use Children Motrin or Tylenol over the counter.    Adults:  Okay to take acetaminophen 500 mg- 2 tabs (Total of 1000 mg) every 8 hrs   Okay to take ibuprofen 200 mg- 3 tabs (Total of 600 mg) every 6 hours        Okay to use Neti pot for sinus lavage up to three times daily for congestion and sinus pressure if present. Daily hot shower can be beneficial for congestion and body aches. Okay to use bedroom vaporizer or humidifier if symptoms are worse at night. Nightly Vicks Vapor rub and 5-10 mg of Melatonin okay to use for sleep.     Over the counter cough medication and decongestants okay if not prescribed by me during this visit. For homeopathic alternatives to cough syrup and decongestant, feel free to try Elderberry extract.    Okay to use salt water gargles, warm tea (or warm water with lemon and honey), and lozenges for any throat discomfort. Chloraseptic spray is also highly encourages for throat pain/irritation.     Patient will need to get plenty of rest and drink at least 1.5-2 liters of fluids daily for adults and 1-1.5 liters for children. If vomiting and not tolerating liquids for more than 24 hrs, please go to your nearest emergency department for IV fluids and further treatment.     Patient is not contagious after 1 week from start of symptoms. If possible, wear mask for first 7 days. Wash hands regularly and vigorously for 30 seconds often.         Sancho Gillette PA-C  [facility]  URGENT CARE    Subjective   56 year old who presents to clinic today for the following health issues:    Urgent Care and UTI       HPI     Genitourinary - Female  Onset/Duration: Today- Patient was treated for a UTI with and unknown antibiotic 1 week ago and her symptoms completely resolved.   Description:   Painful urination (Dysuria): YES           Frequency: YES  Blood in urine (Hematuria): No  Delay in urine (Hesitency): No  Intensity: moderate  Progression of Symptoms:  same  Accompanying Signs & Symptoms:  Fever/chills: No  Flank pain: No  Nausea and vomiting: No  Vaginal symptoms: none  Abdominal/Pelvic Pain: No  History:   History of frequent UTI s: No  History of kidney stones: No  Precipitating or alleviating factors: Possibly dehydration   Therapies tried and outcome:  None        Review of Systems   Review of Systems   See HPI     Objective    Temp: 97.2  F (36.2  C) Temp src: Temporal BP: 112/70 Pulse: 96   Resp: 15 SpO2: 98 %       Physical Exam   Physical Exam  Constitutional:       General: She is not in acute distress.     Appearance: Normal appearance. She is normal weight. She is not ill-appearing, toxic-appearing or diaphoretic.   HENT:      Head: Normocephalic and atraumatic.   Cardiovascular:      Rate and Rhythm: Normal rate.      Pulses: Normal pulses.   Pulmonary:      Effort: Pulmonary effort is normal. No respiratory distress.   Abdominal:      Tenderness: There is no right CVA tenderness or left CVA tenderness.   Neurological:      General: No focal deficit present.      Mental Status: She is alert and oriented to person, place, and time. Mental status is at baseline.      Gait: Gait normal.   Psychiatric:         Mood and Affect: Mood normal.         Behavior: Behavior normal.         Thought Content: Thought content normal.         Judgment: Judgment normal.          Results for orders placed or performed in visit on 07/16/22 (from the past 24 hour(s))   Wet prep - Clinic Collect     Specimen: Vagina; Swab   Result Value Ref Range    Trichomonas Absent Absent    Yeast Absent Absent    Clue Cells Absent Absent    WBCs/high power field 2+ (A) None   UA macro with reflex to Microscopic and Culture - Clinc Collect    Specimen: Urine, Midstream   Result Value Ref Range    Color Urine Yellow Colorless, Straw, Light Yellow, Yellow    Appearance Urine Slightly Cloudy (A) Clear    Glucose Urine Negative Negative mg/dL    Bilirubin Urine Negative Negative    Ketones Urine Trace (A) Negative mg/dL    Specific Gravity Urine 1.020 1.003 - 1.035    Blood Urine Large (A) Negative    pH Urine 6.0 5.0 - 7.0    Protein Albumin Urine Negative Negative mg/dL    Urobilinogen Urine 0.2 0.2, 1.0 E.U./dL    Nitrite Urine Negative Negative    Leukocyte Esterase Urine Large (A) Negative   Urine Microscopic Exam   Result Value Ref Range    Bacteria Urine Few (A) None Seen /HPF    RBC Urine 2-5 (A) 0-2 /HPF /HPF    WBC Urine 25-50 (A) 0-5 /HPF /HPF    Narrative    Microscopic exam performed on unspun urine.

## 2022-07-19 LAB — BACTERIA UR CULT: NORMAL

## 2022-11-19 ENCOUNTER — HEALTH MAINTENANCE LETTER (OUTPATIENT)
Age: 57
End: 2022-11-19

## 2023-04-09 ENCOUNTER — HEALTH MAINTENANCE LETTER (OUTPATIENT)
Age: 58
End: 2023-04-09

## 2023-05-10 ENCOUNTER — ANCILLARY PROCEDURE (OUTPATIENT)
Dept: MAMMOGRAPHY | Facility: CLINIC | Age: 58
End: 2023-05-10
Payer: COMMERCIAL

## 2023-05-10 DIAGNOSIS — Z12.31 VISIT FOR SCREENING MAMMOGRAM: ICD-10-CM

## 2023-05-10 PROCEDURE — 77067 SCR MAMMO BI INCL CAD: CPT | Performed by: RADIOLOGY

## 2023-05-10 PROCEDURE — 77063 BREAST TOMOSYNTHESIS BI: CPT | Performed by: RADIOLOGY

## 2023-11-08 NOTE — PROGRESS NOTES
CC: s/p Cataract extraction with IOL OD 6/5/18    Interval: Doing well, no irritation, no pain, tearing, photophobia.    HPI: 51yo CF presenting for cataract evaluation OD. Reports trouble with reading and night driving in OD, worsening. Painless, progressive vision loss OD. History of Pars plana vitrectomy (PPV)/SB OD for Retinal detachment  Repair 10 yrs prior with Dr. Hamilton.    POHx:  S/p pars plana vitrectomy/SB OD for retinal detachment  S/p CE/IOL OD toric 6/5/18  S/p CE/IOL OS toric 7/17/18 - mini-monovision    Meds:   PF AT PRN    A/P:  1) s/p Cataract extraction with IOL OD  - doing well, stable PCO  - may need YAG in the future. Given toric lens, would defer until POM#3    2) s/p CE/IOL OS Toric - mini-monovision  - doing well with monovision    3) s/p PPV and scleral buckle repair OD, Pavingstone degeneration both eyes  4) Degenerative Myopia OU/Presbyopia  -Reviewed signs/symptoms of RD and RTC stat should symptoms occur    return to clinic Postoperative month 3, DFE each eye to consider YAG OD    Attending Physician Attestation:  Complete documentation of historical and exam elements from today's encounter can be found in the full encounter summary report (not reduplicated in this progress note).  I personally obtained the chief complaint(s) and history of present illness.  I confirmed and edited as necessary the review of systems, past medical/surgical history, family history, social history, and examination findings as documented by others; and I examined the patient myself.  I personally reviewed the relevant tests, images, and reports as documented above.  I formulated and edited as necessary the assessment and plan and discussed the findings and management plan with the patient and family. - Yuriy Vincent MD    
97

## 2024-05-14 ENCOUNTER — TRANSCRIBE ORDERS (OUTPATIENT)
Dept: OTHER | Age: 59
End: 2024-05-14

## 2024-05-14 ENCOUNTER — ANCILLARY PROCEDURE (OUTPATIENT)
Dept: MAMMOGRAPHY | Facility: CLINIC | Age: 59
End: 2024-05-14
Payer: COMMERCIAL

## 2024-05-14 ENCOUNTER — MEDICAL CORRESPONDENCE (OUTPATIENT)
Dept: HEALTH INFORMATION MANAGEMENT | Facility: CLINIC | Age: 59
End: 2024-05-14

## 2024-05-14 DIAGNOSIS — Z80.0 FAMILY HISTORY OF COLON CANCER REQUIRING SCREENING COLONOSCOPY: Primary | ICD-10-CM

## 2024-05-14 DIAGNOSIS — Z12.31 VISIT FOR SCREENING MAMMOGRAM: ICD-10-CM

## 2024-05-14 PROCEDURE — 77067 SCR MAMMO BI INCL CAD: CPT | Mod: GC | Performed by: STUDENT IN AN ORGANIZED HEALTH CARE EDUCATION/TRAINING PROGRAM

## 2024-05-14 PROCEDURE — 77063 BREAST TOMOSYNTHESIS BI: CPT | Mod: GC | Performed by: STUDENT IN AN ORGANIZED HEALTH CARE EDUCATION/TRAINING PROGRAM

## 2024-05-23 ENCOUNTER — TELEPHONE (OUTPATIENT)
Dept: GASTROENTEROLOGY | Facility: CLINIC | Age: 59
End: 2024-05-23
Payer: COMMERCIAL

## 2024-05-23 NOTE — TELEPHONE ENCOUNTER
"Endoscopy Scheduling Screen    Have you had a positive Covid test in the last 14 days?  No    What is your communication preference for Instructions and/or Bowel Prep?   MyChart    What insurance is in the chart?  Other:  [employer]    Ordering/Referring Provider:   IVAN CHAPMAN    (If ordering provider performs procedure, schedule with ordering provider unless otherwise instructed. )    BMI: Estimated body mass index is 25.09 kg/m  as calculated from the following:    Height as of 7/16/22: 1.727 m (5' 8\").    Weight as of 7/16/22: 74.8 kg (165 lb).     Sedation Ordered  MAC/deep sedation.   BMI<= 45 45 < BMI <= 48 48 < BMI < = 50  BMI > 50   No Restrictions No [facility]  No [facility]  [facility] with exceptions Hospital Only OR Only       Do you have a history of malignant hyperthermia?  No    (Females) Are you currently pregnant?   No     Have you been diagnosed or told you have pulmonary hypertension?   No    Do you have an LVAD?  No    Have you been told you have moderate to severe sleep apnea?  No    Have you been told you have COPD, asthma, or any other lung disease?  No    Do you have any heart conditions?  No     Have you ever had or are you waiting for an organ transplant?  No. Continue scheduling, no site restrictions.    Have you had a stroke or transient ischemic attack (TIA aka \"mini stroke\" in the last 6 months?   No    Have you been diagnosed with or been told you have cirrhosis of the liver?   No    Are you currently on dialysis?   No    Do you need assistance transferring?   No    BMI: Estimated body mass index is 25.09 kg/m  as calculated from the following:    Height as of 7/16/22: 1.727 m (5' 8\").    Weight as of 7/16/22: 74.8 kg (165 lb).     Is patients BMI > 40 and scheduling location [facility]?  No    Do you take an injectable medication for weight loss or diabetes (excluding insulin)?  No    Do you take the medication Naltrexone?  No    Do you take blood thinners?  No       Prep   Are you currently on " dialysis or do you have chronic kidney disease?  No    Do you have a diagnosis of diabetes?  No    Do you have a diagnosis of cystic fibrosis (CF)?  No    On a regular basis do you go 3 -5 days between bowel movements?  No    BMI > 40?  No    Preferred Pharmacy:        [facility] - [place], [place] - [address]  [address]  [place] [address]  Phone: [phone] Fax: [phone]          Final Scheduling Details     Procedure scheduled  Colonoscopy    Surgeon:  Bryn     Date of procedure:  9/27     Pre-OP / PAC:   No - Not required for this site.    Location  [facility] - ASC - Per order.    Sedation   MAC/Deep Sedation - Per order.      Patient Reminders:   You will receive a call from a Nurse to review instructions and health history.  This assessment must be completed prior to your procedure.  Failure to complete the Nurse assessment may result in the procedure being cancelled.      On the day of your procedure, please designate an adult(s) who can drive you home stay with you for the next 24 hours. The medicines used in the exam will make you sleepy. You will not be able to drive.      You cannot take public transportation, ride share services, or non-medical taxi service without a responsible caregiver.  Medical transport services are allowed with the requirement that a responsible caregiver will receive you at your destination.  We require that drivers and caregivers are confirmed prior to your procedure.

## 2024-06-15 ENCOUNTER — HEALTH MAINTENANCE LETTER (OUTPATIENT)
Age: 59
End: 2024-06-15

## 2024-07-30 ENCOUNTER — OFFICE VISIT (OUTPATIENT)
Dept: OPHTHALMOLOGY | Facility: CLINIC | Age: 59
End: 2024-07-30
Attending: OPHTHALMOLOGY
Payer: COMMERCIAL

## 2024-07-30 DIAGNOSIS — H52.13 SEVERE MYOPIA OF BOTH EYES: ICD-10-CM

## 2024-07-30 DIAGNOSIS — Z96.1 PSEUDOPHAKIA, BOTH EYES: Primary | ICD-10-CM

## 2024-07-30 DIAGNOSIS — H33.8 OLD RETINAL DETACHMENT, PARTIAL: ICD-10-CM

## 2024-07-30 PROCEDURE — 92015 DETERMINE REFRACTIVE STATE: CPT

## 2024-07-30 PROCEDURE — 99212 OFFICE O/P EST SF 10 MIN: CPT | Performed by: OPHTHALMOLOGY

## 2024-07-30 PROCEDURE — 92004 COMPRE OPH EXAM NEW PT 1/>: CPT | Performed by: OPHTHALMOLOGY

## 2024-07-30 ASSESSMENT — VISUAL ACUITY
OS_SC: 20/50
OD_SC: 20/20
OS_SC: 20/25
OD_SC+: -2
OS_SC+: -2
METHOD: SNELLEN - LINEAR

## 2024-07-30 ASSESSMENT — EXTERNAL EXAM - LEFT EYE: OS_EXAM: NORMAL

## 2024-07-30 ASSESSMENT — CONF VISUAL FIELD
OD_INFERIOR_NASAL_RESTRICTION: 0
OD_SUPERIOR_NASAL_RESTRICTION: 0
OD_INFERIOR_TEMPORAL_RESTRICTION: 0
OS_NORMAL: 1
OD_SUPERIOR_TEMPORAL_RESTRICTION: 0
OD_NORMAL: 1
METHOD: COUNTING FINGERS
OS_SUPERIOR_NASAL_RESTRICTION: 0
OS_INFERIOR_NASAL_RESTRICTION: 0
OS_INFERIOR_TEMPORAL_RESTRICTION: 0
OS_SUPERIOR_TEMPORAL_RESTRICTION: 0

## 2024-07-30 ASSESSMENT — REFRACTION_MANIFEST
OS_SPHERE: -2.00
OD_SPHERE: -1.00
OS_ADD: +2.50
OS_CYLINDER: +0.75
OS_AXIS: 085
OD_CYLINDER: +0.50
OD_AXIS: 090
OD_ADD: +2.50

## 2024-07-30 ASSESSMENT — EXTERNAL EXAM - RIGHT EYE: OD_EXAM: NORMAL

## 2024-07-30 ASSESSMENT — TONOMETRY
IOP_METHOD: TONOPEN
OD_IOP_MMHG: 17
OS_IOP_MMHG: 13

## 2024-07-30 ASSESSMENT — SLIT LAMP EXAM - LIDS
COMMENTS: NORMAL
COMMENTS: NORMAL

## 2024-07-30 ASSESSMENT — CUP TO DISC RATIO
OD_RATIO: 0.2
OS_RATIO: 0.2

## 2024-07-30 NOTE — NURSING NOTE
Chief Complaints and History of Present Illnesses   Patient presents with    COMPREHENSIVE EYE EXAM     Chief Complaint(s) and History of Present Illness(es)       COMPREHENSIVE EYE EXAM               Comments    Patient states that her vision is good. No pain and irritation. No flashes of light. She states that she does have floaters in the LE, but no changes.     Ocular Meds:artifical tears as needed    Danis MENDEZ, July 30, 2024 9:17 AM

## 2024-07-30 NOTE — PROGRESS NOTES
HPI    Patient states that her vision is good. No pain and irritation. No flashes of light. She states that she does have floaters in the LE, but no changes.     Ocular Meds:artifical tears as needed    Danis MENDEZ, July 30, 2024 9:17 AM        Last edited by Danis Cavazos on 7/30/2024  9:20 AM.          Review of systems for the eyes was negative other than the pertinent positives/negatives listed in the HPI.      Assessment & Plan      Anna Carrington is a 59 year old female with the following diagnoses:   1. Pseudophakia, both eyes    2. Severe myopia of both eyes    3. Old retinal detachment, partial - Right Eye         New patient, last seen in 2020 c Dr. Vincent  S/P cataract extraction both eyes 2018 ([facility])  s/p PPV and scleral buckle repair Right eye ([facility])  H/O high myopia both eyes     Stable dilated fundus exam   Discussed symptoms of retinal tear/detachment and the need to be seen urgently should they occur   Refractive options reviewed  Refraction given      Patient disposition:   Return in about 1 year (around 7/30/2025) for DFE.           Attending Physician Attestation:  Complete documentation of historical and exam elements from today's encounter can be found in the full encounter summary report (not reduplicated in this progress note).  I personally obtained the chief complaint(s) and history of present illness.  I confirmed and edited as necessary the review of systems, past medical/surgical history, family history, social history, and examination findings as documented by others; and I examined the patient myself.  I personally reviewed the relevant tests, images, and reports as documented above.  I formulated and edited as necessary the assessment and plan and discussed the findings and management plan with the patient and family. . - Yuriy Champion MD

## 2024-08-20 ENCOUNTER — TELEPHONE (OUTPATIENT)
Dept: GASTROENTEROLOGY | Facility: CLINIC | Age: 59
End: 2024-08-20
Payer: COMMERCIAL

## 2024-08-20 NOTE — TELEPHONE ENCOUNTER
Caller: Anna Carrington      Reason for Reschedule/Cancellation   (please be detailed, any staff messages or encounters to note?): Patient returned call to reschedule      Rescheduled: Yes,   Procedure: Lower Endoscopy [Colonoscopy]    Date: 12/5   Location: [facility]; [address] [place] [address], 5th Floor, [place] [address]   Surgeon: TUYET   Sedation Level Scheduled  MAC ,  Reason for Sedation Level PER ORDER   Instructions updated and sent: MYCHART     Does patient need PAC or Pre -Op Rescheduled? : NO       Did you cancel or rescheduled an EUS procedure? No.

## 2024-08-20 NOTE — TELEPHONE ENCOUNTER
Reason for Reschedule/Cancellation   (please be detailed, any staff messages or encounters to note?): GOFFREDO OOO      Prior to reschedule please review:  Ordering Provider: IVAN CHAPMAN   Sedation Determined: MAC  Does patient have any ASC Exclusions, please identify?: N      Notes on Cancelled Procedure:  Procedure: Lower Endoscopy [Colonoscopy]   Date: 09/27/2024  Location: [facility]; [address] [place] [address], 5th Floor, [place] [address]  Surgeon: PARAMJIT      Rescheduled: No, LVM FOR PT TO CALL BACK, MYCHART SENT. CASE IN DEPOT.       Did you cancel or rescheduled an EUS procedure? No.

## 2024-08-21 ENCOUNTER — TELEPHONE (OUTPATIENT)
Dept: GASTROENTEROLOGY | Facility: CLINIC | Age: 59
End: 2024-08-21
Payer: COMMERCIAL

## 2024-08-21 NOTE — TELEPHONE ENCOUNTER
Caller:     Reason for Reschedule/Cancellation   (please be detailed, any staff messages or encounters to note?): CALLED TO SCHEDULE EARLIER - SPOT HELD      Prior to reschedule please review:  Ordering Provider:     IVAN CHAPMAN     Sedation Determined: MAC  Does patient have any ASC Exclusions, please identify?:       Notes on Cancelled Procedure:  Procedure: Lower Endoscopy [Colonoscopy]   Date: 12/5  Location: [facility]; [address] [place] [address], [place] [address]  Surgeon: TUYET      Rescheduled: Yes,   Procedure: Lower Endoscopy [Colonoscopy]    Date: 10/4   Location: [facility]; [address] [place] [address], [place] [address]   Surgeon: VENESSA   Sedation Level Scheduled  MAC ,  Reason for Sedation Level ORDER   Instructions updated and sent: Y     Does patient need PAC or Pre -Op Rescheduled? : N       Did you cancel or rescheduled an EUS procedure? No.

## 2024-08-21 NOTE — TELEPHONE ENCOUNTER
Writer reached out to patient to offer earlier availability, lvm for patient to call back and reschedule if interrested. [employer] message sent.    Hold placed on 10/4 at [facility] with Therese, please offer this date.

## 2024-09-24 ENCOUNTER — TELEPHONE (OUTPATIENT)
Dept: GASTROENTEROLOGY | Facility: CLINIC | Age: 59
End: 2024-09-24
Payer: COMMERCIAL

## 2024-09-24 NOTE — TELEPHONE ENCOUNTER
Pre visit planning completed.      Procedure details:    Patient scheduled for Colonoscopy on 10/4/24.     Arrival time: 0845. Procedure time 0945    Facility location: [facility]; [address] [place] [address], 5th Floor, [place] [phone]. Check in location: 5th Floor.    Sedation type: MAC    Pre op exam needed? No.    Indication for procedure: Screening      Chart review:     Electronic implanted devices? No    Recent diagnosis of diverticulitis within the last 6 weeks? No      Medication review:    Diabetic? No    Anticoagulants? No    Weight loss medication/injectable? No GLP-1 medication per patient's medication list.  RN will verify with pre-assessment call.    Other medication HOLDING recommendations:  N/A      Prep for procedure:     Bowel prep recommendation: Standard Miralax  Due to: standard bowel prep.    Prep instructions sent via [employer] (T/A)         Anna Duncan RN  Endoscopy Procedure Pre Assessment RN  [phone] option 2

## 2024-09-25 NOTE — TELEPHONE ENCOUNTER
Attempted to contact patient in order to complete pre assessment questions.     No answer. Left message to return call to [phone] option 2    Callback required communication sent via [employer].      Anna Lord RN  Endoscopy Procedure Pre Assessment

## 2024-09-25 NOTE — TELEPHONE ENCOUNTER
Pre assessment completed for upcoming procedure.   (Please see previous telephone encounter notes for complete details)    Patient  returned call.       Procedure details:    Arrival time and facility location reviewed.    Pre op exam needed? No.    Designated  policy reviewed. Instructed to have someone stay 24  hours post procedure.       Medication review:    Medications reviewed. Please see supporting documentation below. Holding recommendations discussed (if applicable).   Ferrous Sulfate (iron supplement): HOLD 7 days before procedure.      Prep for procedure:     Procedure prep instructions reviewed.        Any additional information needed:  N/A      Patient  verbalized understanding and had no questions or concerns at this time.      Elvira Valentine RN  Endoscopy Procedure Pre Assessment   [phone] option 2

## 2024-10-03 ENCOUNTER — ANESTHESIA EVENT (OUTPATIENT)
Dept: SURGERY | Facility: AMBULATORY SURGERY CENTER | Age: 59
End: 2024-10-03
Payer: COMMERCIAL

## 2024-10-03 RX ORDER — OXYCODONE HYDROCHLORIDE 5 MG/1
10 TABLET ORAL
Status: CANCELLED | OUTPATIENT
Start: 2024-10-03

## 2024-10-03 RX ORDER — ONDANSETRON 2 MG/ML
4 INJECTION INTRAMUSCULAR; INTRAVENOUS EVERY 30 MIN PRN
Status: CANCELLED | OUTPATIENT
Start: 2024-10-03

## 2024-10-03 RX ORDER — NALOXONE HYDROCHLORIDE 0.4 MG/ML
0.1 INJECTION, SOLUTION INTRAMUSCULAR; INTRAVENOUS; SUBCUTANEOUS
Status: CANCELLED | OUTPATIENT
Start: 2024-10-03

## 2024-10-03 RX ORDER — DEXAMETHASONE SODIUM PHOSPHATE 10 MG/ML
4 INJECTION, SOLUTION INTRAMUSCULAR; INTRAVENOUS
Status: CANCELLED | OUTPATIENT
Start: 2024-10-03

## 2024-10-03 RX ORDER — OXYCODONE HYDROCHLORIDE 5 MG/1
5 TABLET ORAL
Status: CANCELLED | OUTPATIENT
Start: 2024-10-03

## 2024-10-03 RX ORDER — ONDANSETRON 4 MG/1
4 TABLET, ORALLY DISINTEGRATING ORAL EVERY 30 MIN PRN
Status: CANCELLED | OUTPATIENT
Start: 2024-10-03

## 2024-10-03 NOTE — H&P
Anna Carrington  [id number]  female  59 year old    Reason for procedure/surgery: 59F with minimal PMH and father with CRC (age > 60) who presents for screening colonoscopy. Normal colonoscopy in 5/2017.    Patient Active Problem List   Diagnosis    Abnormal brain MRI    Asymmetrical hearing loss    Dysphonia    Chronic throat clearing       Past Surgical History:    Past Surgical History:   Procedure Laterality Date    BIOPSY OF SKIN LESION  2011    CATARACT IOL, RT/LT Right 06/05/2018    CATARACT IOL, RT/LT Left 07/17/2018    ENT SURGERY      LASER DIODE STAPEDECTOMY Right 1/17/2018    Procedure: LASER DIODE STAPEDECTOMY;  Right Diode Laser Stapedectomy;  Surgeon: Anne Fang MD;  [facility]    PHACOEMULSIFICATION CLEAR CORNEA WITH TORIC INTRAOCULAR LENS IMPLANT Right 6/5/2018    Procedure: PHACOEMULSIFICATION CLEAR CORNEA WITH TORIC INTRAOCULAR LENS IMPLANT;  RIGHT EYE PHACOEMULSIFICATION CLEAR CORNEA WITH TORIC INTRAOCULAR LENS IMPLANT ;  Surgeon: Yuriy Vincent MD;  Location:  [facility]    retinal detachment surgery  July 4, 2008    RETINAL REATTACHMENT Right 7-4-2008    THORACOSCOPIC BIOPSY LUNG  2001       Past Medical History:   Past Medical History:   Diagnosis Date    CNS vasculitis (H)     Dysphonia 2016    Difficulty with my voice (persistent throat clearing, cough,    Hearing problem     Hoarseness 2016    difficulty singing, tiredness has been going on for a year+.    Numbness     heel of foot    PONV (postoperative nausea and vomiting)     with general anesthesia    Tinnitus 5 years ago?    Weakness        Social History:   Social History     Tobacco Use    Smoking status: Never    Smokeless tobacco: Never   Substance Use Topics    Alcohol use: Yes     Comment: 2 glasses of wine or beer 2 times per week       Family History:   Family History   Problem Relation Age of Onset    Breast Cancer Mother 30        unilateral mastectomy; reoccurred at age 60 lumpectomy    Depression  Mother     Substance Abuse Mother         Prescription opiates    Neurologic Disorder Father         MS    Retinal detachment Father     Colon Cancer Father     Neurologic Disorder Sister         MS    Macular Degeneration Maternal Grandfather     Colon Cancer Paternal Aunt     Breast Cancer Paternal Aunt     Glaucoma No family hx of        Allergies:   Allergies   Allergen Reactions    Minocycline Other (See Comments)     Vasculitis in the brain.    Tetracycline Other (See Comments)     Lesions on the brain, right sided paralysis        Active Medications:   Current Outpatient Medications   Medication Sig Dispense Refill    calcium carbonate-vitamin D (OSCAL W/D) 500-200 MG-UNIT tablet       meclizine (ANTIVERT) 12.5 MG tablet Take 2 tablets (25 mg) by mouth 3 times daily as needed for dizziness 6 tablet 0    ondansetron (ZOFRAN) 4 MG tablet Take 1 tablet (4 mg) by mouth every 8 hours as needed for nausea 6 tablet 0       Systemic Review:   CONSTITUTIONAL: NEGATIVE for fever, chills, change in weight  ENT/MOUTH: NEGATIVE for ear, mouth and throat problems  RESP: NEGATIVE for significant cough or SOB  CV: NEGATIVE for chest pain, palpitations or peripheral edema    Physical Examination:   Vital Signs: There were no vitals taken for this visit.  GENERAL: healthy, alert and no distress  NECK: no adenopathy, no asymmetry, masses, or scars  RESP: lungs clear to auscultation - no rales, rhonchi or wheezes  CV: regular rate and rhythm, normal S1 S2, no S3 or S4, no murmur, click or rub, no peripheral edema and peripheral pulses strong  ABDOMEN: soft, nontender, no hepatosplenomegaly, no masses and bowel sounds normal  MS: no gross musculoskeletal defects noted, no edema    I examined patient s dentition and informed the patient that dental injuries are a risk of any anesthetic management. No concerns were noted with this patient's dentition. . The patient has consented to proceed with the procedure.    Plan: Appropriate  to proceed as scheduled.      Elizabeth Saleh MD  10/4/2024    PCP:  Service, [facility]

## 2024-10-04 ENCOUNTER — ANESTHESIA (OUTPATIENT)
Dept: SURGERY | Facility: AMBULATORY SURGERY CENTER | Age: 59
End: 2024-10-04
Payer: COMMERCIAL

## 2024-10-04 ENCOUNTER — HOSPITAL ENCOUNTER (OUTPATIENT)
Facility: AMBULATORY SURGERY CENTER | Age: 59
Discharge: HOME | End: 2024-10-04
Attending: STUDENT IN AN ORGANIZED HEALTH CARE EDUCATION/TRAINING PROGRAM | Admitting: SURGERY
Payer: COMMERCIAL

## 2024-10-04 VITALS
TEMPERATURE: 98 F | SYSTOLIC BLOOD PRESSURE: 111 MMHG | HEIGHT: 68 IN | DIASTOLIC BLOOD PRESSURE: 96 MMHG | WEIGHT: 155 LBS | HEART RATE: 87 BPM | RESPIRATION RATE: 16 BRPM | OXYGEN SATURATION: 98 % | BODY MASS INDEX: 23.49 KG/M2

## 2024-10-04 VITALS — HEART RATE: 87 BPM

## 2024-10-04 LAB — COLONOSCOPY: NORMAL

## 2024-10-04 PROCEDURE — 45385 COLONOSCOPY W/LESION REMOVAL: CPT | Performed by: ANESTHESIOLOGY

## 2024-10-04 PROCEDURE — 45385 COLONOSCOPY W/LESION REMOVAL: CPT | Mod: 33 | Performed by: STUDENT IN AN ORGANIZED HEALTH CARE EDUCATION/TRAINING PROGRAM

## 2024-10-04 PROCEDURE — 88305 TISSUE EXAM BY PATHOLOGIST: CPT | Mod: 26 | Performed by: STUDENT IN AN ORGANIZED HEALTH CARE EDUCATION/TRAINING PROGRAM

## 2024-10-04 PROCEDURE — 45385 COLONOSCOPY W/LESION REMOVAL: CPT | Performed by: NURSE ANESTHETIST, CERTIFIED REGISTERED

## 2024-10-04 PROCEDURE — 88305 TISSUE EXAM BY PATHOLOGIST: CPT | Mod: TC | Performed by: STUDENT IN AN ORGANIZED HEALTH CARE EDUCATION/TRAINING PROGRAM

## 2024-10-04 RX ORDER — SODIUM CHLORIDE, SODIUM LACTATE, POTASSIUM CHLORIDE, CALCIUM CHLORIDE 600; 310; 30; 20 MG/100ML; MG/100ML; MG/100ML; MG/100ML
INJECTION, SOLUTION INTRAVENOUS CONTINUOUS
Status: DISCONTINUED | OUTPATIENT
Start: 2024-10-04 | End: 2024-10-05 | Stop reason: HOSPADM

## 2024-10-04 RX ORDER — ONDANSETRON 2 MG/ML
4 INJECTION INTRAMUSCULAR; INTRAVENOUS
Status: DISCONTINUED | OUTPATIENT
Start: 2024-10-04 | End: 2024-10-05 | Stop reason: HOSPADM

## 2024-10-04 RX ORDER — NALOXONE HYDROCHLORIDE 0.4 MG/ML
0.2 INJECTION, SOLUTION INTRAMUSCULAR; INTRAVENOUS; SUBCUTANEOUS
Status: CANCELLED | OUTPATIENT
Start: 2024-10-04

## 2024-10-04 RX ORDER — NALOXONE HYDROCHLORIDE 0.4 MG/ML
0.4 INJECTION, SOLUTION INTRAMUSCULAR; INTRAVENOUS; SUBCUTANEOUS
Status: CANCELLED | OUTPATIENT
Start: 2024-10-04

## 2024-10-04 RX ORDER — ONDANSETRON 2 MG/ML
4 INJECTION INTRAMUSCULAR; INTRAVENOUS EVERY 6 HOURS PRN
Status: CANCELLED | OUTPATIENT
Start: 2024-10-04

## 2024-10-04 RX ORDER — PROCHLORPERAZINE MALEATE 10 MG
10 TABLET ORAL EVERY 6 HOURS PRN
Status: CANCELLED | OUTPATIENT
Start: 2024-10-04

## 2024-10-04 RX ORDER — LIDOCAINE 40 MG/G
CREAM TOPICAL
Status: DISCONTINUED | OUTPATIENT
Start: 2024-10-04 | End: 2024-10-05 | Stop reason: HOSPADM

## 2024-10-04 RX ORDER — PROPOFOL 10 MG/ML
INJECTION, EMULSION INTRAVENOUS CONTINUOUS PRN
Status: DISCONTINUED | OUTPATIENT
Start: 2024-10-04 | End: 2024-10-04

## 2024-10-04 RX ORDER — LIDOCAINE HYDROCHLORIDE 20 MG/ML
INJECTION, SOLUTION INFILTRATION; PERINEURAL PRN
Status: DISCONTINUED | OUTPATIENT
Start: 2024-10-04 | End: 2024-10-04

## 2024-10-04 RX ORDER — PROPOFOL 10 MG/ML
INJECTION, EMULSION INTRAVENOUS PRN
Status: DISCONTINUED | OUTPATIENT
Start: 2024-10-04 | End: 2024-10-04

## 2024-10-04 RX ORDER — LORATADINE 10 MG/1
10 TABLET ORAL DAILY
COMMUNITY

## 2024-10-04 RX ORDER — FLUMAZENIL 0.1 MG/ML
0.2 INJECTION, SOLUTION INTRAVENOUS
Status: CANCELLED | OUTPATIENT
Start: 2024-10-04 | End: 2024-10-04

## 2024-10-04 RX ORDER — ONDANSETRON 4 MG/1
4 TABLET, ORALLY DISINTEGRATING ORAL EVERY 6 HOURS PRN
Status: CANCELLED | OUTPATIENT
Start: 2024-10-04

## 2024-10-04 RX ADMIN — PROPOFOL 150 MCG/KG/MIN: 10 INJECTION, EMULSION INTRAVENOUS at 09:50

## 2024-10-04 RX ADMIN — PROPOFOL 50 MG: 10 INJECTION, EMULSION INTRAVENOUS at 09:50

## 2024-10-04 RX ADMIN — LIDOCAINE HYDROCHLORIDE 80 MG: 20 INJECTION, SOLUTION INFILTRATION; PERINEURAL at 09:49

## 2024-10-04 NOTE — ANESTHESIA CARE TRANSFER NOTE
Patient: Anna Carrington    Procedure: Procedure(s):  COLONOSCOPY, WITH POLYPECTOMY AND CLIP       Diagnosis: Family history of colon cancer requiring screening colonoscopy [Z80.0]  Diagnosis Additional Information: No value filed.    Anesthesia Type:   MAC     Note:    Oropharynx: oropharynx clear of all foreign objects and spontaneously breathing  Level of Consciousness: drowsy  Oxygen Supplementation: room air    Independent Airway: airway patency satisfactory and stable  Dentition: dentition unchanged  Vital Signs Stable: post-procedure vital signs reviewed and stable  Report to RN Given: handoff report given  Patient transferred to: Phase II    Handoff Report: Identifed the Patient, Identified the Reponsible Provider, Reviewed the pertinent medical history, Discussed the surgical course, Reviewed Intra-OP anesthesia mangement and issues during anesthesia, Set expectations for post-procedure period and Allowed opportunity for questions and acknowledgement of understanding      Vitals:  Vitals Value Taken Time   BP     Temp 36.3  C (97.3  F) 10/04/24 1027   Pulse 86 10/04/24 1027   Resp 16 10/04/24 1027   SpO2 94 % 10/04/24 1027       Electronically Signed By: AMARILYS Whittington CRNA  October 4, 2024  10:27 AM

## 2024-10-04 NOTE — ANESTHESIA PREPROCEDURE EVALUATION
Anesthesia Pre-Procedure Evaluation    Patient: Anna Carrington   MRN: [id number] : 1965        Procedure : Procedure(s):  Colonoscopy          Past Medical History:   Diagnosis Date    CNS vasculitis (H)     Dysphonia     Difficulty with my voice (persistent throat clearing, cough,    Hearing problem     Hoarseness 2016    difficulty singing, tiredness has been going on for a year+.    Numbness     heel of foot    PONV (postoperative nausea and vomiting)     with general anesthesia    Tinnitus 5 years ago?    Weakness       Past Surgical History:   Procedure Laterality Date    BIOPSY OF SKIN LESION      CATARACT IOL, RT/LT Right 2018    CATARACT IOL, RT/LT Left 2018    ENT SURGERY      LASER DIODE STAPEDECTOMY Right 2018    Procedure: LASER DIODE STAPEDECTOMY;  Right Diode Laser Stapedectomy;  Surgeon: Anne Fang MD;  [facility]    PHACOEMULSIFICATION CLEAR CORNEA WITH TORIC INTRAOCULAR LENS IMPLANT Right 2018    Procedure: PHACOEMULSIFICATION CLEAR CORNEA WITH TORIC INTRAOCULAR LENS IMPLANT;  RIGHT EYE PHACOEMULSIFICATION CLEAR CORNEA WITH TORIC INTRAOCULAR LENS IMPLANT ;  Surgeon: Yuriy Vincent MD;  Location: [facility]    retinal detachment surgery  2008    RETINAL REATTACHMENT Right 2008    THORACOSCOPIC BIOPSY LUNG        Allergies   Allergen Reactions    Minocycline Other (See Comments)     Vasculitis in the brain.    Tetracycline Other (See Comments)     Lesions on the brain, right sided paralysis       Social History     Tobacco Use    Smoking status: Never    Smokeless tobacco: Never   Substance Use Topics    Alcohol use: Yes     Comment: 2 glasses of wine or beer 2 times per week      Wt Readings from Last 1 Encounters:   10/04/24 70.3 kg (155 lb)        Anesthesia Evaluation        History of anesthetic complications  - PONV.      ROS/MED HX  ENT/Pulmonary:       Neurologic:       Cardiovascular:       METS/Exercise Tolerance:    "  Hematologic:       Musculoskeletal:       GI/Hepatic:       Renal/Genitourinary:       Endo:       Psychiatric/Substance Use:       Infectious Disease:       Malignancy:       Other:            Physical Exam    Airway        Mallampati: II       Respiratory Devices and Support         Dental       (+) Minor Abnormalities - some fillings, tiny chips      Cardiovascular          Rhythm and rate: regular     Pulmonary                   OUTSIDE LABS:  CBC:   Lab Results   Component Value Date    WBC 5.3 05/22/2019    HGB 13.1 05/22/2019    HCT 41.2 05/22/2019     05/22/2019     BMP:   Lab Results   Component Value Date     05/22/2019    POTASSIUM 3.8 05/22/2019    CHLORIDE 108 05/22/2019    CO2 24 05/22/2019    BUN 20 05/22/2019    CR 0.78 05/22/2019     (H) 05/22/2019     COAGS: No results found for: \"PTT\", \"INR\", \"FIBR\"  POC: No results found for: \"BGM\", \"HCG\", \"HCGS\"  HEPATIC:   Lab Results   Component Value Date    ALBUMIN 4.0 05/22/2019    PROTTOTAL 7.7 05/22/2019    ALT 25 05/22/2019    AST 22 05/22/2019    ALKPHOS 64 05/22/2019    BILITOTAL 0.9 05/22/2019     OTHER:   Lab Results   Component Value Date    ALEJANDRO 9.1 05/22/2019    SED 10 08/15/2012       Anesthesia Plan    ASA Status:  2    NPO Status:  NPO Appropriate    Anesthesia Type: MAC.     - Reason for MAC: immobility needed              Consents    Anesthesia Plan(s) and associated risks, benefits, and realistic alternatives discussed. Questions answered and patient/representative(s) expressed understanding.     - Discussed:     - Discussed with:  Patient            Postoperative Care            Comments:               Thompson Chavez MD    I have reviewed the pertinent notes and labs in the chart from the past 30 days and (re)examined the patient.  Any updates or changes from those notes are reflected in this note.                                "

## 2024-10-04 NOTE — ANESTHESIA POSTPROCEDURE EVALUATION
Patient: Anna Carrington    Procedure: Procedure(s):  COLONOSCOPY, WITH POLYPECTOMY AND CLIP       Anesthesia Type:  MAC    Note:  Disposition: Outpatient   Postop Pain Control: Uneventful            Sign Out: Well controlled pain   PONV: No   Neuro/Psych: Uneventful            Sign Out: Acceptable/Baseline neuro status   Airway/Respiratory: Uneventful            Sign Out: Acceptable/Baseline resp. status   CV/Hemodynamics: Uneventful            Sign Out: Acceptable CV status; No obvious hypovolemia; No obvious fluid overload   Other NRE: NONE   DID A NON-ROUTINE EVENT OCCUR?            Last vitals:  Vitals Value Taken Time   /96 10/04/24 1042   Temp 36.7  C (98  F) 10/04/24 1042   Pulse 87 10/04/24 1042   Resp 16 10/04/24 1042   SpO2 98 % 10/04/24 1042       Electronically Signed By: Thompson Chavez MD  October 4, 2024  1:42 PM

## 2024-10-08 LAB
PATH REPORT.COMMENTS IMP SPEC: NORMAL
PATH REPORT.COMMENTS IMP SPEC: NORMAL
PATH REPORT.FINAL DX SPEC: NORMAL
PATH REPORT.GROSS SPEC: NORMAL
PATH REPORT.INTRAOP OBS SPEC DOC: NORMAL
PATH REPORT.MICROSCOPIC SPEC OTHER STN: NORMAL
PATH REPORT.RELEVANT HX SPEC: NORMAL
PHOTO IMAGE: NORMAL

## 2025-03-12 ENCOUNTER — TRANSCRIBE ORDERS (OUTPATIENT)
Dept: OTHER | Age: 60
End: 2025-03-12

## 2025-03-12 DIAGNOSIS — M18.9 CMC ARTHRITIS, THUMB, DEGENERATIVE: Primary | ICD-10-CM

## 2025-03-12 DIAGNOSIS — M18.11 PRIMARY OSTEOARTHRITIS OF FIRST CARPOMETACARPAL JOINT OF RIGHT HAND: ICD-10-CM

## 2025-04-08 ENCOUNTER — OFFICE VISIT (OUTPATIENT)
Dept: URGENT CARE | Facility: URGENT CARE | Age: 60
End: 2025-04-08
Payer: COMMERCIAL

## 2025-04-08 VITALS
BODY MASS INDEX: 25.54 KG/M2 | HEART RATE: 103 BPM | WEIGHT: 168 LBS | DIASTOLIC BLOOD PRESSURE: 89 MMHG | TEMPERATURE: 97.7 F | SYSTOLIC BLOOD PRESSURE: 125 MMHG | OXYGEN SATURATION: 97 % | RESPIRATION RATE: 16 BRPM

## 2025-04-08 DIAGNOSIS — N30.00 ACUTE CYSTITIS WITHOUT HEMATURIA: ICD-10-CM

## 2025-04-08 DIAGNOSIS — R30.0 DYSURIA: Primary | ICD-10-CM

## 2025-04-08 LAB
ALBUMIN UR-MCNC: NEGATIVE MG/DL
APPEARANCE UR: CLEAR
BACTERIA #/AREA URNS HPF: ABNORMAL /HPF
BILIRUB UR QL STRIP: NEGATIVE
COLOR UR AUTO: YELLOW
GLUCOSE UR STRIP-MCNC: NEGATIVE MG/DL
HGB UR QL STRIP: ABNORMAL
KETONES UR STRIP-MCNC: NEGATIVE MG/DL
LEUKOCYTE ESTERASE UR QL STRIP: ABNORMAL
NITRATE UR QL: POSITIVE
PH UR STRIP: 6 [PH] (ref 5–7)
RBC #/AREA URNS AUTO: ABNORMAL /HPF
SP GR UR STRIP: 1.02 (ref 1–1.03)
UROBILINOGEN UR STRIP-ACNC: 0.2 E.U./DL
WBC #/AREA URNS AUTO: ABNORMAL /HPF

## 2025-04-08 PROCEDURE — 3079F DIAST BP 80-89 MM HG: CPT | Performed by: FAMILY MEDICINE

## 2025-04-08 PROCEDURE — 3074F SYST BP LT 130 MM HG: CPT | Performed by: FAMILY MEDICINE

## 2025-04-08 PROCEDURE — 81001 URINALYSIS AUTO W/SCOPE: CPT | Performed by: FAMILY MEDICINE

## 2025-04-08 PROCEDURE — 99213 OFFICE O/P EST LOW 20 MIN: CPT | Performed by: FAMILY MEDICINE

## 2025-04-08 PROCEDURE — 87086 URINE CULTURE/COLONY COUNT: CPT | Performed by: FAMILY MEDICINE

## 2025-04-08 PROCEDURE — 87186 SC STD MICRODIL/AGAR DIL: CPT | Performed by: FAMILY MEDICINE

## 2025-04-08 RX ORDER — NITROFURANTOIN 25; 75 MG/1; MG/1
100 CAPSULE ORAL 2 TIMES DAILY
Qty: 10 CAPSULE | Refills: 0 | Status: SHIPPED | OUTPATIENT
Start: 2025-04-08 | End: 2025-04-13

## 2025-04-08 NOTE — PATIENT INSTRUCTIONS
You are seen today for urinary tract infection   Please finish the antibiotic even if you are feeling better.  Do take more fluids, you  can take pyridium for pain   Watch for worsening signs of infection such as increasing fever above 102, worsening abdominal pain, worsening fatigue ,worsening flank pain .     Cony Vyas MD

## 2025-04-08 NOTE — PROGRESS NOTES
Chief Complaint   Patient presents with    Urgent Care     Possible UTI, urinary urgency, some burning when peeing,  symptoms started today. No other symptoms reported      Anna was seen today for urgent care.    Diagnoses and all orders for this visit:    Dysuria  -     UA Macroscopic with reflex to Microscopic and Culture - Clinic Collect  -     Urine Microscopic Exam  -     Urine Culture    Acute cystitis without hematuria  -     nitroFURantoin macrocrystal-monohydrate (MACROBID) 100 MG capsule; Take 1 capsule (100 mg) by mouth 2 times daily for 5 days.      ASSESSMENT: UTI uncomplicated without evidence of pyelonephritis    PLAN: Treatment per orders - also push fluids, may use Pyridium OTC prn. Call or return to clinic prn if these symptoms worsen or fail to improve as anticipated.      Cony Vyas MD         SUBJECTIVE: Anna Carrington is a 59 year old female who complains of urinary frequency, urgency and dysuria x 1 days, without flank pain, fever, chills, or abnormal vaginal discharge or bleeding.     OBJECTIVE: Appears well, in no apparent distress.  Vital signs are normal. The abdomen is soft without tenderness, guarding, mass, rebound or organomegaly. No CVA tenderness or inguinal adenopathy noted. Urine   Results for orders placed or performed in visit on 04/08/25   UA Macroscopic with reflex to Microscopic and Culture - Clinic Collect     Status: Abnormal    Specimen: Urine, Clean Catch   Result Value Ref Range    Color Urine Yellow Colorless, Straw, Light Yellow, Yellow    Appearance Urine Clear Clear    Glucose Urine Negative Negative mg/dL    Bilirubin Urine Negative Negative    Ketones Urine Negative Negative mg/dL    Specific Gravity Urine 1.025 1.003 - 1.035    Blood Urine Trace (A) Negative    pH Urine 6.0 5.0 - 7.0    Protein Albumin Urine Negative Negative mg/dL    Urobilinogen Urine 0.2 0.2, 1.0 E.U./dL    Nitrite Urine Positive (A) Negative    Leukocyte Esterase Urine Small (A)  Negative   Urine Microscopic Exam     Status: Abnormal   Result Value Ref Range    Bacteria Urine Many (A) None Seen /HPF    RBC Urine 0-2 0-2 /HPF /HPF    WBC Urine 25-50 (A) 0-5 /HPF /HPF       Cony Vyas MD

## 2025-04-08 NOTE — CONFIDENTIAL NOTE
Urgent Care Clinic Visit    Chief Complaint   Patient presents with    Urgent Care     Possible UTI, urinary urgency, some burning when peeing,  symptoms started today. No other symptoms reported                4/8/2025     4:25 PM   Additional Questions   Roomed by Dontrell GRIFFITH   Accompanied by Self     Pre-Provider Visit Orders- Urinalysis UA/UC  Patient reports the following symptoms:  discomfort, pain or burning with urination   Does the patient report any of the following symptoms: vaginal discharge, vaginal itching, possible yeast infection, has a urinary catheter in place, or unable to void in a specimen cup?  No

## 2025-04-10 LAB — BACTERIA UR CULT: ABNORMAL

## 2025-04-17 ENCOUNTER — THERAPY VISIT (OUTPATIENT)
Dept: OCCUPATIONAL THERAPY | Facility: CLINIC | Age: 60
End: 2025-04-17
Attending: FAMILY MEDICINE
Payer: COMMERCIAL

## 2025-04-17 DIAGNOSIS — M79.642 BILATERAL HAND PAIN: Primary | ICD-10-CM

## 2025-04-17 DIAGNOSIS — M79.641 BILATERAL HAND PAIN: Primary | ICD-10-CM

## 2025-04-17 DIAGNOSIS — M18.9 CMC ARTHRITIS, THUMB, DEGENERATIVE: ICD-10-CM

## 2025-04-17 NOTE — PROGRESS NOTES
OCCUPATIONAL THERAPY EVALUATION  Type of Visit: Evaluation       Fall Risk Screen:  Have you fallen 2 or more times in the past year?: No  Have you fallen and had an injury in the past year?: Yes  Is patient receiving Physical Therapy Services?: No    Subjective        Presenting condition or subjective complaint: Thumb pain due to arthritis  Date of onset: 03/12/25 (Referral)    Relevant medical history: Arthritis; Hearing problems   Dates & types of surgery: No surgeries within 5 years. Other details in MyChart.    Patient comes to therapy today for evaluation and treatment of bilateral hand pain, right 1st CMC primary OA. Symptoms began approximately 1 year ago with insidious onset, though lately rest alone is on not palliative. 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 She endorses some weakness and discomfort with opening jars and forceful pinching such as handling tweezers.     Prior diagnostic imaging/testing results: X-ray     Prior therapy history for the same diagnosis, illness or injury: No      Prior Level of Function  Transfers: Independent  Ambulation: Independent  ADL: Independent  IADL: Driving, Finances, Housekeeping, Laundry, Meal preparation, Medication management, Work, Yard work    Living Environment  Social support: With a significant other or spouse   Type of home: House   Stairs to enter the home: Yes 2 Is there a railing: Yes     Ramp: Yes   Stairs inside the home: Yes 24 Is there a railing: Yes     Help at home: None  Equipment owned:       Employment: Yes Administrative Support  Hobbies/Interests: Playing piano, reading, walking, cooking    Patient goals for therapy:  things and play the piano more comfortably.       Objective   ADDITIONAL HISTORY:  Right hand dominant  Patient reports symptoms of pain, stiffness/loss of motion, and weakness/loss of strength  Transportation: drives  Currently working in normal job without  restrictions    Functional Outcome Measure:   Upper Extremity Functional Index Score:  SCORE:   Column Totals: /80: (Patient-Rptd) 75   (A lower score indicates greater disability.)    PAIN:  Pain Level at Rest: 0/10  Pain Level with Use: 3/10  Pain Location: Basilar thumb, thenar   Pain Quality: Aching  Pain Frequency: intermittent  Pain is Worst: daytime  Pain is Exacerbated By: Forming octaves while playing piano, forceful gripping/twisting   Pain is Relieved By: rest  Pain Progression: Unchanged    POSTURE: Normal     EDEMA: None     SENSATION: WNL throughout all nerve distributions; per patient report     ROM:   Thumb ROM  Left AROM Right AROM    MP Joint     IP Joint      Radial Abduction 47 40   Palmar Abduction     Opposition     Retropulsion (cm)     Kapandji Opposition Scale (0-10/10) 10/10 10/10       OBSERVATIONS/APPEARANCE:   Thumb Left Right   Shoulder deformity present at CMC joint Trace  Mild   Edema over CMC joint None None   Noted collapse of MP Joint into hyperextension during pinch        - There is ecchymosis to the thenar of the right hand, non-tender to palaption, very mild at the 1st CMC.     SPECIAL TESTS:   CMC OA Provocative Tests  Pain Report Left Right   Crepitus Present None None   Thumb Adduction Stress Test Negative Trace   C Thumb Extension Stress Test Negative Mild   WHAT Test Negative Mild     Stage of Stenosing Tenosynovitis (SST) Left Right   Thumb  Stage I Stage I   Stage 1:  Normal  Stage 2:  Uneven motion of tendon  Stage 3:  Triggering, clicking, catching  Stage 4:  Locking in extension or flexion; unlocked by active motion  Stage 5:  Locking in extension or flexion; unlocked by passive motion  Stage 6:  Finger locked in extension or flexion    STRENGTH:     Measured in pounds 4/17/2025 4/17/2025    Left Right   Trial 1       Lateral Pinch  Measured in pounds 4/17/2025 4/17/2025    Left Right   Trial 1 18# 16#, +     Assessment & Plan   CLINICAL IMPRESSIONS  Medical  Diagnosis: Primary osteoarthritis of right first CMC joint, degenerative first CMC arthritis    Treatment Diagnosis: Primary osteoarthritis of right first CMC joint, degenerative first CMC arthritis, bilateral hand pain    Impression/Assessment: Pt is a 59 year old female presenting to Occupational Therapy due to bilateral hand pain, primary osteoarthritis of right first CMC joint.  The following significant findings have been identified: Impaired activity tolerance, Impaired coordination, Impaired ROM, Impaired strength, and Pain.  These identified deficits interfere with their ability to perform self care tasks, work tasks, recreational activities, driving , medication management, financial management,  yard work, care of others, and meal planning and preparation as compared to previous level of function.   Patient's limitations or Problem List includes: Pain, Decreased ROM/motion, Weakness, Decreased stability, Hypermobility, Hypomobility, Decreased , Decreased pinch, Decreased coordination, Decreased dexterity, and Tightness in musculature of the bilateral wrist, hand, and thumb which interferes with the patient's ability to perform Self Care Tasks (dressing, eating, bathing, hygiene/toileting), Work Tasks, Sleep Patterns, Recreational Activities, Household Chores, and Driving  as compared to previous level of function.    Clinical Decision Making (Complexity):  Assessment of Occupational Performance: 1-3 Performance Deficits  Occupational Performance Limitations: bathing/showering, toileting, dressing, feeding, hygiene and grooming, care of others, communication management, driving and community mobility, health management and maintenance, home establishment and management, meal preparation and cleanup, shopping, sleep, work, leisure activities, and social participation  Clinical Decision Making (Complexity): Low complexity    PLAN OF CARE  Treatment Interventions:  Modalities:  Paraffin  Therapeutic  Exercise:  AROM, AAROM, PROM, Tendon Gliding, Blocking, Reverse Blocking, Place and Hold, Contract Relax, Extensor Tracking, Isotonics, Isometrics, and Stabilization  Neuromuscular re-education:  Nerve Gliding, Coordination/Dexterity, Kinesthetic Training, Proprioceptive Training, Posture, Kinesiotaping, Strain Counter Strain, Isometrics, and Stabilization  Manual Techniques:  Coordination/Dexterity, Joint mobilization, Friction massage, Myofascial release, and Manual edema mobilization  Orthotic Fabrication:  Static, Finger based, Hand based, and Forearm based  Self Care:  Self Care Tasks, Ergonomic Considerations, and Work Tasks    Long Term Goals   OT Goal 1  Goal Identifier: Goal I  Goal Description: Patient will complete required ADL, IADL, work and leisure tasks with mild or less pain and/or difficulty utilizing orthotics, joint protection principles and AE as needed.  (0-1/10)  Rationale: In order to maximize safety and independence with performance of self-care activities;In order to maximize safety and independence with ADL/IADLs  Goal Progress: New  Target Date: 07/10/25      Frequency of Treatment: 1 time per week  Duration of Treatment: 12 weeks     Education Assessment: Learner/Method: Patient;No Barriers to Learning;Pictures/Video;Demonstration;Reading;Listening     Risks and benefits of evaluation/treatment have been explained.   Patient/Family/caregiver agrees with Plan of Care.     Evaluation Time:    OT Eval, Low Complexity Minutes (95979): 19    Signing Clinician: Justin Owusu OT

## 2025-04-23 ENCOUNTER — THERAPY VISIT (OUTPATIENT)
Dept: OCCUPATIONAL THERAPY | Facility: CLINIC | Age: 60
End: 2025-04-23
Attending: FAMILY MEDICINE
Payer: COMMERCIAL

## 2025-04-23 DIAGNOSIS — M18.9 OSTEOARTHRITIS OF CARPOMETACARPAL (CMC) JOINT OF THUMB, UNSPECIFIED LATERALITY, UNSPECIFIED OSTEOARTHRITIS TYPE: ICD-10-CM

## 2025-04-23 DIAGNOSIS — M79.641 BILATERAL HAND PAIN: Primary | ICD-10-CM

## 2025-04-23 DIAGNOSIS — M79.642 BILATERAL HAND PAIN: Primary | ICD-10-CM

## 2025-04-23 PROCEDURE — 97140 MANUAL THERAPY 1/> REGIONS: CPT | Mod: GO | Performed by: OCCUPATIONAL THERAPIST

## 2025-04-23 PROCEDURE — 97110 THERAPEUTIC EXERCISES: CPT | Mod: GO | Performed by: OCCUPATIONAL THERAPIST

## 2025-05-15 ENCOUNTER — ANCILLARY PROCEDURE (OUTPATIENT)
Dept: MAMMOGRAPHY | Facility: CLINIC | Age: 60
End: 2025-05-15
Payer: COMMERCIAL

## 2025-05-15 DIAGNOSIS — Z12.31 VISIT FOR SCREENING MAMMOGRAM: ICD-10-CM

## 2025-05-15 PROCEDURE — 77063 BREAST TOMOSYNTHESIS BI: CPT | Performed by: STUDENT IN AN ORGANIZED HEALTH CARE EDUCATION/TRAINING PROGRAM

## 2025-05-15 PROCEDURE — 77067 SCR MAMMO BI INCL CAD: CPT | Performed by: STUDENT IN AN ORGANIZED HEALTH CARE EDUCATION/TRAINING PROGRAM

## 2025-05-28 ENCOUNTER — THERAPY VISIT (OUTPATIENT)
Dept: OCCUPATIONAL THERAPY | Facility: CLINIC | Age: 60
End: 2025-05-28
Payer: COMMERCIAL

## 2025-05-28 DIAGNOSIS — M79.642 BILATERAL HAND PAIN: Primary | ICD-10-CM

## 2025-05-28 DIAGNOSIS — M79.641 BILATERAL HAND PAIN: Primary | ICD-10-CM

## 2025-05-28 DIAGNOSIS — M18.9 OSTEOARTHRITIS OF CARPOMETACARPAL (CMC) JOINT OF THUMB, UNSPECIFIED LATERALITY, UNSPECIFIED OSTEOARTHRITIS TYPE: ICD-10-CM

## 2025-05-28 PROCEDURE — 97760 ORTHOTIC MGMT&TRAING 1ST ENC: CPT | Mod: GO | Performed by: OCCUPATIONAL THERAPIST

## 2025-05-28 PROCEDURE — 97110 THERAPEUTIC EXERCISES: CPT | Mod: GO | Performed by: OCCUPATIONAL THERAPIST

## 2025-06-21 ENCOUNTER — HEALTH MAINTENANCE LETTER (OUTPATIENT)
Age: 60
End: 2025-06-21

## 2025-08-12 DIAGNOSIS — H33.8 OLD RETINAL DETACHMENT, PARTIAL: Primary | ICD-10-CM

## (undated) DEVICE — GLOVE PROTEXIS MICRO 8.5  2D73PM85

## (undated) DEVICE — EYE SHIELD PLASTIC

## (undated) DEVICE — SUCTION MANIFOLD NEPTUNE 2 SYS 4 PORT 0702-020-000

## (undated) DEVICE — TAPE MICROPORE 2"X1.5YD 1530S-2

## (undated) DEVICE — DRSG STERI STRIP 1/2X4" R1547

## (undated) DEVICE — GLOVE PROTEXIS MICRO 6.5  2D73PM65

## (undated) DEVICE — ESU GROUND PAD ADULT W/CORD E7507

## (undated) DEVICE — GLOVE PROTEXIS MICRO 6.0  2D73PM60

## (undated) DEVICE — ENDO SNARE EXACTO COLD 9MM LOOP 2.4MMX230CM 00711115

## (undated) DEVICE — DRAPE EAR CHRONIC LATEX FREE 3609

## (undated) DEVICE — DRAPE STERI TOWEL SM 1000

## (undated) DEVICE — DRAPE MICROSCOPE LEICA 54X150" AR8033650

## (undated) DEVICE — EYE TIP IRRIGATION & ASPIRATION POLYMER 35D BENT 8065751511

## (undated) DEVICE — NDL 27GA 1.25" 305136

## (undated) DEVICE — TONGUE DEPRESSOR STERILE 25-705-ALL

## (undated) DEVICE — PREP SKIN SCRUB TRAY 4461A

## (undated) DEVICE — TUBING SUCTION MEDI-VAC 1/4"X20' N620A

## (undated) DEVICE — SU CHROMIC 4-0 RB-1 27" U203H

## (undated) DEVICE — EYE CANN IRR 25GA HYDRODISSECTING 585037

## (undated) DEVICE — PREP PAD ALCOHOL 6818

## (undated) DEVICE — PACK CATARACT CUSTOM SO DALE SEY32CTFCX

## (undated) DEVICE — EYE KNIFE SLIT XSTAR VISITEC 2.4MM 45DEG BEVEL UP 373724

## (undated) DEVICE — NDL 18GA 1.5" 305196

## (undated) DEVICE — DRSG TEGADERM 2 3/8X2 3/4" 1624W

## (undated) DEVICE — SPONGE SURGIFOAM 100 1974

## (undated) DEVICE — GLOVE LINER FINGER 1/2

## (undated) DEVICE — ADH LIQUID MASTISOL TOPICAL VIAL 2-3ML 0523-48

## (undated) DEVICE — PACK ENT MINOR CUSTOM ASC

## (undated) DEVICE — GLOVE GAMMEX NEOPRENE ULTRA SZ 7 LF 8514

## (undated) DEVICE — PROBE OTO SHORT ANGLED 14320-1

## (undated) DEVICE — TAPE MICROPORE 1"X1.5YD 1530S-1

## (undated) DEVICE — DRSG BANDAID 1X3" FABRIC CURITY LATEX FREE KC44101

## (undated) DEVICE — GLOVE PROTEXIS MICRO 7.0  2D73PM70

## (undated) DEVICE — SYR 05ML SLIP TIP W/O NDL

## (undated) DEVICE — LINEN TOWEL PACK X5 5464

## (undated) DEVICE — EYE PACK BVI READYPAK KIT #1

## (undated) DEVICE — EYE SOL BSS 500ML

## (undated) DEVICE — SOL NACL 0.9% IRRIG 1000ML BOTTLE 2F7124

## (undated) DEVICE — BLADE KNIFE BEAVER MINI BEAVER6400

## (undated) DEVICE — NDL ANGIOCATH 14GA 1.25" 4048

## (undated) DEVICE — DRAPE WARMER 66X44" ORS-300

## (undated) DEVICE — BLADE KNIFE BEAVER TYMPANOPLASTY 2.5MM W/60D DOWN 377200

## (undated) DEVICE — Device

## (undated) DEVICE — NIM ELEC SUBDERMAL NDL 3PAIR/BOX

## (undated) DEVICE — WIPE INSTRUMENT MEROCEL 400200

## (undated) DEVICE — SU ETHILON 10-0 CS160-6 12" 9000G

## (undated) DEVICE — GOWN LG DISP 9515

## (undated) DEVICE — LINEN GOWN XLG 5407

## (undated) DEVICE — SOL WATER IRRIG 500ML BOTTLE 2F7113

## (undated) DEVICE — PREP POVIDONE IODINE SOLUTION 10% 120ML

## (undated) DEVICE — ESU ELEC BLADE 2.75" COATED/INSULATED E1455

## (undated) DEVICE — SYR 10ML LL W/O NDL

## (undated) DEVICE — KIT ENDO TURNOVER/PROCEDURE CARRY-ON 101822

## (undated) DEVICE — DRSG COTTON BALL 6PK LCB62

## (undated) DEVICE — DRAPE STOCKINETTE IMPERVIOUS 10" 21048

## (undated) DEVICE — SYR 01ML TBC SLIP TIP W/O NDL

## (undated) DEVICE — EYE SOL BSS 15ML BOTTLE 65079515

## (undated) DEVICE — GOWN IMPERVIOUS 2XL BLUE

## (undated) DEVICE — ENDO TRAP POLYP E-TRAP 00711099

## (undated) DEVICE — EYE PACK CUSTOM ANTERIOR 30DEG TIP CENTURION PPK6682-04

## (undated) DEVICE — SUCTION MANIFOLD NEPTUNE 2 SYS 1 PORT 702-025-000

## (undated) DEVICE — SPECIMEN CONTAINER 3OZ W/FORMALIN 59901

## (undated) DEVICE — SOL WATER IRRIG 1000ML BOTTLE 2F7114

## (undated) RX ORDER — EPHEDRINE SULFATE 50 MG/ML
INJECTION, SOLUTION INTRAMUSCULAR; INTRAVENOUS; SUBCUTANEOUS
Status: DISPENSED
Start: 2018-01-17

## (undated) RX ORDER — GLYCOPYRROLATE 0.2 MG/ML
INJECTION INTRAMUSCULAR; INTRAVENOUS
Status: DISPENSED
Start: 2018-01-17

## (undated) RX ORDER — IBUPROFEN 600 MG/1
TABLET, FILM COATED ORAL
Status: DISPENSED
Start: 2018-06-05

## (undated) RX ORDER — DEXAMETHASONE SODIUM PHOSPHATE 10 MG/ML
INJECTION, SOLUTION INTRAMUSCULAR; INTRAVENOUS
Status: DISPENSED
Start: 2018-01-17

## (undated) RX ORDER — LIDOCAINE HYDROCHLORIDE 20 MG/ML
INJECTION, SOLUTION EPIDURAL; INFILTRATION; INTRACAUDAL; PERINEURAL
Status: DISPENSED
Start: 2018-01-17

## (undated) RX ORDER — LIDOCAINE HYDROCHLORIDE 10 MG/ML
INJECTION, SOLUTION EPIDURAL; INFILTRATION; INTRACAUDAL; PERINEURAL
Status: DISPENSED
Start: 2018-06-05

## (undated) RX ORDER — PROPOFOL 10 MG/ML
INJECTION, EMULSION INTRAVENOUS
Status: DISPENSED
Start: 2018-01-17

## (undated) RX ORDER — LIDOCAINE HYDROCHLORIDE 10 MG/ML
INJECTION, SOLUTION EPIDURAL; INFILTRATION; INTRACAUDAL; PERINEURAL
Status: DISPENSED
Start: 2018-01-17

## (undated) RX ORDER — GABAPENTIN 300 MG/1
CAPSULE ORAL
Status: DISPENSED
Start: 2018-01-17

## (undated) RX ORDER — HYDROMORPHONE HYDROCHLORIDE 1 MG/ML
INJECTION, SOLUTION INTRAMUSCULAR; INTRAVENOUS; SUBCUTANEOUS
Status: DISPENSED
Start: 2018-01-17

## (undated) RX ORDER — EPINEPHRINE 1 MG/ML
INJECTION, SOLUTION, CONCENTRATE INTRAVENOUS
Status: DISPENSED
Start: 2018-01-17

## (undated) RX ORDER — ONDANSETRON 2 MG/ML
INJECTION INTRAMUSCULAR; INTRAVENOUS
Status: DISPENSED
Start: 2018-01-17

## (undated) RX ORDER — OFLOXACIN 3 MG/ML
SOLUTION/ DROPS OPHTHALMIC
Status: DISPENSED
Start: 2018-01-17

## (undated) RX ORDER — HYDROCODONE BITARTRATE AND ACETAMINOPHEN 5; 325 MG/1; MG/1
TABLET ORAL
Status: DISPENSED
Start: 2018-01-17

## (undated) RX ORDER — FENTANYL CITRATE 50 UG/ML
INJECTION, SOLUTION INTRAMUSCULAR; INTRAVENOUS
Status: DISPENSED
Start: 2018-01-17

## (undated) RX ORDER — ACETAMINOPHEN 325 MG/1
TABLET ORAL
Status: DISPENSED
Start: 2018-01-17

## (undated) RX ORDER — DEXAMETHASONE SODIUM PHOSPHATE 4 MG/ML
INJECTION, SOLUTION INTRA-ARTICULAR; INTRALESIONAL; INTRAMUSCULAR; INTRAVENOUS; SOFT TISSUE
Status: DISPENSED
Start: 2018-01-17

## (undated) RX ORDER — EPINEPHRINE NASAL SOLUTION 1 MG/ML
SOLUTION NASAL
Status: DISPENSED
Start: 2018-01-17

## (undated) RX ORDER — PHENYLEPHRINE HCL IN 0.9% NACL 1 MG/10 ML
SYRINGE (ML) INTRAVENOUS
Status: DISPENSED
Start: 2018-01-17

## (undated) RX ORDER — REMIFENTANIL HYDROCHLORIDE 1 MG/ML
INJECTION, POWDER, LYOPHILIZED, FOR SOLUTION INTRAVENOUS
Status: DISPENSED
Start: 2018-01-17

## (undated) RX ORDER — BACITRACIN 500 [USP'U]/G
OINTMENT OPHTHALMIC
Status: DISPENSED
Start: 2018-01-17